# Patient Record
Sex: MALE | Race: WHITE | NOT HISPANIC OR LATINO | Employment: FULL TIME | URBAN - NONMETROPOLITAN AREA
[De-identification: names, ages, dates, MRNs, and addresses within clinical notes are randomized per-mention and may not be internally consistent; named-entity substitution may affect disease eponyms.]

---

## 2020-04-02 ENCOUNTER — HOSPITAL ENCOUNTER (EMERGENCY)
Facility: HOSPITAL | Age: 60
Discharge: HOME | End: 2020-04-02
Attending: EMERGENCY MEDICINE | Admitting: EMERGENCY MEDICINE

## 2020-04-02 VITALS
RESPIRATION RATE: 18 BRPM | WEIGHT: 182 LBS | OXYGEN SATURATION: 97 % | HEART RATE: 97 BPM | SYSTOLIC BLOOD PRESSURE: 173 MMHG | HEIGHT: 72 IN | BODY MASS INDEX: 24.65 KG/M2 | DIASTOLIC BLOOD PRESSURE: 103 MMHG | TEMPERATURE: 98.5 F

## 2020-04-02 DIAGNOSIS — M54.50 ACUTE LEFT-SIDED LOW BACK PAIN WITHOUT SCIATICA: Primary | ICD-10-CM

## 2020-04-02 PROCEDURE — 25010000002 MORPHINE PER 10 MG: Performed by: EMERGENCY MEDICINE

## 2020-04-02 PROCEDURE — 96372 THER/PROPH/DIAG INJ SC/IM: CPT

## 2020-04-02 PROCEDURE — 99283 EMERGENCY DEPT VISIT LOW MDM: CPT

## 2020-04-02 RX ORDER — MORPHINE SULFATE 4 MG/ML
4 INJECTION, SOLUTION INTRAMUSCULAR; INTRAVENOUS ONCE
Status: COMPLETED | OUTPATIENT
Start: 2020-04-02 | End: 2020-04-02

## 2020-04-02 RX ORDER — GABAPENTIN 600 MG/1
600 TABLET ORAL 4 TIMES DAILY
COMMUNITY
End: 2020-07-17 | Stop reason: SDUPTHER

## 2020-04-02 RX ORDER — ALBUTEROL SULFATE 90 UG/1
2 AEROSOL, METERED RESPIRATORY (INHALATION) EVERY 4 HOURS PRN
COMMUNITY
End: 2020-11-17 | Stop reason: SDUPTHER

## 2020-04-02 RX ORDER — ROPINIROLE 0.25 MG/1
0.25 TABLET, FILM COATED ORAL 3 TIMES DAILY
COMMUNITY
End: 2020-09-02 | Stop reason: SDUPTHER

## 2020-04-02 RX ORDER — CARVEDILOL 12.5 MG/1
12.5 TABLET ORAL 2 TIMES DAILY WITH MEALS
COMMUNITY
End: 2022-08-08 | Stop reason: SDUPTHER

## 2020-04-02 RX ORDER — TIZANIDINE 4 MG/1
4 TABLET ORAL 3 TIMES DAILY
COMMUNITY
End: 2020-10-26 | Stop reason: SDUPTHER

## 2020-04-02 RX ORDER — PANTOPRAZOLE SODIUM 40 MG/1
40 TABLET, DELAYED RELEASE ORAL DAILY
COMMUNITY
End: 2022-05-02 | Stop reason: SDUPTHER

## 2020-04-02 RX ORDER — CYCLOBENZAPRINE HCL 10 MG
10 TABLET ORAL 3 TIMES DAILY PRN
COMMUNITY
End: 2020-10-26

## 2020-04-02 RX ORDER — ATORVASTATIN CALCIUM 40 MG/1
40 TABLET, FILM COATED ORAL NIGHTLY
COMMUNITY
End: 2021-05-25 | Stop reason: SDUPTHER

## 2020-04-02 RX ORDER — LIDOCAINE 50 MG/G
1 PATCH TOPICAL EVERY 24 HOURS
Qty: 30 PATCH | Refills: 0 | Status: SHIPPED | OUTPATIENT
Start: 2020-04-02 | End: 2020-12-09

## 2020-04-02 RX ORDER — HYDROCODONE BITARTRATE AND ACETAMINOPHEN 5; 325 MG/1; MG/1
1 TABLET ORAL DAILY PRN
COMMUNITY
End: 2020-07-17 | Stop reason: SDUPTHER

## 2020-04-02 RX ADMIN — MORPHINE SULFATE 4 MG: 4 INJECTION, SOLUTION INTRAMUSCULAR; INTRAVENOUS at 15:39

## 2020-04-02 NOTE — ED PROVIDER NOTES
Subjective   This patient states for 1 week he has had left low back pain.  He states it occurred after he spent a day working on some cars he states he may have injured it getting under and out from under cars throughout the day although he does not recall a specific moment.  No radicular symptoms.  No bowel or bladder incontinence or saddle paresthesias.  No rash.  Pain is worse with range of motion and movement in the left low back.  No midline tenderness.  There is no scar noted over the lumbar spine midline from a past surgery in December last year.  He denies midline vertebral tenderness.  No urinary symptoms.  No flank pain or abdominal pain.  He was seen at an [facility] emergency department on Tuesday and given a shot of Toradol and muscle relaxer and sent home with a Medrol Dosepak and Flexeril.  He has hydrocodone listed on his medication list confirmed by his pharmacy that he takes 1 a day of for chronic back pain.  He comes into the ER today for continued pain and no improvement in symptoms.          Review of Systems   Constitutional: Negative.  Negative for fever.   HENT: Negative.    Eyes: Negative.    Respiratory: Negative.    Cardiovascular: Negative.    Gastrointestinal: Negative.  Negative for abdominal pain.   Genitourinary: Negative.  Negative for difficulty urinating, dysuria, flank pain and hematuria.   Musculoskeletal: Negative.    Skin: Negative.  Negative for rash.   Allergic/Immunologic: Negative.    Neurological: Negative.    Psychiatric/Behavioral: Negative.    All other systems reviewed and are negative.      Past Medical History:   Diagnosis Date   • Arthritis    • COPD (chronic obstructive pulmonary disease) (CMS/[facility])    • Hyperlipidemia    • Hypertension    • WPW (Kalyani-Parkinson-White syndrome)        No Known Allergies    Past Surgical History:   Procedure Laterality Date   • BACK SURGERY     • FACIAL RECONSTRUCTION SURGERY         History reviewed. No pertinent family  history.    Social History     Socioeconomic History   • Marital status:      Spouse name: Not on file   • Number of children: Not on file   • Years of education: Not on file   • Highest education level: Not on file   Tobacco Use   • Smoking status: Current Every Day Smoker     Packs/day: 0.50     Types: Cigarettes   Substance and Sexual Activity   • Alcohol use: Never     Frequency: Never   • Drug use: Never   • Sexual activity: Defer           Objective   Physical Exam   Constitutional: He is oriented to person, place, and time. He appears well-developed and well-nourished.   HENT:   Head: Normocephalic and atraumatic.   Right Ear: External ear normal.   Left Ear: External ear normal.   Neck: Normal range of motion.   Cardiovascular: Normal rate.   Pulmonary/Chest: Effort normal.   Abdominal: Soft. There is no tenderness.   Musculoskeletal: Normal range of motion.        Lumbar back: He exhibits tenderness. He exhibits no bony tenderness, no swelling, no edema, no deformity and no laceration.        Back:    Neurological: He is alert and oriented to person, place, and time. GCS eye subscore is 4. GCS verbal subscore is 5. GCS motor subscore is 6.   2+ patellar reflexes bilaterally.  5-5 strength in dorsi and plantar flexion against resistance of bilateral lower extremities.  Has a straight leg raise at the left and 90 degrees   Skin: Skin is warm and dry.   Psychiatric: He has a normal mood and affect. His behavior is normal. Thought content normal.   Nursing note and vitals reviewed.      Procedures           ED Course                                           MDM  No red flags consistent with cauda equina.  Pain is 100% reproducible with straight leg raise on the left, palpation of left low back and movement.  Suspect musculoskeletal pain.  Will give shot of morphine here and prescribe topical pain patches as the patient already has muscle relaxers, Lortab, steroids at home. Discussed with Dr. Allen, he  agrees with workup and plan. Noted mild hypertension, likely due to pain. No abd pain or TTP.  Final diagnoses:   Acute left-sided low back pain without sciatica            Siva Tabares PA-C  04/02/20 1551

## 2020-05-28 ENCOUNTER — OFFICE VISIT (OUTPATIENT)
Dept: FAMILY MEDICINE CLINIC | Facility: CLINIC | Age: 60
End: 2020-05-28

## 2020-05-28 VITALS
DIASTOLIC BLOOD PRESSURE: 70 MMHG | TEMPERATURE: 98.6 F | HEIGHT: 72 IN | HEART RATE: 75 BPM | SYSTOLIC BLOOD PRESSURE: 120 MMHG | WEIGHT: 180 LBS | BODY MASS INDEX: 24.38 KG/M2 | RESPIRATION RATE: 18 BRPM | OXYGEN SATURATION: 96 %

## 2020-05-28 DIAGNOSIS — Z71.6 ENCOUNTER FOR SMOKING CESSATION COUNSELING: ICD-10-CM

## 2020-05-28 DIAGNOSIS — E78.2 MIXED HYPERLIPIDEMIA: ICD-10-CM

## 2020-05-28 DIAGNOSIS — I10 ESSENTIAL HYPERTENSION: ICD-10-CM

## 2020-05-28 DIAGNOSIS — J44.9 CHRONIC OBSTRUCTIVE PULMONARY DISEASE, UNSPECIFIED COPD TYPE (HCC): Primary | ICD-10-CM

## 2020-05-28 DIAGNOSIS — R22.1 NECK FULLNESS: ICD-10-CM

## 2020-05-28 DIAGNOSIS — E01.0 THYROMEGALY: ICD-10-CM

## 2020-05-28 DIAGNOSIS — K21.9 GASTROESOPHAGEAL REFLUX DISEASE WITHOUT ESOPHAGITIS: ICD-10-CM

## 2020-05-28 DIAGNOSIS — F17.200 TOBACCO USE DISORDER: ICD-10-CM

## 2020-05-28 DIAGNOSIS — I45.6 WOLFF-PARKINSON-WHITE SYNDROME: ICD-10-CM

## 2020-05-28 DIAGNOSIS — Z82.49 FAMILY HISTORY OF PREMATURE CAD: ICD-10-CM

## 2020-05-28 PROCEDURE — 99204 OFFICE O/P NEW MOD 45 MIN: CPT | Performed by: FAMILY MEDICINE

## 2020-05-28 PROCEDURE — 99406 BEHAV CHNG SMOKING 3-10 MIN: CPT | Performed by: FAMILY MEDICINE

## 2020-05-28 RX ORDER — BECLOMETHASONE DIPROPIONATE HFA 40 UG/1
AEROSOL, METERED RESPIRATORY (INHALATION)
COMMUNITY
Start: 2020-05-09 | End: 2020-09-02

## 2020-05-28 RX ORDER — NALOXONE HYDROCHLORIDE 4 MG/.1ML
1 SPRAY NASAL AS NEEDED
COMMUNITY
Start: 2020-05-01

## 2020-05-28 NOTE — PROGRESS NOTES
"Subjective   Gaston Bagley is a 59 y.o. male.     History of Present Illness  Mr. Bagley presents today as a new pt to establish care. He was previously followed by Dr. Whittington at [facility]. Current consulting physicians include [facility] [place] (\"Dr. PIERCE\"), Dr. Wooten at [facility] in [place].    Chronic medical problems include COPD, he believes emphysema. Currently on Qvar and albuterol as needed. Using as rx'd \"most days\". He has chronic cough, wheeze, REYNA. No CP, hemoptysis, weight loss. He is a chronic smoker. He belives he is UTD on pneumococcal vaccination.    Began smoking age 17.  Smoked average of 1-2 ppd, currently down to 1.2 ppd.  Has over 60 pk/yr hx.  Current symptoms include cough, wheeze, REYNA.  Has tried to quit previously via \"going cold turkey\" and was successful for approx 1 month.  Feels smoking helps deal with stress.  Feels smoking cessation would benefit his health, save money.  Barriers to smoking cessation include spouse who smokes.    He is currenlty on PPI for heartburn/reflux. Denies h/o PUD. Heartburn currently well controlled. No dysphagia, abd pain, weight loss, blood in stool.    Followed by cardiology for dx of WPW. Denies h/o CAD, CHF, PAD. Denies palpitations, dizziness, syncope. Has strong family h/o CAD and CVA. On statin for HLP.    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 Followed by Dr. Bernadine Wooten. On chronic opioid analgesics.     He is , employed as automechanic.    Believes he is UTD on tetanus.    The following portions of the patient's history were reviewed and updated as appropriate: allergies, current medications, past family history, past medical history, past social history, past surgical history and problem list.    Review of Systems   Constitutional: Negative for appetite change, fatigue, fever and unexpected weight change.   HENT: Negative for congestion, ear pain, hearing loss, nosebleeds, rhinorrhea, sneezing, sore throat, tinnitus " and trouble swallowing.    Eyes: Negative for pain, discharge, redness and visual disturbance.   Respiratory: Positive for cough, shortness of breath and wheezing. Negative for chest tightness.    Cardiovascular: Negative for chest pain, palpitations and leg swelling.   Gastrointestinal: Negative for abdominal pain, blood in stool, constipation, diarrhea, nausea and vomiting.   Endocrine: Negative for cold intolerance, heat intolerance, polydipsia and polyuria.   Genitourinary: Negative for dysuria, flank pain, frequency, hematuria and urgency.   Musculoskeletal: Positive for arthralgias. Negative for back pain, joint swelling, myalgias and neck pain.   Skin: Negative for rash and wound.   Neurological: Negative for dizziness, tremors, seizures, syncope, speech difficulty, weakness, numbness and headaches.   Hematological: Negative for adenopathy. Does not bruise/bleed easily.   Psychiatric/Behavioral: Negative for confusion, dysphoric mood, sleep disturbance and suicidal ideas. The patient is not nervous/anxious.        Objective    Vitals:    05/28/20 0943   BP: 120/70   Pulse: 75   Resp: 18   Temp: 98.6 °F (37 °C)   SpO2: 96%     Body mass index is 24.41 kg/m².      05/28/20  0943   Weight: 81.6 kg (180 lb)       Physical Exam   Constitutional: He is oriented to person, place, and time. He appears well-developed and well-nourished. He is cooperative. He does not appear ill. No distress.   Appears older than age   HENT:   Head: Normocephalic and atraumatic.   Mouth/Throat: Mucous membranes are not dry. He has dentures. Oral lesions (mild thrush) present. Posterior oropharyngeal erythema present.   Eyes: Conjunctivae and lids are normal. No scleral icterus.   Neck: Neck supple. Normal carotid pulses present. Carotid bruit is not present. Thyromegaly (right side) present.   Hoarseness noted   Cardiovascular: Normal rate, regular rhythm, S1 normal, S2 normal and intact distal pulses. Exam reveals no gallop.   No  "murmur heard.  Pulmonary/Chest: Effort normal. No respiratory distress. He has decreased breath sounds. He has wheezes (scattered coarse). He has rhonchi (scattered).   Abdominal: Soft. Bowel sounds are normal. He exhibits no distension and no mass. There is no hepatosplenomegaly. There is no tenderness.   Musculoskeletal: He exhibits no edema, tenderness or deformity.     Vascular Status -  His right foot exhibits no edema. His left foot exhibits no edema.  Lymphadenopathy:     He has no cervical adenopathy.        Right: No supraclavicular adenopathy present.        Left: No supraclavicular adenopathy present.   Neurological: He is alert and oriented to person, place, and time. He has normal strength. He displays no tremor. No cranial nerve deficit or sensory deficit. Gait normal.   Skin: Skin is warm and dry. No ecchymosis and no rash noted. No cyanosis.   Psychiatric: He has a normal mood and affect. His speech is normal and behavior is normal. Judgment and thought content normal. Cognition and memory are normal.   Good eye contact. Answers questions appropriately. Good personal hygiene and grooming.   Nursing note and vitals reviewed.      Assessment/Plan   Gaston was seen today for establish care.    Diagnoses and all orders for this visit:    Chronic obstructive pulmonary disease, unspecified COPD type (CMS/HCC)    Gastroesophageal reflux disease without esophagitis    Kalyani-Parkinson-White syndrome    Essential hypertension    Family history of premature CAD    Neck fullness  -     US Thyroid; Future    Thyromegaly  -     US Thyroid; Future    Mixed hyperlipidemia      Tobacco cessation advised.  Pt voiced understanding of health risks of cont'd tobacco use. Risks/benefits and potential side effects of various smoking cessation treatment options reviewed with patient.  Smoking cessation support options also reviewed with patient.  Patient voiced understanding and wishes to proceed with continuing \"weaning " "down\". Next goal of being down to 6 cigs by next visit in 3 months.  A total of 5 minutes dedicated to smoking cessation counseling during this visit.    COPD stable. Continue Qvar and albuterol as needed. Thrush prevention methods reviewed.    Continue statin for mgnt of HLP. Continue coreg for HTN. Pt advised to eat a heart healthy diet and get regular aerobic exercise.    F/u with cardology as scheduled for mgnt/monitoring of WPW. Records to be reviewed in order to clarify any comorbid diagnoses.    GERD controlled on PPI. Preventive measures reviewed.     Routine f/u in 3 months, sooner as needed/instructed.  I will contact patient regarding test results and provide instructions regarding any necessary changes in plan of care.  Records requested from previous primary provider as well as any consulting physician, admitting hospitals, etc. Further recommendations pending review.  Patient was encouraged to keep me informed of any acute changes, or any new concerning symptoms.  Pt is aware of reasons to seek emergent care.  Patient voiced understanding of all instructions and denied further questions.                 "

## 2020-05-29 PROBLEM — K21.9 GASTROESOPHAGEAL REFLUX DISEASE WITHOUT ESOPHAGITIS: Status: ACTIVE | Noted: 2020-05-29

## 2020-05-29 PROBLEM — E78.2 MIXED HYPERLIPIDEMIA: Status: ACTIVE | Noted: 2020-05-29

## 2020-05-29 PROBLEM — I45.6 WOLFF-PARKINSON-WHITE SYNDROME: Status: ACTIVE | Noted: 2020-05-29

## 2020-05-29 PROBLEM — Z82.49 FAMILY HISTORY OF PREMATURE CAD: Status: ACTIVE | Noted: 2020-05-29

## 2020-05-29 PROBLEM — J44.9 CHRONIC OBSTRUCTIVE PULMONARY DISEASE (HCC): Status: ACTIVE | Noted: 2020-05-29

## 2020-05-29 PROBLEM — I10 ESSENTIAL HYPERTENSION: Status: ACTIVE | Noted: 2020-05-29

## 2020-06-03 ENCOUNTER — HOSPITAL ENCOUNTER (OUTPATIENT)
Dept: ULTRASOUND IMAGING | Facility: HOSPITAL | Age: 60
Discharge: HOME | End: 2020-06-03
Admitting: FAMILY MEDICINE

## 2020-06-03 DIAGNOSIS — E01.0 THYROMEGALY: ICD-10-CM

## 2020-06-03 DIAGNOSIS — R22.1 NECK FULLNESS: ICD-10-CM

## 2020-06-03 PROCEDURE — 76536 US EXAM OF HEAD AND NECK: CPT

## 2020-06-04 ENCOUNTER — TELEPHONE (OUTPATIENT)
Dept: FAMILY MEDICINE CLINIC | Facility: CLINIC | Age: 60
End: 2020-06-04

## 2020-06-04 PROBLEM — E04.2 MULTIPLE THYROID NODULES: Status: ACTIVE | Noted: 2020-06-04

## 2020-06-04 NOTE — TELEPHONE ENCOUNTER
----- Message from Katalina Elam MD sent at 6/4/2020  3:04 PM EDT -----  Please inform pt his thyroid US confirmed presence of thyroid nodules which are most likely not a problem. He will need a f/u US in 6 months to make sure they are stable.

## 2020-06-04 NOTE — TELEPHONE ENCOUNTER
Attempted to call pt for Thyroid US results.  There was no answer, I left a VM to call back for results.

## 2020-06-04 NOTE — PROGRESS NOTES
Please inform pt his thyroid US confirmed presence of thyroid nodules which are most likely not a problem. He will need a f/u US in 6 months to make sure they are stable.

## 2020-06-04 NOTE — TELEPHONE ENCOUNTER
I spoke advised patient of results. He states verbal understanding and will call back to schedule a 6 month f/u.

## 2020-06-17 PROBLEM — I47.1 PAROXYSMAL SVT (SUPRAVENTRICULAR TACHYCARDIA): Status: ACTIVE | Noted: 2020-06-17

## 2020-06-17 PROBLEM — E53.8 VITAMIN B 12 DEFICIENCY: Status: ACTIVE | Noted: 2020-06-17

## 2020-06-17 PROBLEM — M51.369 DDD (DEGENERATIVE DISC DISEASE), LUMBAR: Status: ACTIVE | Noted: 2020-06-17

## 2020-06-17 PROBLEM — I47.10 PAROXYSMAL SVT (SUPRAVENTRICULAR TACHYCARDIA): Status: ACTIVE | Noted: 2020-06-17

## 2020-06-17 PROBLEM — I07.1 MODERATE TRICUSPID REGURGITATION: Status: ACTIVE | Noted: 2020-06-17

## 2020-06-17 PROBLEM — M51.36 DDD (DEGENERATIVE DISC DISEASE), LUMBAR: Status: ACTIVE | Noted: 2020-06-17

## 2020-06-17 PROBLEM — I27.20 PULMONARY HYPERTENSION: Status: ACTIVE | Noted: 2020-06-17

## 2020-07-15 ENCOUNTER — OFFICE VISIT (OUTPATIENT)
Dept: FAMILY MEDICINE CLINIC | Facility: CLINIC | Age: 60
End: 2020-07-15

## 2020-07-15 VITALS
TEMPERATURE: 98 F | WEIGHT: 176 LBS | HEIGHT: 72 IN | DIASTOLIC BLOOD PRESSURE: 82 MMHG | HEART RATE: 81 BPM | SYSTOLIC BLOOD PRESSURE: 120 MMHG | RESPIRATION RATE: 14 BRPM | BODY MASS INDEX: 23.84 KG/M2 | OXYGEN SATURATION: 95 %

## 2020-07-15 DIAGNOSIS — G89.29 CHRONIC BILATERAL LOW BACK PAIN WITHOUT SCIATICA: Primary | ICD-10-CM

## 2020-07-15 DIAGNOSIS — M54.50 CHRONIC BILATERAL LOW BACK PAIN WITHOUT SCIATICA: Primary | ICD-10-CM

## 2020-07-15 DIAGNOSIS — M51.36 DDD (DEGENERATIVE DISC DISEASE), LUMBAR: ICD-10-CM

## 2020-07-15 PROCEDURE — 99213 OFFICE O/P EST LOW 20 MIN: CPT | Performed by: FAMILY MEDICINE

## 2020-07-15 NOTE — PROGRESS NOTES
"Subjective   Gaston Bagley is a 59 y.o. male.     History of Present Illness   Mr. Bgaley presents today with c/o worsening back pain.    He has known L-DDD/DJD. Most recently seen by [facility] pain nt. Recently discharged as he did not show for pill count (was seen for regular apt but did not bring med bottles, did not return with bottles for pill counting).  Would like referral closer to home. Pain worse approx 2 weeks ago. No known injury no fall. Having to take off work early due to pain. No new focal neuro symptoms. Currently on gabapentin, muscle relaxant and hydrocodone via pain mgnt. No imaging in past year. TENS unit does help some. Worse with frequent bending, lifting, twisting. Had \"shot\" few years ago. Did not feel they helped for very long.    L4-L5 fusion per Dr. STACIA Moore 2018.    Sees Dr. Watkins for injections in thumbs due to OA.    The following portions of the patient's history were reviewed and updated as appropriate: allergies, current medications, past family history, past medical history, past social history, past surgical history and problem list.    Review of Systems   Constitutional: Negative for appetite change, fatigue, fever and unexpected weight change.   HENT: Negative for congestion, ear pain, hearing loss, nosebleeds, rhinorrhea, sneezing, sore throat, tinnitus and trouble swallowing.    Eyes: Negative for pain, discharge, redness and visual disturbance.   Respiratory: Positive for cough, shortness of breath and wheezing. Negative for chest tightness.    Cardiovascular: Negative for chest pain, palpitations and leg swelling.   Gastrointestinal: Negative for abdominal pain, blood in stool, constipation, diarrhea, nausea and vomiting.   Endocrine: Negative for cold intolerance, heat intolerance, polydipsia and polyuria.   Genitourinary: Negative for dysuria, flank pain, frequency, hematuria and urgency.   Musculoskeletal: Positive for arthralgias and back pain. Negative for joint " swelling, myalgias and neck pain.   Skin: Negative for rash and wound.   Neurological: Negative for dizziness, tremors, seizures, syncope, speech difficulty, weakness, numbness and headaches.   Hematological: Negative for adenopathy. Does not bruise/bleed easily.   Psychiatric/Behavioral: Negative for confusion, dysphoric mood, sleep disturbance and suicidal ideas. The patient is not nervous/anxious.    Pt's previous ROS reviewed and updated as indicated.       Objective    Vitals:    07/15/20 1048   BP: 120/82   Pulse: 81   Resp: 14   Temp: 98 °F (36.7 °C)   SpO2: 95%     Body mass index is 23.87 kg/m².      07/15/20  1048   Weight: 79.8 kg (176 lb)       Physical Exam   Constitutional: He is oriented to person, place, and time. He appears well-developed and well-nourished. He is cooperative. He does not appear ill. No distress.   Appears older than age   HENT:   Head: Normocephalic and atraumatic.   Neck:   Hoarseness noted   Cardiovascular: Normal rate, regular rhythm, S1 normal, S2 normal and intact distal pulses. Exam reveals no gallop.   No murmur heard.  Pulmonary/Chest: Effort normal. No respiratory distress. He has decreased breath sounds. He has wheezes (scattered coarse).   Musculoskeletal: He exhibits no edema.        Lumbar back: He exhibits decreased range of motion (decreased flexion due ot pain, pain with rotation to right), tenderness (diffuse soft tissue), bony tenderness (L3-S1), deformity (loss of normal lordosis) and spasm.     Vascular Status -  His right foot exhibits no edema. His left foot exhibits no edema.  Neurological: He is alert and oriented to person, place, and time. He has normal strength and normal reflexes. He displays no tremor. No sensory deficit. Gait (mildly antalgic) abnormal.   Mildly + SLR bilaterally at approx 45 deg   Skin: Skin is warm and dry. No ecchymosis and no rash noted.   Psychiatric: He has a normal mood and affect. His behavior is normal. Cognition and memory  are normal.   Nursing note and vitals reviewed.  Pt's previous physical exam reviewed and updated as indicated.        Assessment/Plan   Gaston was seen today for back pain.    Diagnoses and all orders for this visit:    Chronic bilateral low back pain without sciatica  -     XR Spine Lumbar 2 or 3 View; Future  -     Ambulatory Referral to Pain Management    DDD (degenerative disc disease), lumbar  -     Ambulatory Referral to Pain Management       Continue current tx plan. No refills needed today  Refer to pain mgnt as above.  F/u as scheduled next month, f/u sooner as needed.  Patient was encouraged to keep me informed of any acute changes, lack of improvement, or any new concerning symptoms.  Pt is aware of reasons to seek emergent care.  Patient voiced understanding of all instructions and denied further questions.

## 2020-07-17 DIAGNOSIS — M51.36 DDD (DEGENERATIVE DISC DISEASE), LUMBAR: Primary | ICD-10-CM

## 2020-07-17 NOTE — TELEPHONE ENCOUNTER
Patient's wife Felicity requested a refill of gabapentin (NEURONTIN) 600 MG tablet and HYDROcodone-acetaminophen (NORCO) 5-325 MG per tablet.    Please call and advise. Felicity's call back [phone]    [facility] - [place], [place] - [address] [place] [address], [address] - [phone]  - [phone] FX

## 2020-07-20 RX ORDER — HYDROCODONE BITARTRATE AND ACETAMINOPHEN 5; 325 MG/1; MG/1
1 TABLET ORAL 2 TIMES DAILY PRN
Qty: 60 TABLET | Refills: 0 | Status: SHIPPED | OUTPATIENT
Start: 2020-07-20 | End: 2021-02-25

## 2020-07-20 RX ORDER — GABAPENTIN 600 MG/1
600 TABLET ORAL 3 TIMES DAILY
Qty: 90 TABLET | Refills: 0 | Status: SHIPPED | OUTPATIENT
Start: 2020-07-20 | End: 2021-02-25

## 2020-07-20 NOTE — TELEPHONE ENCOUNTER
He is transitioning to a new pain mgnt clinic. Temporary rx's printed for . He will need UDS and temp med agreement

## 2020-07-21 ENCOUNTER — RESULTS ENCOUNTER (OUTPATIENT)
Dept: FAMILY MEDICINE CLINIC | Facility: CLINIC | Age: 60
End: 2020-07-21

## 2020-07-21 ENCOUNTER — CLINICAL SUPPORT (OUTPATIENT)
Dept: FAMILY MEDICINE CLINIC | Facility: CLINIC | Age: 60
End: 2020-07-21

## 2020-07-21 DIAGNOSIS — Z79.899 MEDICATION MANAGEMENT: ICD-10-CM

## 2020-07-21 DIAGNOSIS — Z79.899 MEDICATION MANAGEMENT: Primary | ICD-10-CM

## 2020-07-23 PROBLEM — E55.9 VITAMIN D DEFICIENCY: Status: ACTIVE | Noted: 2020-07-23

## 2020-07-23 PROBLEM — E29.1 HYPOGONADISM IN MALE: Status: ACTIVE | Noted: 2020-07-23

## 2020-08-12 ENCOUNTER — TELEPHONE (OUTPATIENT)
Dept: FAMILY MEDICINE CLINIC | Facility: CLINIC | Age: 60
End: 2020-08-12

## 2020-08-12 NOTE — TELEPHONE ENCOUNTER
CARLOS, WIFE, CALLING IN TO LET DR YBARRA KNOW THEY WOULD LIKE TO GET THE XRAY DONE IN [place]. SHE TOLD THEM TO LET HER KNOW AND SHE WOULD SEND THE ORDER THERE FOR THEM.    PATIENT IS ON HIS WAY TO [place] NOW. CAN THEY BE SENT NOW?    PLEASE CALL AND ADVISE -[phone]

## 2020-08-18 ENCOUNTER — TELEPHONE (OUTPATIENT)
Dept: FAMILY MEDICINE CLINIC | Facility: CLINIC | Age: 60
End: 2020-08-18

## 2020-08-31 ENCOUNTER — RESULTS ENCOUNTER (OUTPATIENT)
Dept: FAMILY MEDICINE CLINIC | Facility: CLINIC | Age: 60
End: 2020-08-31

## 2020-08-31 ENCOUNTER — OFFICE VISIT (OUTPATIENT)
Dept: FAMILY MEDICINE CLINIC | Facility: CLINIC | Age: 60
End: 2020-08-31

## 2020-08-31 DIAGNOSIS — E55.9 VITAMIN D DEFICIENCY: ICD-10-CM

## 2020-08-31 DIAGNOSIS — R53.82 CHRONIC FATIGUE: ICD-10-CM

## 2020-08-31 DIAGNOSIS — Z87.891 PERSONAL HISTORY OF TOBACCO USE: ICD-10-CM

## 2020-08-31 DIAGNOSIS — E78.2 MIXED HYPERLIPIDEMIA: ICD-10-CM

## 2020-08-31 DIAGNOSIS — E53.8 VITAMIN B 12 DEFICIENCY: ICD-10-CM

## 2020-08-31 DIAGNOSIS — F17.200 ENCOUNTER FOR SCREENING FOR MALIGNANT NEOPLASM OF LUNG IN CURRENT SMOKER WITH 30 PACK YEAR HISTORY OR GREATER: ICD-10-CM

## 2020-08-31 DIAGNOSIS — Z23 NEED FOR 23-POLYVALENT PNEUMOCOCCAL POLYSACCHARIDE VACCINE: ICD-10-CM

## 2020-08-31 DIAGNOSIS — M25.50 POLYARTHRALGIA: ICD-10-CM

## 2020-08-31 DIAGNOSIS — E04.2 MULTIPLE THYROID NODULES: ICD-10-CM

## 2020-08-31 DIAGNOSIS — Z13.1 SCREENING FOR DIABETES MELLITUS: ICD-10-CM

## 2020-08-31 DIAGNOSIS — Z12.11 SCREENING FOR COLON CANCER: ICD-10-CM

## 2020-08-31 DIAGNOSIS — J44.9 CHRONIC OBSTRUCTIVE PULMONARY DISEASE, UNSPECIFIED COPD TYPE (HCC): Primary | ICD-10-CM

## 2020-08-31 DIAGNOSIS — Z12.2 ENCOUNTER FOR SCREENING FOR MALIGNANT NEOPLASM OF LUNG IN CURRENT SMOKER WITH 30 PACK YEAR HISTORY OR GREATER: ICD-10-CM

## 2020-08-31 DIAGNOSIS — I27.20 PULMONARY HYPERTENSION (HCC): ICD-10-CM

## 2020-08-31 DIAGNOSIS — I10 ESSENTIAL HYPERTENSION: ICD-10-CM

## 2020-08-31 PROCEDURE — 99214 OFFICE O/P EST MOD 30 MIN: CPT | Performed by: FAMILY MEDICINE

## 2020-08-31 RX ORDER — DICLOFENAC SODIUM 75 MG/1
TABLET, DELAYED RELEASE ORAL
COMMUNITY
Start: 2020-08-12 | End: 2020-09-02 | Stop reason: SDUPTHER

## 2020-09-01 LAB
25(OH)D3+25(OH)D2 SERPL-MCNC: 26.7 NG/ML (ref 30–100)
ALBUMIN SERPL-MCNC: 4.6 G/DL (ref 3.5–5.2)
ALBUMIN/GLOB SERPL: 2.6 G/DL
ALP SERPL-CCNC: 121 U/L (ref 39–117)
ALT SERPL-CCNC: 12 U/L (ref 1–41)
ANA SER QL: POSITIVE
AST SERPL-CCNC: 14 U/L (ref 1–40)
BASOPHILS # BLD AUTO: 0.05 10*3/MM3 (ref 0–0.2)
BASOPHILS NFR BLD AUTO: 0.8 % (ref 0–1.5)
BILIRUB SERPL-MCNC: 0.7 MG/DL (ref 0–1.2)
BUN SERPL-MCNC: 21 MG/DL (ref 6–20)
BUN/CREAT SERPL: 24.1 (ref 7–25)
CALCIUM SERPL-MCNC: 8.9 MG/DL (ref 8.6–10.5)
CENTROMERE B AB SER-ACNC: <0.2 AI (ref 0–0.9)
CHLORIDE SERPL-SCNC: 102 MMOL/L (ref 98–107)
CHOLEST SERPL-MCNC: 97 MG/DL (ref 0–200)
CHROMATIN AB SERPL-ACNC: <0.2 AI (ref 0–0.9)
CO2 SERPL-SCNC: 28.7 MMOL/L (ref 22–29)
CREAT SERPL-MCNC: 0.87 MG/DL (ref 0.76–1.27)
DSDNA AB SER-ACNC: <1 IU/ML (ref 0–9)
ENA JO1 AB SER-ACNC: <0.2 AI (ref 0–0.9)
ENA RNP AB SER-ACNC: 0.3 AI (ref 0–0.9)
ENA SCL70 AB SER-ACNC: 1 AI (ref 0–0.9)
ENA SM AB SER-ACNC: <0.2 AI (ref 0–0.9)
ENA SS-A AB SER-ACNC: <0.2 AI (ref 0–0.9)
ENA SS-B AB SER-ACNC: <0.2 AI (ref 0–0.9)
EOSINOPHIL # BLD AUTO: 0.28 10*3/MM3 (ref 0–0.4)
EOSINOPHIL NFR BLD AUTO: 4.7 % (ref 0.3–6.2)
ERYTHROCYTE [DISTWIDTH] IN BLOOD BY AUTOMATED COUNT: 12 % (ref 12.3–15.4)
GLOBULIN SER CALC-MCNC: 1.8 GM/DL
GLUCOSE SERPL-MCNC: 108 MG/DL (ref 65–99)
HBA1C MFR BLD: 5.6 % (ref 4.8–5.6)
HCT VFR BLD AUTO: 46.9 % (ref 37.5–51)
HDLC SERPL-MCNC: 38 MG/DL (ref 40–60)
HGB BLD-MCNC: 15.5 G/DL (ref 13–17.7)
IMM GRANULOCYTES # BLD AUTO: 0.02 10*3/MM3 (ref 0–0.05)
IMM GRANULOCYTES NFR BLD AUTO: 0.3 % (ref 0–0.5)
LDLC SERPL CALC-MCNC: 48 MG/DL (ref 0–100)
LYMPHOCYTES # BLD AUTO: 1.75 10*3/MM3 (ref 0.7–3.1)
LYMPHOCYTES NFR BLD AUTO: 29.3 % (ref 19.6–45.3)
Lab: ABNORMAL
MCH RBC QN AUTO: 30.1 PG (ref 26.6–33)
MCHC RBC AUTO-ENTMCNC: 33 G/DL (ref 31.5–35.7)
MCV RBC AUTO: 91.1 FL (ref 79–97)
MONOCYTES # BLD AUTO: 0.41 10*3/MM3 (ref 0.1–0.9)
MONOCYTES NFR BLD AUTO: 6.9 % (ref 5–12)
NEUTROPHILS # BLD AUTO: 3.47 10*3/MM3 (ref 1.7–7)
NEUTROPHILS NFR BLD AUTO: 58 % (ref 42.7–76)
NRBC BLD AUTO-RTO: 0 /100 WBC (ref 0–0.2)
PLATELET # BLD AUTO: 199 10*3/MM3 (ref 140–450)
POTASSIUM SERPL-SCNC: 4.3 MMOL/L (ref 3.5–5.2)
PROT SERPL-MCNC: 6.4 G/DL (ref 6–8.5)
RBC # BLD AUTO: 5.15 10*6/MM3 (ref 4.14–5.8)
SODIUM SERPL-SCNC: 139 MMOL/L (ref 136–145)
TRIGL SERPL-MCNC: 57 MG/DL (ref 0–150)
TSH SERPL DL<=0.005 MIU/L-ACNC: 1.26 UIU/ML (ref 0.27–4.2)
VIT B12 SERPL-MCNC: 317 PG/ML (ref 211–946)
VLDLC SERPL CALC-MCNC: 11.4 MG/DL
WBC # BLD AUTO: 5.98 10*3/MM3 (ref 3.4–10.8)

## 2020-09-02 ENCOUNTER — PRIOR AUTHORIZATION (OUTPATIENT)
Dept: FAMILY MEDICINE CLINIC | Facility: CLINIC | Age: 60
End: 2020-09-02

## 2020-09-02 RX ORDER — DICLOFENAC SODIUM 75 MG/1
75 TABLET, DELAYED RELEASE ORAL 2 TIMES DAILY
Qty: 60 TABLET | Refills: 5 | Status: SHIPPED | OUTPATIENT
Start: 2020-09-02 | End: 2021-02-24

## 2020-09-02 RX ORDER — ROPINIROLE 0.25 MG/1
0.25 TABLET, FILM COATED ORAL 3 TIMES DAILY
Qty: 90 TABLET | Refills: 5 | Status: SHIPPED | OUTPATIENT
Start: 2020-09-02 | End: 2021-02-24 | Stop reason: SDUPTHER

## 2020-09-03 VITALS
HEART RATE: 75 BPM | HEIGHT: 72 IN | BODY MASS INDEX: 23.43 KG/M2 | DIASTOLIC BLOOD PRESSURE: 80 MMHG | SYSTOLIC BLOOD PRESSURE: 130 MMHG | OXYGEN SATURATION: 98 % | WEIGHT: 173 LBS

## 2020-09-03 PROBLEM — G25.81 RLS (RESTLESS LEGS SYNDROME): Status: ACTIVE | Noted: 2020-09-03

## 2020-09-04 NOTE — TELEPHONE ENCOUNTER
PA denied, may cover with qty limits: Combivent Respimat, Incruse Ellipta, Fluticasone Salmeterol, Stiolto Respimat, Wixela Discus.

## 2020-09-07 NOTE — PROGRESS NOTES
Please inform pt his recent labs eval was normal other than:  1) low vitamin D- he should be getting 2000 units of vitamin D over the counter daily  2) + SLY which is a marker of possible autoimmune disease; this test is often falsely positive; I recommend we recheck in 6 months.

## 2020-09-09 NOTE — TELEPHONE ENCOUNTER
Spoke with patient states they did not change up any of his daily inhalers. Also wanted to let you know he has appointment with Cardiology next Wednesday.

## 2020-09-28 ENCOUNTER — TELEPHONE (OUTPATIENT)
Dept: FAMILY MEDICINE CLINIC | Facility: CLINIC | Age: 60
End: 2020-09-28

## 2020-09-28 DIAGNOSIS — R19.5 POSITIVE COLORECTAL CANCER SCREENING USING COLOGUARD TEST: Primary | ICD-10-CM

## 2020-09-28 NOTE — TELEPHONE ENCOUNTER
Please inform pt his recent ColoGuard stool test was positive. Therefore, he will need to undergo colonoscopy. Referral placed.

## 2020-10-06 NOTE — PROGRESS NOTES
Patient: Gaston Bagley    YOB: 1960    Date: 10/08/2020    Primary Care Provider: Katalina Elam MD    Chief Complaint   Patient presents with   • Establish Care   • Black or Bloody Stool     positive cologaurd        SUBJECTIVE:    History of present illness:  I saw the patient in the office today as a consultation for evaluation and treatment of blood in stool. Had a cologuard test and it was positive.     He is never had a previous colonoscopy.    The following portions of the patient's history were reviewed and updated as appropriate: allergies, current medications, past family history, past medical history, past social history, past surgical history and problem list.    Review of Systems   Constitutional: Negative for chills, fever and unexpected weight change.   HENT: Negative for trouble swallowing and voice change.    Eyes: Negative for visual disturbance.   Respiratory: Negative for apnea, cough, chest tightness, shortness of breath and wheezing.    Cardiovascular: Negative for chest pain, palpitations and leg swelling.   Gastrointestinal: Negative for abdominal distention, abdominal pain, anal bleeding, blood in stool, constipation, diarrhea, nausea, rectal pain and vomiting.   Endocrine: Negative for cold intolerance and heat intolerance.   Genitourinary: Negative for difficulty urinating, dysuria, flank pain, scrotal swelling and testicular pain.   Musculoskeletal: Negative for back pain, gait problem and joint swelling.   Skin: Negative for color change, rash and wound.   Neurological: Negative for dizziness, syncope, speech difficulty, weakness, numbness and headaches.   Hematological: Negative for adenopathy. Does not bruise/bleed easily.   Psychiatric/Behavioral: Negative for confusion. The patient is not nervous/anxious.        History:  Past Medical History:   Diagnosis Date   • Arthritis    • COPD (chronic obstructive pulmonary disease) (CMS/[facility])    • Hyperlipidemia    •  Hypertension    • Right cornea abrasion    • WPW (Kalyani-Parkinson-White syndrome)        Past Surgical History:   Procedure Laterality Date   • BACK SURGERY  2018    L4-L5 lumbar fusion Dr. Carlos Enrique Moore   • CARDIAC CATHETERIZATION  01/28/2020   • FACIAL RECONSTRUCTION SURGERY         Family History   Problem Relation Age of Onset   • Heart attack Mother 62   • Stroke Mother 75   • Arthritis Father    • Arthritis Brother    • Heart attack Brother 58   • Hypertension Brother    • Arthritis Brother    • Arthritis Brother    • Hypertension Brother    • Throat cancer Brother    • Arthritis Brother    • Hypertension Brother    • Arthritis Brother    • Stroke Brother 45       Social History     Tobacco Use   • Smoking status: Current Every Day Smoker     Packs/day: 1.00     Years: 43.00     Pack years: 43.00     Types: Cigarettes     Start date: 1977   • Smokeless tobacco: Never Used   • Tobacco comment: cutting back   Substance Use Topics   • Alcohol use: Never     Frequency: Never   • Drug use: Never       Allergies:  No Known Allergies    Medications:    Current Outpatient Medications:   •  albuterol sulfate  (90 Base) MCG/ACT inhaler, Inhale 2 puffs Every 4 (Four) Hours As Needed for Wheezing., Disp: , Rfl:   •  atorvastatin (LIPITOR) 40 MG tablet, Take 40 mg by mouth Every Night., Disp: , Rfl:   •  carvedilol (COREG) 12.5 MG tablet, Take 12.5 mg by mouth 2 (Two) Times a Day With Meals., Disp: , Rfl:   •  cyclobenzaprine (FLEXERIL) 10 MG tablet, Take 10 mg by mouth 3 (Three) Times a Day As Needed for Muscle Spasms., Disp: , Rfl:   •  diclofenac (VOLTAREN) 1 % gel gel, , Disp: , Rfl:   •  diclofenac (VOLTAREN) 75 MG EC tablet, Take 1 tablet by mouth 2 (Two) Times a Day., Disp: 60 tablet, Rfl: 5  •  fluticasone-salmeterol (ADVAIR) 250-50 MCG/DOSE DISKUS, Inhale 1 puff 2 (Two) Times a Day., Disp: 60 each, Rfl: 5  •  gabapentin (NEURONTIN) 600 MG tablet, Take 1 tablet by mouth 3 (Three) Times a Day., Disp: 90 tablet,  "Rfl: 0  •  HYDROcodone-acetaminophen (NORCO) 5-325 MG per tablet, Take 1 tablet by mouth 2 (Two) Times a Day As Needed for Severe Pain ., Disp: 60 tablet, Rfl: 0  •  lidocaine (Lidoderm) 5 %, Place 1 patch on the skin as directed by provider Daily. Remove & Discard patch within 12 hours or as directed by MD, Disp: 30 patch, Rfl: 0  •  NARCAN 4 MG/0.1ML nasal spray, USE 1 SPRAY IN ONE NOSTRIL FOR 1 DOSE AS NEEDED FOR OVERSEDATION, Disp: , Rfl:   •  pantoprazole (PROTONIX) 40 MG EC tablet, Take 40 mg by mouth Daily., Disp: , Rfl:   •  rOPINIRole (REQUIP) 0.25 MG tablet, Take 1 tablet by mouth 3 (Three) Times a Day. Take 1 hour before bedtime., Disp: 90 tablet, Rfl: 5  •  tiZANidine (ZANAFLEX) 4 MG tablet, Take 4 mg by mouth 3 (Three) Times a Day., Disp: , Rfl:   •  Umeclidinium Bromide (Incruse Ellipta) 62.5 MCG/INH aerosol powder , Inhale 1 puff Daily., Disp: 30 each, Rfl: 5    OBJECTIVE:    Vital Signs:   Vitals:    10/08/20 1420   BP: 130/80   Pulse: 94   SpO2: 95%   Weight: 86.2 kg (190 lb)   Height: 182.9 cm (72\")       Physical Exam:   General Appearance:    Alert, cooperative, in no acute distress   Head:    Normocephalic, without obvious abnormality, atraumatic   Eyes:            Lids and lashes normal, conjunctivae and sclerae normal, no   icterus, no pallor, corneas clear, PERRL   Ears:    Ears appear intact with no abnormalities noted   Throat:   No oral lesions, no thrush, oral mucosa moist   Neck:   No adenopathy, supple, trachea midline, no thyromegaly,  no JVD   Lungs:     Clear to auscultation,respirations regular, even and                  unlabored    Heart:    Regular rhythm and normal rate, normal S1 and S2, no            murmur   Abdomen:     no masses, no organomegaly, soft non-tender, non-distended, no guarding, there is no evidence of tenderness, no peritoneal signs   Extremities:   Moves all extremities well, no edema, no cyanosis, no             redness   Pulses:   Pulses palpable and equal " bilaterally   Skin:   No bleeding, bruising or rash   Lymph nodes:   No palpable adenopathy   Neurologic:   Cranial nerves 2 - 12 grossly intact, sensation intact      Results Review:   I reviewed the patient's new clinical results.  I reviewed the patient's new imaging results and agree with the interpretation.  I reviewed the patient's other test results and agree with the interpretation    Review of Systems was reviewed and confirmed as accurate as documented by the MA.    ASSESSMENT/PLAN:    1. Screening for colon cancer    2. Heme positive stool        I recommend a colonoscopy for further evaluation. The procedure was explained as well as the risks which include but are not limited to bleeding, infection, perforation, abdominal pain etc. The patient understands these risks and the procedure and wishes to proceed.     Electronically signed by Palomo Concepcion MD  10/08/20 18:12 EDT

## 2020-10-08 ENCOUNTER — OFFICE VISIT (OUTPATIENT)
Dept: SURGERY | Facility: CLINIC | Age: 60
End: 2020-10-08

## 2020-10-08 VITALS
DIASTOLIC BLOOD PRESSURE: 80 MMHG | HEART RATE: 94 BPM | HEIGHT: 72 IN | BODY MASS INDEX: 25.73 KG/M2 | OXYGEN SATURATION: 95 % | SYSTOLIC BLOOD PRESSURE: 130 MMHG | WEIGHT: 190 LBS

## 2020-10-08 DIAGNOSIS — R19.5 HEME POSITIVE STOOL: ICD-10-CM

## 2020-10-08 DIAGNOSIS — Z12.11 SCREENING FOR COLON CANCER: Primary | ICD-10-CM

## 2020-10-08 DIAGNOSIS — Z01.818 PRE-OP TESTING: Primary | ICD-10-CM

## 2020-10-08 PROCEDURE — 99244 OFF/OP CNSLTJ NEW/EST MOD 40: CPT | Performed by: SURGERY

## 2020-10-09 ENCOUNTER — TELEPHONE (OUTPATIENT)
Dept: SURGERY | Facility: CLINIC | Age: 60
End: 2020-10-09

## 2020-10-09 NOTE — TELEPHONE ENCOUNTER
Called patient home wife answered stated patient is at work gave  her time and date of Covid-19 10/20/20 @ 2:30 pm [facility] drive thru.

## 2020-10-19 ENCOUNTER — TELEPHONE (OUTPATIENT)
Dept: FAMILY MEDICINE CLINIC | Facility: CLINIC | Age: 60
End: 2020-10-19

## 2020-10-19 NOTE — TELEPHONE ENCOUNTER
Patients wife, Felicity, called and stated that the patient was referred to see Dr. James for pulmonology. She states that she had called to confirm an appointment time but was advised the patient had already missed his appointment. Felicity states that she received a letter stating that his appointment was coming at the end of the month and advised Dr. James office of this and was told to contact his primary care provider to see what the issue was. Felicity states that if the letter is correct and the appointment time is at the end of the month she would like to know the date and time. She states that she would like to hear back from a clinical staff member due to the pulmonology office not having record of the patient even though she was advised an appointment was already made for him. Please advise.     Felicity call back [phone]

## 2020-10-21 ENCOUNTER — LAB (OUTPATIENT)
Dept: LAB | Facility: HOSPITAL | Age: 60
End: 2020-10-21

## 2020-10-21 DIAGNOSIS — Z01.818 PRE-OP TESTING: ICD-10-CM

## 2020-10-21 PROCEDURE — U0004 COV-19 TEST NON-CDC HGH THRU: HCPCS | Performed by: SURGERY

## 2020-10-21 PROCEDURE — C9803 HOPD COVID-19 SPEC COLLECT: HCPCS

## 2020-10-22 ENCOUNTER — TELEPHONE (OUTPATIENT)
Dept: SURGERY | Facility: CLINIC | Age: 60
End: 2020-10-22

## 2020-10-22 LAB — SARS-COV-2 RNA RESP QL NAA+PROBE: DETECTED

## 2020-10-22 RX ORDER — BISACODYL 5 MG
TABLET, DELAYED RELEASE (ENTERIC COATED) ORAL
Qty: 4 TABLET | Refills: 0 | Status: SHIPPED | OUTPATIENT
Start: 2020-10-22 | End: 2021-01-11

## 2020-10-22 RX ORDER — POLYETHYLENE GLYCOL 3350 17 G/17G
POWDER, FOR SOLUTION ORAL
Qty: 238 G | Refills: 0 | Status: SHIPPED | OUTPATIENT
Start: 2020-10-22 | End: 2020-12-09

## 2020-10-22 NOTE — TELEPHONE ENCOUNTER
Covid 19 detected in pre-op test.  I called pt left message on voice mail asking him to return my call.  I cancelled colonoscopy @ [facility] for 10/23/2020.

## 2020-10-22 NOTE — TELEPHONE ENCOUNTER
"I called pt again, I talked with his wife, she stated that she will tell him and she and her  will quarantine for the recommended 14 days and follow-up with Dr. Elam as needed.  Pt added that both her and her  have had some \"upper respiratory symptoms with cough\" but \"not too bad.\"  I also informed Mrs. Bagley that Mr. Bagley will need 2 negative (non-detected) Covid 19 test results before rescheduling colonoscopy.   "

## 2020-10-23 ENCOUNTER — TELEPHONE (OUTPATIENT)
Dept: FAMILY MEDICINE CLINIC | Facility: CLINIC | Age: 60
End: 2020-10-23

## 2020-10-23 NOTE — TELEPHONE ENCOUNTER
Caller: Gaston Bagley    Relationship to patient: Self    Best call back number: [phone]     Concerns or Questions if Applicable: WIFE CALLED AND NOTIFIED OFFICE THAT  HAD A POSITIVE COVID TESTING ON Wednesday 10/21/20.    PATIENT WAS NOT ABLE TO KEEP HIS COLONOSCOPY  APPOINTMENT DUE TO POSITIVE RESULT    Travel screen questions: PATIENT IS NOT SHOWING ANY COVID SYMPTOMS    PLEASE ADVISE

## 2020-10-26 RX ORDER — TIZANIDINE 4 MG/1
4 TABLET ORAL EVERY 8 HOURS PRN
Qty: 90 TABLET | Refills: 2 | Status: SHIPPED | OUTPATIENT
Start: 2020-10-26 | End: 2021-02-19

## 2020-10-26 NOTE — TELEPHONE ENCOUNTER
Caller: CARLOS POLK    Relationship: Emergency Contact    Best call back number: [phone]     Medication needed:   Requested Prescriptions     Pending Prescriptions Disp Refills   • tiZANidine (ZANAFLEX) 4 MG tablet        Sig: Take 1 tablet by mouth 3 (Three) Times a Day.       When do you need the refill by: ASAP    What details did the patient provide when requesting the medication: OUT OF MEDICATION  Does the patient have less than a 3 day supply:  [x] Yes  [] No    What is the patient's preferred pharmacy: [facility] ([place]) - [address], [address] - [phone]  - [phone] FX       PLEASE CALL WHEN SENT TO PHARMACY -[phone]

## 2020-10-26 NOTE — TELEPHONE ENCOUNTER
I called and spoke with the patients wife, she states that he is only taking the tizanidine.    She states that he did have an old script of flexeril that he has taken in the past due to having such a hard time getting his tizanidine refilled.

## 2020-10-29 ENCOUNTER — TELEPHONE (OUTPATIENT)
Dept: FAMILY MEDICINE CLINIC | Facility: CLINIC | Age: 60
End: 2020-10-29

## 2020-10-29 DIAGNOSIS — Z87.891 PERSONAL HISTORY OF SMOKING: ICD-10-CM

## 2020-10-29 DIAGNOSIS — Z12.2 ENCOUNTER FOR SCREENING FOR MALIGNANT NEOPLASM OF LUNG IN CURRENT SMOKER WITH 30 PACK YEAR HISTORY OR GREATER: Primary | ICD-10-CM

## 2020-10-29 DIAGNOSIS — F17.200 ENCOUNTER FOR SCREENING FOR MALIGNANT NEOPLASM OF LUNG IN CURRENT SMOKER WITH 30 PACK YEAR HISTORY OR GREATER: Primary | ICD-10-CM

## 2020-10-29 NOTE — TELEPHONE ENCOUNTER
Pt will need a new order for LDCT. Previous order auto-closed when his precert .  I will not be able to submit this until I return to work on . There is a form that must be completed and sent to [facility] for scheduling at [facility]. If you will enter the new order, I will get it ready for sending when I RTW in person.

## 2020-10-29 NOTE — TELEPHONE ENCOUNTER
Patient's wife, Felicity, states that he needs a new order for a Lung Scan because he couldn't make the appointment that was scheduled.  She can be reached at [phone]

## 2020-11-02 ENCOUNTER — TELEPHONE (OUTPATIENT)
Dept: FAMILY MEDICINE CLINIC | Facility: CLINIC | Age: 60
End: 2020-11-02

## 2020-11-02 NOTE — TELEPHONE ENCOUNTER
pts wife called and stated that they had to cancel the low dose chest CT because he got Covid 19. He would like to r/s this.

## 2020-11-06 ENCOUNTER — OFFICE VISIT (OUTPATIENT)
Dept: FAMILY MEDICINE CLINIC | Facility: CLINIC | Age: 60
End: 2020-11-06

## 2020-11-06 DIAGNOSIS — U07.1 ACUTE BRONCHITIS DUE TO COVID-19 VIRUS: ICD-10-CM

## 2020-11-06 DIAGNOSIS — J20.8 ACUTE BRONCHITIS DUE TO COVID-19 VIRUS: ICD-10-CM

## 2020-11-06 DIAGNOSIS — J44.1 COPD WITH EXACERBATION (HCC): Primary | ICD-10-CM

## 2020-11-06 PROCEDURE — 99442 PR PHYS/QHP TELEPHONE EVALUATION 11-20 MIN: CPT | Performed by: NURSE PRACTITIONER

## 2020-11-06 RX ORDER — DOXYCYCLINE HYCLATE 100 MG/1
100 CAPSULE ORAL 2 TIMES DAILY
Qty: 14 CAPSULE | Refills: 0 | Status: SHIPPED | OUTPATIENT
Start: 2020-11-06 | End: 2020-11-13

## 2020-11-06 RX ORDER — PREDNISONE 20 MG/1
TABLET ORAL
Qty: 18 TABLET | Refills: 0 | Status: SHIPPED | OUTPATIENT
Start: 2020-11-06 | End: 2020-12-09

## 2020-11-06 NOTE — PROGRESS NOTES
Subjective     Chief Complaint:    Chief Complaint   Patient presents with   • Shortness of Breath     loss of taste, dx with covid     You have chosen to receive care through a telephone visit. Do you consent to use a telephone visit for your medical care today? Yes    History of Present Illness:   He was diagnosed with covid on 10/21 incidental on pre op testing.   He notes a few days later he was started having worsening soa. He was seen in the ED at [facility].  Diagnosed with bronchitis.  Given steroid injection.  He does have COPD and uses Advair and Incruse.  Has been using albuterol as needed.  Notes ED doctor told him not to use neb machine in his house as it can spread the virus.  For the past few days he has had return and worsening shortness of breath.  He notes cough with sputum production.  Sputum taste like infection.  Last used albuterol MDI 15 minutes ago.  Does have shortness of breath.  No fever. + Loss of taste.      ROS  Gen- No fevers, chills  CV- No chest pain, palpitations  Resp- No cough, dyspnea  GI- No N/V/D, abd pain  Neuro-No dizziness, headaches      I have reviewed and/or updated the patient's past medical, surgical, family, social history and problem list as appropriate.     Medications:    Current Outpatient Medications:   •  albuterol sulfate  (90 Base) MCG/ACT inhaler, Inhale 2 puffs Every 4 (Four) Hours As Needed for Wheezing., Disp: , Rfl:   •  atorvastatin (LIPITOR) 40 MG tablet, Take 40 mg by mouth Every Night., Disp: , Rfl:   •  bisacodyl (bisacodyl) 5 MG EC tablet, Take 2 at 3pm and 2 at 7pm the day prior to colonoscopy., Disp: 4 tablet, Rfl: 0  •  carvedilol (COREG) 12.5 MG tablet, Take 12.5 mg by mouth 2 (Two) Times a Day With Meals., Disp: , Rfl:   •  diclofenac (VOLTAREN) 1 % gel gel, , Disp: , Rfl:   •  diclofenac (VOLTAREN) 75 MG EC tablet, Take 1 tablet by mouth 2 (Two) Times a Day., Disp: 60 tablet, Rfl: 5  •  fluticasone-salmeterol (ADVAIR) 250-50  MCG/DOSE DISKUS, Inhale 1 puff 2 (Two) Times a Day., Disp: 60 each, Rfl: 5  •  gabapentin (NEURONTIN) 600 MG tablet, Take 1 tablet by mouth 3 (Three) Times a Day., Disp: 90 tablet, Rfl: 0  •  HYDROcodone-acetaminophen (NORCO) 5-325 MG per tablet, Take 1 tablet by mouth 2 (Two) Times a Day As Needed for Severe Pain ., Disp: 60 tablet, Rfl: 0  •  lidocaine (Lidoderm) 5 %, Place 1 patch on the skin as directed by provider Daily. Remove & Discard patch within 12 hours or as directed by MD, Disp: 30 patch, Rfl: 0  •  NARCAN 4 MG/0.1ML nasal spray, USE 1 SPRAY IN ONE NOSTRIL FOR 1 DOSE AS NEEDED FOR OVERSEDATION, Disp: , Rfl:   •  pantoprazole (PROTONIX) 40 MG EC tablet, Take 40 mg by mouth Daily., Disp: , Rfl:   •  polyethylene glycol (MIRALAX) 17 GM/SCOOP powder, Mix 238g powder with 64 oz of clear liquid. Starting at 5pm drink 80z every 10-15 minutes until consumed., Disp: 238 g, Rfl: 0  •  rOPINIRole (REQUIP) 0.25 MG tablet, Take 1 tablet by mouth 3 (Three) Times a Day. Take 1 hour before bedtime., Disp: 90 tablet, Rfl: 5  •  tiZANidine (ZANAFLEX) 4 MG tablet, Take 1 tablet by mouth Every 8 (Eight) Hours As Needed for Muscle Spasms., Disp: 90 tablet, Rfl: 2  •  Umeclidinium Bromide (Incruse Ellipta) 62.5 MCG/INH aerosol powder , Inhale 1 puff Daily., Disp: 30 each, Rfl: 5  •  doxycycline (VIBRAMYCIN) 100 MG capsule, Take 1 capsule by mouth 2 (Two) Times a Day for 7 days., Disp: 14 capsule, Rfl: 0  •  predniSONE (DELTASONE) 20 MG tablet, Take 60mg x 3 days, 40mg x 3 days, 20mg x 3 days, 10mg x 4 days, Disp: 18 tablet, Rfl: 0    Allergies:  No Known Allergies    Objective     Vital Signs: There were no vitals filed for this visit.    Physical Exam:  Gen-no acute distress, telephone exam  Resp- normal WOB, he is able to speak in full sentences but he is audibly wheezing, does sound soa  Neuro-A&Ox3, speech clear  Psych-appropriate mood, cooperative         Assessment / Plan     Assessment/Plan:   Problem List Items  Addressed This Visit     None      Visit Diagnoses     COPD with exacerbation (CMS/[facility])    -  Primary    Relevant Medications    doxycycline (VIBRAMYCIN) 100 MG capsule    predniSONE (DELTASONE) 20 MG tablet    Acute bronchitis due to COVID-19 virus            -- doxy and prednisone for COPD exacerbation  -- I instructed him to use a neb now  -- discussed if his soa worsens he needs to go straight to the ED    Follow up:  As needed    Total Time of Encounter 15 minutes    Electronically signed by NA Yang   11/06/2020 08:29 EST      Please note that portions of this note may have been completed with a voice recognition program. Efforts were made to edit the dictations, but occasionally words are mistranscribed.

## 2020-11-09 ENCOUNTER — TELEPHONE (OUTPATIENT)
Dept: SURGERY | Facility: CLINIC | Age: 60
End: 2020-11-09

## 2020-11-09 ENCOUNTER — TELEPHONE (OUTPATIENT)
Dept: FAMILY MEDICINE CLINIC | Facility: CLINIC | Age: 60
End: 2020-11-09

## 2020-11-09 NOTE — TELEPHONE ENCOUNTER
PARKER FROM [facility] CALLED AND STATED PATIENT RESCHEDULED HIS APPOINTMENT FOR HIS LUNG CANCER SCREEN.    SHE NEEDS US TO SEND HER THE DX CODE AND THE AUTHORIZATION FOR THE PROCEDURE.    PLEASE CONTACT PARKER TO ADVISE.    CALLBACK:  [phone]

## 2020-11-09 NOTE — TELEPHONE ENCOUNTER
PT EC CALLED STATED THAT PT HAS A UPSET STOMACHE AND REQUEST RX ZOFRAN T BE CALLED INTO PHARMACY    PLEASE ADVISE.  CALL BACK:[phone]      "[employer] ([place]) - [place], [place] - [address], [address]

## 2020-11-09 NOTE — TELEPHONE ENCOUNTER
Patient was scheduled for procedure, but patient was diagnosed with covid. Patient would like to be scheduled again.

## 2020-11-10 ENCOUNTER — APPOINTMENT (OUTPATIENT)
Dept: CT IMAGING | Facility: HOSPITAL | Age: 60
End: 2020-11-10

## 2020-11-10 NOTE — TELEPHONE ENCOUNTER
RE: OSORIO - BESSY De Anda @ [facility] sched. Advised that due to working from home, I have some difficulty processing authorizations. I will work on this when I return to work tomorrow and will call them with the requested information. Pt is not scheduled for the LDCT until 11/25

## 2020-11-11 DIAGNOSIS — Z01.818 PRE-OP TESTING: Primary | ICD-10-CM

## 2020-11-11 NOTE — TELEPHONE ENCOUNTER
BESSY De Anda @  Sched. Provided dx codes and new auth number for LDCT. Also faxed updated order and auth information to [facility] sched at [phone]

## 2020-11-17 ENCOUNTER — OFFICE VISIT (OUTPATIENT)
Dept: PULMONOLOGY | Facility: CLINIC | Age: 60
End: 2020-11-17

## 2020-11-17 VITALS
OXYGEN SATURATION: 98 % | BODY MASS INDEX: 24.11 KG/M2 | SYSTOLIC BLOOD PRESSURE: 118 MMHG | DIASTOLIC BLOOD PRESSURE: 64 MMHG | RESPIRATION RATE: 16 BRPM | TEMPERATURE: 97.1 F | HEIGHT: 72 IN | HEART RATE: 80 BPM | WEIGHT: 178 LBS

## 2020-11-17 DIAGNOSIS — J43.2 CENTRILOBULAR EMPHYSEMA (HCC): ICD-10-CM

## 2020-11-17 DIAGNOSIS — R05.9 COUGH: ICD-10-CM

## 2020-11-17 DIAGNOSIS — U07.1 COVID-19 VIRUS DETECTED: ICD-10-CM

## 2020-11-17 DIAGNOSIS — J41.8 MIXED SIMPLE AND MUCOPURULENT CHRONIC BRONCHITIS (HCC): Primary | ICD-10-CM

## 2020-11-17 DIAGNOSIS — R06.09 DYSPNEA ON EXERTION: ICD-10-CM

## 2020-11-17 PROCEDURE — 94618 PULMONARY STRESS TESTING: CPT | Performed by: INTERNAL MEDICINE

## 2020-11-17 PROCEDURE — 99204 OFFICE O/P NEW MOD 45 MIN: CPT | Performed by: INTERNAL MEDICINE

## 2020-11-17 RX ORDER — ALBUTEROL SULFATE 90 UG/1
2 AEROSOL, METERED RESPIRATORY (INHALATION) EVERY 4 HOURS PRN
Qty: 25.5 G | Refills: 3 | Status: SHIPPED | OUTPATIENT
Start: 2020-11-17 | End: 2021-01-11

## 2020-11-18 ENCOUNTER — LAB (OUTPATIENT)
Dept: LAB | Facility: HOSPITAL | Age: 60
End: 2020-11-18

## 2020-11-18 DIAGNOSIS — Z01.818 PRE-OP TESTING: ICD-10-CM

## 2020-11-18 LAB — SARS-COV-2 RNA RESP QL NAA+PROBE: NOT DETECTED

## 2020-11-18 PROCEDURE — C9803 HOPD COVID-19 SPEC COLLECT: HCPCS

## 2020-11-18 PROCEDURE — U0004 COV-19 TEST NON-CDC HGH THRU: HCPCS

## 2020-11-20 ENCOUNTER — OUTSIDE FACILITY SERVICE (OUTPATIENT)
Dept: SURGERY | Facility: CLINIC | Age: 60
End: 2020-11-20

## 2020-11-20 PROCEDURE — 45378 DIAGNOSTIC COLONOSCOPY: CPT | Performed by: SURGERY

## 2020-11-24 ENCOUNTER — TELEPHONE (OUTPATIENT)
Dept: SURGERY | Facility: CLINIC | Age: 60
End: 2020-11-24

## 2020-12-09 ENCOUNTER — OFFICE VISIT (OUTPATIENT)
Dept: FAMILY MEDICINE CLINIC | Facility: CLINIC | Age: 60
End: 2020-12-09

## 2020-12-09 VITALS
HEART RATE: 64 BPM | SYSTOLIC BLOOD PRESSURE: 160 MMHG | BODY MASS INDEX: 23.7 KG/M2 | OXYGEN SATURATION: 98 % | DIASTOLIC BLOOD PRESSURE: 90 MMHG | RESPIRATION RATE: 18 BRPM | WEIGHT: 175 LBS | TEMPERATURE: 97.5 F | HEIGHT: 72 IN

## 2020-12-09 DIAGNOSIS — F17.200 ENCOUNTER FOR SCREENING FOR MALIGNANT NEOPLASM OF LUNG IN CURRENT SMOKER WITH 30 PACK YEAR HISTORY OR GREATER: ICD-10-CM

## 2020-12-09 DIAGNOSIS — Z87.891 PERSONAL HISTORY OF TOBACCO USE: ICD-10-CM

## 2020-12-09 DIAGNOSIS — I10 ESSENTIAL HYPERTENSION: ICD-10-CM

## 2020-12-09 DIAGNOSIS — E55.9 VITAMIN D DEFICIENCY: ICD-10-CM

## 2020-12-09 DIAGNOSIS — Z12.2 ENCOUNTER FOR SCREENING FOR MALIGNANT NEOPLASM OF LUNG IN CURRENT SMOKER WITH 30 PACK YEAR HISTORY OR GREATER: ICD-10-CM

## 2020-12-09 DIAGNOSIS — E78.2 MIXED HYPERLIPIDEMIA: ICD-10-CM

## 2020-12-09 DIAGNOSIS — J44.9 CHRONIC OBSTRUCTIVE PULMONARY DISEASE, UNSPECIFIED COPD TYPE (HCC): Primary | ICD-10-CM

## 2020-12-09 PROBLEM — Z86.16 HISTORY OF 2019 NOVEL CORONAVIRUS DISEASE (COVID-19): Status: ACTIVE | Noted: 2020-12-09

## 2020-12-09 PROCEDURE — 99214 OFFICE O/P EST MOD 30 MIN: CPT | Performed by: FAMILY MEDICINE

## 2020-12-09 RX ORDER — BECLOMETHASONE DIPROPIONATE HFA 40 UG/1
AEROSOL, METERED RESPIRATORY (INHALATION)
COMMUNITY
Start: 2020-10-17 | End: 2021-01-11

## 2020-12-09 NOTE — PROGRESS NOTES
Subjective   Gaston Bagley is a 60 y.o. male.     History of Present Illness   Mr. Bagley presents today for routine f/u on several chronic med problems.    Last note reviiwed.    Since his last visit he has had consultation with pulmonology for COPD. Meds adjusted. He is down to 3-4 cigs per day.    He has seen urology, cannot recall name of physician. No new concerns.    Since last visit with me has had been dx'd, tx'd for COVID. Did not require hospitalization.    He was found to have low vit D and B12. Has started oral B12, not currently on vit D.    He has also had + cologuard and underwent colonoscopy 11/2021 which was normal, f/u 10 years.    Went to ER in [facility] for right shoulder, given shots and is getting better    Has regular f/u with cardiology for WPW. Denies new symptoms.    Taking coreg for HTN, taking statin for HLP. BP noted to be up today. Has not had meds this morning    The following portions of the patient's history were reviewed and updated as appropriate: allergies, current medications, past family history, past medical history, past social history, past surgical history and problem list.    Review of Systems   Constitutional: Positive for appetite change and fatigue. Negative for fever.   HENT: Negative for congestion, ear pain, hearing loss, nosebleeds, rhinorrhea, sneezing, sore throat, tinnitus and trouble swallowing.    Eyes: Negative for pain, discharge, redness and visual disturbance.   Respiratory: Positive for cough, shortness of breath and wheezing. Negative for chest tightness.    Cardiovascular: Negative for chest pain, palpitations and leg swelling.   Gastrointestinal: Negative for abdominal pain, blood in stool, constipation, diarrhea, nausea and vomiting.   Endocrine: Negative for cold intolerance, heat intolerance, polydipsia and polyuria.   Genitourinary: Negative for dysuria, flank pain, frequency, hematuria and urgency.   Musculoskeletal: Positive for arthralgias, back  pain and myalgias. Negative for joint swelling and neck pain.   Skin: Negative for rash and wound.   Neurological: Negative for dizziness, tremors, seizures, syncope, speech difficulty, weakness, numbness and headaches.   Hematological: Negative for adenopathy. Does not bruise/bleed easily.   Psychiatric/Behavioral: Negative for confusion, dysphoric mood, sleep disturbance and suicidal ideas. The patient is not nervous/anxious.    Pt's previous ROS reviewed and updated as indicated.       Objective    Vitals:    12/09/20 0807   BP: 160/90   Pulse: 64   Resp: 18   Temp: 97.5 °F (36.4 °C)   SpO2: 98%     Body mass index is 23.73 kg/m².      12/09/20 0807   Weight: 79.4 kg (175 lb)       Physical Exam  Vitals signs and nursing note reviewed.   Constitutional:       General: He is not in acute distress.     Appearance: He is well-developed, well-groomed and normal weight. He is not ill-appearing.      Comments: Appears older than stated age   HENT:      Head: Normocephalic and atraumatic.   Eyes:      General: No scleral icterus.     Extraocular Movements: Extraocular movements intact.      Conjunctiva/sclera: Conjunctivae normal.   Neck:      Vascular: Normal carotid pulses. No carotid bruit.   Cardiovascular:      Rate and Rhythm: Normal rate and regular rhythm.      Pulses: Decreased pulses (pedal).      Heart sounds: Heart sounds are distant.   Pulmonary:      Effort: Pulmonary effort is normal.      Breath sounds: Decreased breath sounds present. No wheezing, rhonchi or rales.   Musculoskeletal:      Right lower leg: No edema.      Left lower leg: No edema.   Skin:     General: Skin is warm and dry.      Coloration: Skin is not jaundiced or pale.   Neurological:      Mental Status: He is alert and oriented to person, place, and time.      Gait: Gait is intact.   Psychiatric:         Mood and Affect: Mood and affect normal.         Behavior: Behavior normal. Behavior is cooperative.       Colonoscopy report  reviewed and on chart.    Lab Results   Component Value Date    WBC 8.88 12/09/2020    HGB 14.9 12/09/2020    HCT 45.8 12/09/2020    MCV 87.7 12/09/2020     12/09/2020       Lab Results   Component Value Date    BUN 21 (H) 08/31/2020    CREATININE 0.87 08/31/2020    EGFRIFNONA 90 08/31/2020    EGFRIFAFRI 110 08/31/2020    BCR 24.1 08/31/2020    K 4.3 08/31/2020    CO2 28.7 08/31/2020    CALCIUM 8.9 08/31/2020    PROTENTOTREF 6.4 08/31/2020    ALBUMIN 4.60 08/31/2020    LABIL2 2.6 08/31/2020    AST 14 08/31/2020    ALT 12 08/31/2020       Lab Results   Component Value Date    CHLPL 97 08/31/2020    TRIG 57 08/31/2020    HDL 38 (L) 08/31/2020    LDL 48 08/31/2020       Lab Results   Component Value Date    HGBA1C 5.60 08/31/2020       Lab Results   Component Value Date    TSH 1.260 08/31/2020       Lab Results   Component Value Date    QYYMXIHN50 317 08/31/2020         Assessment/Plan   Diagnoses and all orders for this visit:    1. Chronic obstructive pulmonary disease, unspecified COPD type (CMS/HCC) (Primary)  -     CBC & Differential  -     Alpha - 1 - Antitrypsin    2. Essential hypertension    3. Mixed hyperlipidemia    4. Encounter for screening for malignant neoplasm of lung in current smoker with 30 pack year history or greater  -     CT Chest Low Dose Wo; Future    5. Personal history of tobacco use  -     CT Chest Low Dose Wo; Future    6. Vitamin D deficiency  -     Cholecalciferol (vitamin D3) 125 MCG (5000 UT) capsule capsule; Take 1 capsule by mouth Daily.  Dispense: 30 capsule; Refill: 5       COPD stable. F/u with pulmonology as scheduled. Continue smoking cessation efforts. Tobacco cessation advised.  Pt voiced understanding of health risks of cont'd tobacco use.  HTN not at goal. Continue current meds. Check BP at home daily and report persistent elevations.  Continue statin for HLP.  F/u with cardiology for mgnt of WPW.  Pt advised to eat a heart healthy diet and get regular aerobic  exercise.  Routine f/u in 1 months for BP recheck, f/u sooner as needed.  I will contact patient regarding test results and provide instructions regarding any necessary changes in plan of care.  Patient was encouraged to keep me informed of any acute changes, or any new concerning symptoms.  Pt is aware of reasons to seek emergent care.  Patient voiced understanding of all instructions and denied further questions.

## 2020-12-10 LAB
A1AT SERPL-MCNC: 169 MG/DL (ref 101–187)
BASOPHILS # BLD AUTO: 0.05 10*3/MM3 (ref 0–0.2)
BASOPHILS NFR BLD AUTO: 0.6 % (ref 0–1.5)
EOSINOPHIL # BLD AUTO: 0.16 10*3/MM3 (ref 0–0.4)
EOSINOPHIL NFR BLD AUTO: 1.8 % (ref 0.3–6.2)
ERYTHROCYTE [DISTWIDTH] IN BLOOD BY AUTOMATED COUNT: 12.8 % (ref 12.3–15.4)
HCT VFR BLD AUTO: 45.8 % (ref 37.5–51)
HGB BLD-MCNC: 14.9 G/DL (ref 13–17.7)
IMM GRANULOCYTES # BLD AUTO: 0.03 10*3/MM3 (ref 0–0.05)
IMM GRANULOCYTES NFR BLD AUTO: 0.3 % (ref 0–0.5)
LYMPHOCYTES # BLD AUTO: 1.88 10*3/MM3 (ref 0.7–3.1)
LYMPHOCYTES NFR BLD AUTO: 21.2 % (ref 19.6–45.3)
MCH RBC QN AUTO: 28.5 PG (ref 26.6–33)
MCHC RBC AUTO-ENTMCNC: 32.5 G/DL (ref 31.5–35.7)
MCV RBC AUTO: 87.7 FL (ref 79–97)
MONOCYTES # BLD AUTO: 0.59 10*3/MM3 (ref 0.1–0.9)
MONOCYTES NFR BLD AUTO: 6.6 % (ref 5–12)
NEUTROPHILS # BLD AUTO: 6.17 10*3/MM3 (ref 1.7–7)
NEUTROPHILS NFR BLD AUTO: 69.5 % (ref 42.7–76)
NRBC BLD AUTO-RTO: 0 /100 WBC (ref 0–0.2)
PLATELET # BLD AUTO: 288 10*3/MM3 (ref 140–450)
RBC # BLD AUTO: 5.22 10*6/MM3 (ref 4.14–5.8)
WBC # BLD AUTO: 8.88 10*3/MM3 (ref 3.4–10.8)

## 2021-01-11 ENCOUNTER — OFFICE VISIT (OUTPATIENT)
Dept: FAMILY MEDICINE CLINIC | Facility: CLINIC | Age: 61
End: 2021-01-11

## 2021-01-11 VITALS
RESPIRATION RATE: 14 BRPM | OXYGEN SATURATION: 96 % | TEMPERATURE: 97.3 F | HEIGHT: 72 IN | WEIGHT: 181 LBS | HEART RATE: 71 BPM | BODY MASS INDEX: 24.52 KG/M2 | SYSTOLIC BLOOD PRESSURE: 118 MMHG | DIASTOLIC BLOOD PRESSURE: 78 MMHG

## 2021-01-11 DIAGNOSIS — R61 NIGHT SWEATS: ICD-10-CM

## 2021-01-11 DIAGNOSIS — G89.29 CHRONIC RIGHT SHOULDER PAIN: Primary | ICD-10-CM

## 2021-01-11 DIAGNOSIS — I10 ESSENTIAL HYPERTENSION: ICD-10-CM

## 2021-01-11 DIAGNOSIS — J44.9 CHRONIC OBSTRUCTIVE PULMONARY DISEASE, UNSPECIFIED COPD TYPE (HCC): ICD-10-CM

## 2021-01-11 DIAGNOSIS — M25.511 CHRONIC RIGHT SHOULDER PAIN: Primary | ICD-10-CM

## 2021-01-11 PROCEDURE — 99214 OFFICE O/P EST MOD 30 MIN: CPT | Performed by: FAMILY MEDICINE

## 2021-01-11 NOTE — PROGRESS NOTES
"Subjective   Gaston Bagley is a 60 y.o. male.     History of Present Illness   Mr. Bagley presents today for 1 month BP recheck. Home numbers still too high. Seems higher at night. Taking meds as rx'd. Does have some \"sweating for no reason\" at night. Had sleep study 2-3 years agp  Was reportedly normal, had at home and then subsequently at sleep lab. No reported oxygen issues at that time.    He c/o aching/burning Right shoulder pain x 2 months. Intermittent before but constant and worsening x 2 weeks. 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 Tx: heat which helps some. Was seen at [facility] 1 month ago, dx'd with bursitis. Given IM steroid which helped temporarily.    He has COPD, no recent increase in cough/wheeze. Using inhaler as rx'd.    The following portions of the patient's history were reviewed and updated as appropriate: allergies, current medications, past family history, past medical history, past social history, past surgical history and problem list.    Review of Systems   Constitutional: Positive for fatigue. Negative for fever.   HENT: Negative for congestion, ear pain, hearing loss, nosebleeds, rhinorrhea, sneezing, sore throat, tinnitus and trouble swallowing.    Eyes: Negative for pain, discharge, redness and visual disturbance.   Respiratory: Positive for cough, shortness of breath and wheezing. Negative for chest tightness.    Cardiovascular: Negative for chest pain, palpitations and leg swelling.   Gastrointestinal: Negative for abdominal pain, blood in stool, constipation, diarrhea, nausea and vomiting.   Endocrine: Negative for cold intolerance, heat intolerance, polydipsia and polyuria.   Genitourinary: Negative for dysuria, flank pain, frequency, hematuria and urgency.   Musculoskeletal: Positive for arthralgias, back pain and myalgias. Negative for joint swelling and neck pain.   Skin: Negative for rash and wound.   Neurological: Negative for " dizziness, tremors, seizures, syncope, speech difficulty, weakness, numbness and headaches.   Hematological: Negative for adenopathy. Does not bruise/bleed easily.   Psychiatric/Behavioral: Negative for confusion, dysphoric mood and sleep disturbance. The patient is not nervous/anxious.    Pt's previous ROS reviewed and updated as indicated.       Objective    Vitals:    01/11/21 0849   BP: 118/78   Pulse: 71   Resp: 14   Temp: 97.3 °F (36.3 °C)   SpO2: 96%     Body mass index is 24.55 kg/m².      01/11/21  0849   Weight: 82.1 kg (181 lb)       Physical Exam  Vitals signs and nursing note reviewed.   Constitutional:       General: He is not in acute distress.     Appearance: He is well-developed, well-groomed and normal weight. He is not ill-appearing.      Comments: Appears older than stated age   HENT:      Head: Normocephalic and atraumatic.   Eyes:      General: No scleral icterus.     Extraocular Movements: Extraocular movements intact.      Conjunctiva/sclera: Conjunctivae normal.   Cardiovascular:      Rate and Rhythm: Normal rate and regular rhythm.      Pulses: Decreased pulses (pedal).      Heart sounds: Heart sounds are distant.   Pulmonary:      Effort: Pulmonary effort is normal.      Breath sounds: Decreased breath sounds present. No wheezing, rhonchi or rales.   Musculoskeletal:      Right shoulder: He exhibits decreased range of motion (limited flexion to approx 90 deg, abduction to approx 120 deg, limited internal rotation with + impingement signs), bony tenderness (anterior GH joint), abnormal pulse and decreased strength.      Right lower leg: No edema.      Left lower leg: No edema.   Skin:     General: Skin is warm and dry.      Coloration: Skin is not jaundiced or pale.      Findings: No rash.   Neurological:      Mental Status: He is alert and oriented to person, place, and time.      Sensory: Sensation is intact.      Motor: Motor function is intact.      Gait: Gait is intact.   Psychiatric:          Mood and Affect: Mood and affect normal.         Behavior: Behavior normal. Behavior is cooperative.         Cognition and Memory: Cognition normal.     Pt's previous physical exam reviewed and updated as indicated.    Lab Results   Component Value Date    WBC 8.88 12/09/2020    HGB 14.9 12/09/2020    HCT 45.8 12/09/2020    MCV 87.7 12/09/2020     12/09/2020       Lab Results   Component Value Date    BUN 21 (H) 08/31/2020    CREATININE 0.87 08/31/2020    EGFRIFNONA 90 08/31/2020    EGFRIFAFRI 110 08/31/2020    BCR 24.1 08/31/2020    K 4.3 08/31/2020    CO2 28.7 08/31/2020    CALCIUM 8.9 08/31/2020    PROTENTOTREF 6.4 08/31/2020    ALBUMIN 4.60 08/31/2020    LABIL2 2.6 08/31/2020    AST 14 08/31/2020    ALT 12 08/31/2020       Lab Results   Component Value Date    CHLPL 97 08/31/2020    TRIG 57 08/31/2020    HDL 38 (L) 08/31/2020    LDL 48 08/31/2020       Lab Results   Component Value Date    HGBA1C 5.60 08/31/2020       Lab Results   Component Value Date    TSH 1.260 08/31/2020     LDCT for lung CA screening 11/25/2020- neg/stable    Assessment/Plan   Diagnoses and all orders for this visit:    1. Chronic right shoulder pain (Primary)    2. Essential hypertension    3. Night sweats  -     Overnight Sleep Oximetry Study; Future    4. Chronic obstructive pulmonary disease, unspecified COPD type (CMS/HCC)  -     Overnight Sleep Oximetry Study; Future       Acute on chronic right shoulder pain due to bursitis/tendonitis with suspected underlying OA. I have reviewed risks/benefits and potential side effects of various treatment options. Pt voices understanding and wishes to proceed with being scheduled for shoulder injection. Continue conservative treatmetn measures as well.     HTN uncontrolled at home. Bring in meter for recheck with our cuff. Continue coreg. Adjust tx as indicated.    Concern for possible nocturnal hypoxia related to COPD and night sweats, etc. Overnight pulse ox as above.    F/u for  shoulder injection at earliest convenience., f/u sooner as needed/instructed.  I will contact patient regarding test results and provide instructions regarding any necessary changes in plan of care.  Patient was encouraged to keep me informed of any acute changes, lack of improvement, or any new concerning symptoms.  Pt is aware of reasons to seek emergent care.  Patient voiced understanding of all instructions and denied further questions.

## 2021-01-19 ENCOUNTER — PROCEDURE VISIT (OUTPATIENT)
Dept: FAMILY MEDICINE CLINIC | Facility: CLINIC | Age: 61
End: 2021-01-19

## 2021-01-19 VITALS
TEMPERATURE: 97.3 F | RESPIRATION RATE: 14 BRPM | WEIGHT: 181 LBS | HEIGHT: 72 IN | OXYGEN SATURATION: 95 % | BODY MASS INDEX: 24.52 KG/M2 | HEART RATE: 82 BPM | SYSTOLIC BLOOD PRESSURE: 122 MMHG | DIASTOLIC BLOOD PRESSURE: 80 MMHG

## 2021-01-19 DIAGNOSIS — G47.34 NOCTURNAL HYPOXIA: ICD-10-CM

## 2021-01-19 DIAGNOSIS — G89.29 CHRONIC RIGHT SHOULDER PAIN: Primary | ICD-10-CM

## 2021-01-19 DIAGNOSIS — M25.511 CHRONIC RIGHT SHOULDER PAIN: Primary | ICD-10-CM

## 2021-01-19 DIAGNOSIS — J44.9 CHRONIC OBSTRUCTIVE PULMONARY DISEASE, UNSPECIFIED COPD TYPE (HCC): ICD-10-CM

## 2021-01-19 DIAGNOSIS — Z99.81 DEPENDENCE ON NOCTURNAL OXYGEN THERAPY: ICD-10-CM

## 2021-01-19 DIAGNOSIS — I10 ESSENTIAL HYPERTENSION: ICD-10-CM

## 2021-01-19 PROCEDURE — 20610 DRAIN/INJ JOINT/BURSA W/O US: CPT | Performed by: FAMILY MEDICINE

## 2021-01-19 PROCEDURE — 99213 OFFICE O/P EST LOW 20 MIN: CPT | Performed by: FAMILY MEDICINE

## 2021-01-19 RX ORDER — LIDOCAINE HYDROCHLORIDE 10 MG/ML
8 INJECTION, SOLUTION INFILTRATION; PERINEURAL ONCE
Status: COMPLETED | OUTPATIENT
Start: 2021-01-19 | End: 2021-01-19

## 2021-01-19 RX ORDER — TRIAMCINOLONE ACETONIDE 40 MG/ML
40 INJECTION, SUSPENSION INTRA-ARTICULAR; INTRAMUSCULAR ONCE
Status: COMPLETED | OUTPATIENT
Start: 2021-01-19 | End: 2021-01-19

## 2021-01-19 RX ADMIN — TRIAMCINOLONE ACETONIDE 40 MG: 40 INJECTION, SUSPENSION INTRA-ARTICULAR; INTRAMUSCULAR at 09:22

## 2021-01-19 RX ADMIN — LIDOCAINE HYDROCHLORIDE 8 ML: 10 INJECTION, SOLUTION INFILTRATION; PERINEURAL at 09:21

## 2021-01-19 NOTE — PROGRESS NOTES
Subjective   Gaston Bagley is a 60 y.o. male.     History of Present Illness  Mr. Bagley presents today for injection of right shoulder due to persistent pain and loss of ROM.    At his last visit earlier this month, he c/o aching/burning Right shoulder pain x 2 months. Intermittent before but constant and worsening x 2 weeks. 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 Tx: heat which helps some. Was seen at [facility] 1 month ago, dx'd with bursitis. Given IM steroid which helped temporarily. We discussed tx/diagnostic options and he wishes to proceed with injection first.    No acute complaints otherwise today.    In addition he had overnight pulse oximetry. Has COPD, was c/o fatigue, night sweats. Overnight pulse ox reviewed with pt today. Had >200 mins with O2 sat less than 88%. Is amenable to oxygen supplementation qhs. No recent increase cough, SOA, wheeze. No CP, hemoptysis, no fever. Currently on trelegy, incruse .    Has HTN. BP looking good in office but he reports hypertensive readings at home. Forgot to bring his home BP monitor for check in office today. Taking coreg as directed.    The following portions of the patient's history were reviewed and updated as appropriate: allergies, current medications, past family history, past medical history, past social history, past surgical history and problem list.    Review of Systems   Constitutional: Positive for fatigue. Negative for fever.   HENT: Negative for congestion, ear pain, hearing loss, nosebleeds, rhinorrhea, sneezing, sore throat, tinnitus and trouble swallowing.    Eyes: Negative for pain, discharge, redness and visual disturbance.   Respiratory: Positive for cough, shortness of breath and wheezing. Negative for chest tightness.    Cardiovascular: Negative for chest pain, palpitations and leg swelling.   Gastrointestinal: Negative for abdominal pain, blood in stool, constipation, diarrhea, nausea and  vomiting.   Endocrine: Negative for cold intolerance, heat intolerance, polydipsia and polyuria.        Night sweats   Genitourinary: Negative for dysuria, flank pain, frequency, hematuria and urgency.   Musculoskeletal: Positive for arthralgias, back pain and myalgias. Negative for joint swelling and neck pain.   Skin: Negative for rash and wound.   Neurological: Negative for dizziness, tremors, seizures, syncope, speech difficulty, weakness, numbness and headaches.   Hematological: Negative for adenopathy. Does not bruise/bleed easily.   Psychiatric/Behavioral: Negative for confusion, dysphoric mood and sleep disturbance. The patient is not nervous/anxious.    Pt's previous ROS reviewed and updated as indicated.       Objective    Vitals:    01/19/21 0844   BP: 122/80   Pulse: 82   Resp: 14   Temp: 97.3 °F (36.3 °C)   SpO2: 95%     Body mass index is 24.55 kg/m².      01/19/21  0844   Weight: 82.1 kg (181 lb)       Physical Exam  Vitals signs and nursing note reviewed.   Constitutional:       General: He is not in acute distress.     Appearance: He is well-developed, well-groomed and normal weight. He is not ill-appearing.      Comments: Appears older than stated age   HENT:      Head: Normocephalic and atraumatic.   Cardiovascular:      Pulses:           Radial pulses are 2+ on the right side.   Pulmonary:      Effort: Pulmonary effort is normal.   Musculoskeletal:      Right shoulder: He exhibits decreased range of motion (limited flexion to approx 90 deg, abduction to approx 120 deg, limited internal rotation with + impingement signs), bony tenderness (anterior GH joint), abnormal pulse and decreased strength.   Skin:     General: Skin is warm and dry.      Findings: No bruising or rash.   Neurological:      Mental Status: He is alert and oriented to person, place, and time.      Sensory: Sensation is intact.      Motor: Motor function is intact.   Psychiatric:         Mood and Affect: Mood and affect normal.          Behavior: Behavior normal. Behavior is cooperative.         Cognition and Memory: Cognition normal.       Pt's previous physical exam reviewed and updated as indicated.    PULSE OXIMETRY REPORT REVIEWED WITH PATIENT.    Assessment/Plan   Diagnoses and all orders for this visit:    1. Chronic right shoulder pain (Primary)  -     Joint Aspiration/Injection  -     triamcinolone acetonide (KENALOG-40) injection 40 mg  -     lidocaine (XYLOCAINE) 1 % injection 8 mL  -     Arthrocentesis    2. Chronic obstructive pulmonary disease, unspecified COPD type (CMS/HCC)  -     Oxygen Therapy    3. Nocturnal hypoxia  -     Oxygen Therapy    4. Dependence on nocturnal oxygen therapy  -     Oxygen Therapy    5. Essential hypertension      I suspect he has shoulder pain due to bursitis/tendonitis and underlying OA. I have reviewed risks/benefits and potential side effects of various treatment options for chronic right shoulder pain with loss of ROM. Pt voices understanding and wishes to proceed with therapeutic injection. See procedure note below. If minimal improvement proceed with imaging and referral as indicated.    Copd with nocturnal hypoxia. Oxygen order as above. Continue COPD meds.    HTN controlled in office today. Encouraged to bring in home monitor to next apt.    F/u in 1 month, sooner as needed.  Patient was encouraged to keep me informed of any acute changes, lack of improvement, or any new concerning symptoms.  Pt is aware of reasons to seek emergent care.  Patient voiced understanding of all instructions and denied further questions.          PROCEDURE NOTE  Procedure: Arthrocentesis right shoulder  Informed consent obtainedM  Arthrocentesis    Date/Time: 1/19/2021 10:19 AM  Performed by: Katalina Elam MD  Authorized by: Katalina Elam MD   Indications: pain and diagnostic evaluation   Body area: shoulder  Joint: right shoulder  Local anesthesia used: no    Anesthesia:  Local anesthesia used:  no    Sedation:  Patient sedated: no    Preparation: Patient was prepped and draped in the usual sterile fashion.  Needle gauge: 25 G 1 and 1/2 inch.  Ultrasound guidance: no  Approach: posterior  Aspirate amount: 0 mL  Patient tolerance: patient tolerated the procedure well with no immediate complications  Comments: Medications included: 40 mg Kenalog, 8 ml 1% Lidocaine without Epi  1/2 total volume injection to Subacromial space, 1/2 to GH joint using one skin entry point.  Post procedure care reviewed. Pt voiced understanding and denied further questions.

## 2021-02-19 RX ORDER — TIZANIDINE 4 MG/1
4 TABLET ORAL EVERY 8 HOURS PRN
Qty: 90 TABLET | Refills: 0 | Status: SHIPPED | OUTPATIENT
Start: 2021-02-19 | End: 2021-06-11 | Stop reason: SDUPTHER

## 2021-02-24 ENCOUNTER — OFFICE VISIT (OUTPATIENT)
Dept: FAMILY MEDICINE CLINIC | Facility: CLINIC | Age: 61
End: 2021-02-24

## 2021-02-24 VITALS
HEART RATE: 84 BPM | BODY MASS INDEX: 23.98 KG/M2 | HEIGHT: 72 IN | DIASTOLIC BLOOD PRESSURE: 75 MMHG | TEMPERATURE: 97.4 F | SYSTOLIC BLOOD PRESSURE: 120 MMHG | WEIGHT: 177 LBS | OXYGEN SATURATION: 98 %

## 2021-02-24 DIAGNOSIS — I45.6 WOLFF-PARKINSON-WHITE SYNDROME: ICD-10-CM

## 2021-02-24 DIAGNOSIS — E78.2 MIXED HYPERLIPIDEMIA: ICD-10-CM

## 2021-02-24 DIAGNOSIS — Z99.81 DEPENDENCE ON NOCTURNAL OXYGEN THERAPY: ICD-10-CM

## 2021-02-24 DIAGNOSIS — G47.34 NOCTURNAL HYPOXIA: ICD-10-CM

## 2021-02-24 DIAGNOSIS — J44.9 CHRONIC OBSTRUCTIVE PULMONARY DISEASE, UNSPECIFIED COPD TYPE (HCC): ICD-10-CM

## 2021-02-24 DIAGNOSIS — R61 NIGHT SWEATS: ICD-10-CM

## 2021-02-24 DIAGNOSIS — I47.1 PAROXYSMAL SVT (SUPRAVENTRICULAR TACHYCARDIA) (HCC): ICD-10-CM

## 2021-02-24 DIAGNOSIS — I10 ESSENTIAL HYPERTENSION: Primary | ICD-10-CM

## 2021-02-24 DIAGNOSIS — G25.81 RLS (RESTLESS LEGS SYNDROME): ICD-10-CM

## 2021-02-24 DIAGNOSIS — Z12.5 SCREENING FOR PROSTATE CANCER: ICD-10-CM

## 2021-02-24 PROCEDURE — 99214 OFFICE O/P EST MOD 30 MIN: CPT | Performed by: FAMILY MEDICINE

## 2021-02-24 RX ORDER — ALBUTEROL SULFATE 90 UG/1
AEROSOL, METERED RESPIRATORY (INHALATION) EVERY 6 HOURS PRN
COMMUNITY
Start: 2021-01-21 | End: 2021-02-26 | Stop reason: SDUPTHER

## 2021-02-24 RX ORDER — ROPINIROLE 0.25 MG/1
0.25 TABLET, FILM COATED ORAL 3 TIMES DAILY
Qty: 90 TABLET | Refills: 5 | Status: SHIPPED | OUTPATIENT
Start: 2021-02-24 | End: 2021-09-22

## 2021-02-24 NOTE — PROGRESS NOTES
"Subjective   Gaston Bagley is a 60 y.o. male.     History of Present Illness   Mr. Bagley presents today for f/u on several problems.    Had shoulder injection last visit, helped significantly. Much improved ROM.    Has HTN, was getting higher readings at home compared to clinic. Has brought in BP cuff today, and readings are similar. BP well controlled today. Has not checked as much as homer.    He c/o bilateral heel pain, worst after prolonged standing/at end of day. Tried insoles to no effect.    Has COPD Since last visit he has started night-time oxygen. Wife feels it has helped him significantly. He is \"not snoring as much\".    He is still having \"bad night sweats\". Having occ plapitations, fast heart rate. Has previous dx of SVT and WPW. Currently on coreg from cardiology. Reports being told he \"didn't have to come back unless he had a problem\".    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    Taking his statin as rx'd for HLP. Denies worsening myalgias.    The following portions of the patient's history were reviewed and updated as appropriate: allergies, current medications, past family history, past medical history, past social history, past surgical history and problem list.    Review of Systems   Constitutional: Positive for fatigue. Negative for fever.   HENT: Negative for congestion, ear pain, hearing loss, nosebleeds, rhinorrhea, sneezing, sore throat, tinnitus and trouble swallowing.    Eyes: Negative for pain, discharge, redness and visual disturbance.   Respiratory: Positive for cough, shortness of breath and wheezing. Negative for chest tightness.    Cardiovascular: Positive for palpitations. Negative for chest pain and leg swelling.   Gastrointestinal: Negative for abdominal pain, blood in stool, constipation, diarrhea, nausea and vomiting.   Endocrine: Negative for cold intolerance, heat intolerance, polydipsia and polyuria.        Night sweats   Genitourinary: Negative for dysuria, flank pain, frequency, " hematuria and urgency.   Musculoskeletal: Positive for arthralgias, back pain and myalgias. Negative for joint swelling and neck pain.   Skin: Negative for rash and wound.   Neurological: Negative for dizziness, tremors, seizures, syncope, speech difficulty, weakness, numbness and headaches.   Hematological: Negative for adenopathy. Does not bruise/bleed easily.   Psychiatric/Behavioral: Negative for confusion, dysphoric mood and sleep disturbance. The patient is not nervous/anxious.    Pt's previous ROS reviewed and updated as indicated.       Objective    Vitals:    02/24/21 0854   BP: 120/75   Pulse: 84   Temp: 97.4 °F (36.3 °C)   SpO2: 98%     Body mass index is 24 kg/m².      02/24/21  0854   Weight: 80.3 kg (177 lb)       Physical Exam  Vitals signs and nursing note reviewed.   Constitutional:       General: He is not in acute distress.     Appearance: He is well-developed, well-groomed and normal weight. He is not ill-appearing.      Comments: Appears older than stated age   HENT:      Head: Normocephalic and atraumatic.   Eyes:      General: No scleral icterus.     Extraocular Movements: Extraocular movements intact.      Conjunctiva/sclera: Conjunctivae normal.   Neck:      Thyroid: No thyroid mass.      Vascular: Normal carotid pulses. No carotid bruit.   Cardiovascular:      Rate and Rhythm: Normal rate and regular rhythm.      Pulses: Decreased pulses (pedal).      Heart sounds: Heart sounds are distant.   Pulmonary:      Effort: Pulmonary effort is normal.      Breath sounds: Decreased breath sounds present. No wheezing, rhonchi or rales.   Musculoskeletal:      Right lower leg: No edema.      Left lower leg: No edema.   Lymphadenopathy:      Cervical: No cervical adenopathy.   Skin:     General: Skin is warm and dry.      Coloration: Skin is not jaundiced or pale.      Findings: No rash.   Neurological:      Mental Status: He is alert and oriented to person, place, and time.      Motor: No tremor.       Gait: Gait is intact.   Psychiatric:         Mood and Affect: Mood and affect normal.         Behavior: Behavior normal. Behavior is cooperative.         Cognition and Memory: Cognition normal.     Pt's previous physical exam reviewed and updated as indicated.    Lab Results   Component Value Date    WBC 8.88 12/09/2020    HGB 14.9 12/09/2020    HCT 45.8 12/09/2020    MCV 87.7 12/09/2020     12/09/2020       Lab Results   Component Value Date    BUN 21 (H) 08/31/2020    CREATININE 0.87 08/31/2020    EGFRIFNONA 90 08/31/2020    EGFRIFAFRI 110 08/31/2020    BCR 24.1 08/31/2020    K 4.3 08/31/2020    CO2 28.7 08/31/2020    CALCIUM 8.9 08/31/2020    PROTENTOTREF 6.4 08/31/2020    ALBUMIN 4.60 08/31/2020    LABIL2 2.6 08/31/2020    AST 14 08/31/2020    ALT 12 08/31/2020       Lab Results   Component Value Date    CHLPL 97 08/31/2020    TRIG 57 08/31/2020    HDL 38 (L) 08/31/2020    LDL 48 08/31/2020       Lab Results   Component Value Date    HGBA1C 5.60 08/31/2020       Lab Results   Component Value Date    TSH 1.260 08/31/2020       Assessment/Plan   Diagnoses and all orders for this visit:    1. Essential hypertension (Primary)    2. RLS (restless legs syndrome)    3. Night sweats  -     Holter Monitor - 48 Hour; Future    4. Screening for prostate cancer  -     PSA Screen    5. Nocturnal hypoxia    6. Dependence on nocturnal oxygen therapy    7. Chronic obstructive pulmonary disease, unspecified COPD type (CMS/[facility])    8. Kalyani-Parkinson-White syndrome  -     Holter Monitor - 48 Hour; Future    9. Paroxysmal SVT (supraventricular tachycardia) (CMS/[facility])  -     Holter Monitor - 48 Hour; Future    10. Mixed hyperlipidemia    Other orders  -     rOPINIRole (REQUIP) 0.25 MG tablet; Take 1 tablet by mouth 3 (Three) Times a Day. Take 1 hour before bedtime.  Dispense: 90 tablet; Refill: 5       HTN controlled. Continue coreg.    HLP with good tolerance of statin. LDL at goal. Pt advised to eat a heart healthy diet and  get regular aerobic exercise.    COPD with nocturnal oxygen dependency. Feeling improved with oxygen, less fatigued. Sleep quality improved. Continue incruse ellipta and trelegy.    Night sweats of unclear etiology. Some concern as he c/o recurrence of palpitations. No presyncope/syncope. Holter as above.    Routine f/u in 3 months, sooner as needed/instructed.  I will contact patient regarding test results and provide instructions regarding any necessary changes in plan of care.  Patient was encouraged to keep me informed of any acute changes, lack of improvement, or any new concerning symptoms.  Pt is aware of reasons to seek emergent care.  Patient voiced understanding of all instructions and denied further questions.

## 2021-02-25 LAB — PSA SERPL-MCNC: 1.01 NG/ML (ref 0–4)

## 2021-02-25 RX ORDER — HYDROCODONE BITARTRATE AND ACETAMINOPHEN 5; 325 MG/1; MG/1
1 TABLET ORAL 2 TIMES DAILY PRN
COMMUNITY

## 2021-02-25 RX ORDER — GABAPENTIN 600 MG/1
600 TABLET ORAL 2 TIMES DAILY
COMMUNITY

## 2021-02-26 ENCOUNTER — OFFICE VISIT (OUTPATIENT)
Dept: PULMONOLOGY | Facility: CLINIC | Age: 61
End: 2021-02-26

## 2021-02-26 ENCOUNTER — LAB (OUTPATIENT)
Dept: LAB | Facility: HOSPITAL | Age: 61
End: 2021-02-26

## 2021-02-26 VITALS
RESPIRATION RATE: 20 BRPM | HEART RATE: 68 BPM | HEIGHT: 72 IN | BODY MASS INDEX: 24.11 KG/M2 | WEIGHT: 178 LBS | TEMPERATURE: 98 F | OXYGEN SATURATION: 98 % | SYSTOLIC BLOOD PRESSURE: 118 MMHG | DIASTOLIC BLOOD PRESSURE: 80 MMHG

## 2021-02-26 DIAGNOSIS — R06.09 DYSPNEA ON EXERTION: ICD-10-CM

## 2021-02-26 DIAGNOSIS — J43.2 CENTRILOBULAR EMPHYSEMA (HCC): ICD-10-CM

## 2021-02-26 DIAGNOSIS — R05.9 COUGH: ICD-10-CM

## 2021-02-26 DIAGNOSIS — R61 NIGHT SWEAT: ICD-10-CM

## 2021-02-26 DIAGNOSIS — J41.8 MIXED SIMPLE AND MUCOPURULENT CHRONIC BRONCHITIS (HCC): ICD-10-CM

## 2021-02-26 DIAGNOSIS — Z72.0 TOBACCO ABUSE: ICD-10-CM

## 2021-02-26 DIAGNOSIS — J44.9 MODERATE COPD (CHRONIC OBSTRUCTIVE PULMONARY DISEASE) (HCC): Primary | ICD-10-CM

## 2021-02-26 LAB
ALPHA1 GLOB MFR UR ELPH: 173 MG/DL (ref 90–200)
BASOPHILS # BLD AUTO: 0.04 10*3/MM3 (ref 0–0.2)
BASOPHILS NFR BLD AUTO: 0.5 % (ref 0–1.5)
DEPRECATED RDW RBC AUTO: 42.2 FL (ref 37–54)
EOSINOPHIL # BLD AUTO: 0.09 10*3/MM3 (ref 0–0.4)
EOSINOPHIL NFR BLD AUTO: 1.2 % (ref 0.3–6.2)
ERYTHROCYTE [DISTWIDTH] IN BLOOD BY AUTOMATED COUNT: 13.1 % (ref 12.3–15.4)
HCT VFR BLD AUTO: 44.7 % (ref 37.5–51)
HGB BLD-MCNC: 15.1 G/DL (ref 13–17.7)
IMM GRANULOCYTES # BLD AUTO: 0.03 10*3/MM3 (ref 0–0.05)
IMM GRANULOCYTES NFR BLD AUTO: 0.4 % (ref 0–0.5)
LYMPHOCYTES # BLD AUTO: 1.78 10*3/MM3 (ref 0.7–3.1)
LYMPHOCYTES NFR BLD AUTO: 23.4 % (ref 19.6–45.3)
MCH RBC QN AUTO: 29.5 PG (ref 26.6–33)
MCHC RBC AUTO-ENTMCNC: 33.8 G/DL (ref 31.5–35.7)
MCV RBC AUTO: 87.5 FL (ref 79–97)
MONOCYTES # BLD AUTO: 0.38 10*3/MM3 (ref 0.1–0.9)
MONOCYTES NFR BLD AUTO: 5 % (ref 5–12)
NEUTROPHILS NFR BLD AUTO: 5.29 10*3/MM3 (ref 1.7–7)
NEUTROPHILS NFR BLD AUTO: 69.5 % (ref 42.7–76)
NRBC BLD AUTO-RTO: 0 /100 WBC (ref 0–0.2)
PLATELET # BLD AUTO: 223 10*3/MM3 (ref 140–450)
PMV BLD AUTO: 10.6 FL (ref 6–12)
RBC # BLD AUTO: 5.11 10*6/MM3 (ref 4.14–5.8)
WBC # BLD AUTO: 7.61 10*3/MM3 (ref 3.4–10.8)

## 2021-02-26 PROCEDURE — 87385 HISTOPLASMA CAPSUL AG IA: CPT

## 2021-02-26 PROCEDURE — 85025 COMPLETE CBC W/AUTO DIFF WBC: CPT

## 2021-02-26 PROCEDURE — 94060 EVALUATION OF WHEEZING: CPT | Performed by: INTERNAL MEDICINE

## 2021-02-26 PROCEDURE — 82103 ALPHA-1-ANTITRYPSIN TOTAL: CPT

## 2021-02-26 PROCEDURE — 86003 ALLG SPEC IGE CRUDE XTRC EA: CPT

## 2021-02-26 PROCEDURE — 87449 NOS EACH ORGANISM AG IA: CPT

## 2021-02-26 PROCEDURE — 86635 COCCIDIOIDES ANTIBODY: CPT

## 2021-02-26 PROCEDURE — 94729 DIFFUSING CAPACITY: CPT | Performed by: INTERNAL MEDICINE

## 2021-02-26 PROCEDURE — 94726 PLETHYSMOGRAPHY LUNG VOLUMES: CPT | Performed by: INTERNAL MEDICINE

## 2021-02-26 PROCEDURE — 86480 TB TEST CELL IMMUN MEASURE: CPT

## 2021-02-26 PROCEDURE — 36415 COLL VENOUS BLD VENIPUNCTURE: CPT

## 2021-02-26 PROCEDURE — 99214 OFFICE O/P EST MOD 30 MIN: CPT | Performed by: INTERNAL MEDICINE

## 2021-02-26 RX ORDER — TIOTROPIUM BROMIDE INHALATION SPRAY 3.12 UG/1
2 SPRAY, METERED RESPIRATORY (INHALATION)
Qty: 90 EACH | Refills: 3 | Status: SHIPPED | OUTPATIENT
Start: 2021-02-26 | End: 2021-08-26

## 2021-02-26 RX ORDER — ALBUTEROL SULFATE 90 UG/1
2 AEROSOL, METERED RESPIRATORY (INHALATION) EVERY 6 HOURS PRN
Qty: 17 G | Refills: 5 | Status: SHIPPED | OUTPATIENT
Start: 2021-02-26 | End: 2021-08-27 | Stop reason: SDUPTHER

## 2021-02-26 RX ORDER — BUDESONIDE AND FORMOTEROL FUMARATE DIHYDRATE 160; 4.5 UG/1; UG/1
2 AEROSOL RESPIRATORY (INHALATION)
Qty: 3 EACH | Refills: 3 | Status: SHIPPED | OUTPATIENT
Start: 2021-02-26 | End: 2021-05-25

## 2021-02-26 RX ORDER — VARENICLINE TARTRATE 1 MG/1
1 TABLET, FILM COATED ORAL 2 TIMES DAILY
Qty: 60 TABLET | Refills: 1 | Status: SHIPPED | OUTPATIENT
Start: 2021-02-26 | End: 2021-05-07

## 2021-02-26 NOTE — PROGRESS NOTES
Pulmonary follow-up office Visit      Chief Complaint:    Chief Complaint   Patient presents with   • Follow-up   • lung problem       History of Present Illness: Gaston Bagley is a 60 y.o. male who is here today to follow up for moderate COPD.    Since last visit, started on overnight oxygen after overnight pulse ox +. Negative sleep study a few years ago.  Using trelegy with good results, but not covered by insurance    Duration: COPD diagnosed in about 2018  Severity: moderate with fev1 68%  Associated Symptoms: chronic cough with white mucus, + intermittent wheezing, no hemoptysis, but has noticed night sweats for years  Exercise Tolerance: able to walk on flat ground for 5-10 minutes  Timing: daily  Exacerbating Factors: moderate exertion, recent cold weather changes  Relieving Factors: rest and inhalers  Current Regimen: Trelegy and prn albuterol 1-2x per day    Restarted smoking  No known TB exposure  Works as     Brother has asthma, but he was never diagnosed as a child.       Subjective      Review of Systems:   Review of Systems   Constitutional: Negative for chills, fatigue, fever, unexpected weight gain and unexpected weight loss.   HENT: Negative for postnasal drip, rhinorrhea, sinus pressure, trouble swallowing and voice change.    Eyes: Negative for discharge and itching.   Respiratory: Positive for cough and shortness of breath. Negative for wheezing.    Cardiovascular: Negative for chest pain, palpitations and leg swelling.   Gastrointestinal: Negative for abdominal distention, constipation, diarrhea and GERD.   Endocrine: Negative for cold intolerance and heat intolerance.   Genitourinary: Negative for dysuria and hematuria.   Musculoskeletal: Negative for arthralgias and myalgias.   Skin: Negative for color change and rash.   Allergic/Immunologic: Negative for environmental allergies and food allergies.   Neurological: Negative for dizziness, speech difficulty, light-headedness and  memory problem.   Hematological: Negative for adenopathy. Does not bruise/bleed easily.   Psychiatric/Behavioral: Negative for sleep disturbance and depressed mood. The patient is nervous/anxious.        Medications:     Current Outpatient Medications:   •  albuterol sulfate  (90 Base) MCG/ACT inhaler, Inhale 2 puffs Every 6 (Six) Hours As Needed for Wheezing or Shortness of Air., Disp: 17 g, Rfl: 5  •  atorvastatin (LIPITOR) 40 MG tablet, Take 40 mg by mouth Every Night., Disp: , Rfl:   •  carvedilol (COREG) 12.5 MG tablet, Take 12.5 mg by mouth 2 (Two) Times a Day With Meals., Disp: , Rfl:   •  Cholecalciferol (vitamin D3) 125 MCG (5000 UT) capsule capsule, Take 1 capsule by mouth Daily., Disp: 30 capsule, Rfl: 5  •  diclofenac (VOLTAREN) 1 % gel gel, , Disp: , Rfl:   •  gabapentin (NEURONTIN) 600 MG tablet, Take 600 mg by mouth 3 (Three) Times a Day., Disp: , Rfl:   •  HYDROcodone-acetaminophen (NORCO) 5-325 MG per tablet, Take 1 tablet by mouth 2 (Two) Times a Day As Needed for Severe Pain ., Disp: , Rfl:   •  NARCAN 4 MG/0.1ML nasal spray, USE 1 SPRAY IN ONE NOSTRIL FOR 1 DOSE AS NEEDED FOR OVERSEDATION, Disp: , Rfl:   •  O2 (OXYGEN), Inhale 2 L/min 1 (One) Time. At night, Disp: , Rfl:   •  pantoprazole (PROTONIX) 40 MG EC tablet, Take 40 mg by mouth Daily., Disp: , Rfl:   •  rOPINIRole (REQUIP) 0.25 MG tablet, Take 1 tablet by mouth 3 (Three) Times a Day. Take 1 hour before bedtime., Disp: 90 tablet, Rfl: 5  •  tiZANidine (ZANAFLEX) 4 MG tablet, TAKE 1 TABLET BY MOUTH EVERY 8 (EIGHT) HOURS AS NEEDED FOR MUSCLE SPASMS., Disp: 90 tablet, Rfl: 0  •  budesonide-formoterol (Symbicort) 160-4.5 MCG/ACT inhaler, Inhale 2 puffs 2 (Two) Times a Day for 90 days., Disp: 3 each, Rfl: 3  •  tiotropium bromide monohydrate (Spiriva Respimat) 2.5 MCG/ACT aerosol solution inhaler, Inhale 2 puffs Daily., Disp: 90 each, Rfl: 3  •  varenicline (CHANTIX ISABELLA) 0.5 MG X 11 & 1 MG X 42 tablet, Use as directed on package  "instructions, try to quit smoking after 1 week, Disp: 53 tablet, Rfl: 0  •  varenicline (CHANTIX) 1 MG tablet, Take 1 tablet by mouth 2 (Two) Times a Day., Disp: 60 tablet, Rfl: 1    Allergies:   No Known Allergies    Objective     Physical Exam:  Vital Signs:   Vitals:    02/26/21 1014   BP: 118/80   Pulse: 68   Resp: 20   Temp: 98 °F (36.7 °C)   SpO2: 98%   Weight: 80.7 kg (178 lb)   Height: 182.9 cm (72\")       Physical Exam  Vitals signs and nursing note reviewed.   Constitutional:       General: He is not in acute distress.     Appearance: He is well-developed.      Comments: Appears older than stated age   HENT:      Head: Normocephalic and atraumatic.      Right Ear: External ear normal.      Left Ear: External ear normal.      Nose: No congestion or rhinorrhea.      Mouth/Throat:      Mouth: Mucous membranes are moist.      Pharynx: Oropharynx is clear. No oropharyngeal exudate.   Eyes:      General: No scleral icterus.        Right eye: No discharge.         Left eye: No discharge.      Extraocular Movements: Extraocular movements intact.   Neck:      Musculoskeletal: Normal range of motion and neck supple.      Trachea: No tracheal deviation.   Cardiovascular:      Rate and Rhythm: Normal rate and regular rhythm.   Pulmonary:      Effort: No accessory muscle usage or respiratory distress.      Breath sounds: Wheezing present.      Comments: Decreased breath sounds throughout with expiratory wheeze  Abdominal:      General: There is no distension.      Palpations: Abdomen is soft.   Musculoskeletal: Normal range of motion.      Right lower leg: No edema.      Left lower leg: No edema.   Skin:     General: Skin is warm and dry.      Coloration: Skin is not jaundiced.      Findings: No rash.   Neurological:      General: No focal deficit present.      Mental Status: He is alert and oriented to person, place, and time.      Motor: No weakness.      Gait: Gait normal.   Psychiatric:         Mood and Affect: " Mood normal.         Behavior: Behavior normal.         Thought Content: Thought content normal.         Judgment: Judgment normal.         Results Review:   Labs: Reviewed.  Lab Results   Component Value Date    WBC 8.88 12/09/2020    HGB 14.9 12/09/2020    HCT 45.8 12/09/2020    MCV 87.7 12/09/2020     12/09/2020     Lab Results   Component Value Date    BUN 21 (H) 08/31/2020    CREATININE 0.87 08/31/2020    EGFRIFNONA 90 08/31/2020    EGFRIFAFRI 110 08/31/2020    BCR 24.1 08/31/2020    K 4.3 08/31/2020    CO2 28.7 08/31/2020    CALCIUM 8.9 08/31/2020    PROTENTOTREF 6.4 08/31/2020    ALBUMIN 4.60 08/31/2020    LABIL2 2.6 08/31/2020    AST 14 08/31/2020    ALT 12 08/31/2020 2021 alpha-1 antitrypsin level 169    Micro: As of February 26, 2021   No results found for: RESPCX  No results found for: BLOODCX  No results found for: URINECX  No results found for: MRSACX  No results found for: MRSAPCR  No results found for: URCX  No components found for: LOWRESPCF  No results found for: THROATCX  No results found for: CULTURES  No components found for: STREPBCX  No results found for: STREPPNEUAG  No results found for: LEGIONELLA  No results found for: MYCOPLASCX  No results found for: GCCX  No results found for: WOUNDCX  No results found for: BODYFLDCX    ABG: No results found for: PHART, WNQ4MBD, PO2ART, HGBBG, E6FBFYER, CFIO2, FCOHB, CARBOXYHGB, FMETHB    Radiology Scans:   Nov 2020 Ct chest with stable 5mm nodules, no lymphadenopathy      PFT IMPRESSION:    February 2021 PFT showed FEV1 68%, FEV1/FVC ratio 56.  Significant bronchodilator responsiveness.  Air-trapping noted.  No hyperinflation.  Decreased diffusing capacity.    Assessment / Plan      Assessment/Plan:    1. Centrilobular emphysema (CMS/HCC)  Patient with moderate COPD and centrilobular emphysema and insurance was not covering Trelegy.  Samples given in clinic of Trelegy as well as patient assistance paperwork.  We will try switching to  Symbicort and Spiriva to see if this is better reimbursed by his insurance.  Discussed risk, benefits and possible side effects of medication.  Instructed to use on a consistent basis as prescribed.  Advised to rinse his mouth out after each use.    2. Moderate COPD (chronic obstructive pulmonary disease) (CMS/[facility])  Reviewed PFTs with patient.  Management as detailed above  - budesonide-formoterol (Symbicort) 160-4.5 MCG/ACT inhaler; Inhale 2 puffs 2 (Two) Times a Day for 90 days.  Dispense: 3 each; Refill: 3  - tiotropium bromide monohydrate (Spiriva Respimat) 2.5 MCG/ACT aerosol solution inhaler; Inhale 2 puffs Daily.  Dispense: 90 each; Refill: 3    3. Tobacco abuse  Ongoing tobacco abuse.  Tobacco cessation counseling for greater than 5 minutes.  Patient is willing to try medications to try to help him quit.  Admits that cravings are the biggest issue.  Is willing to try Chantix.  Discussed risk, benefits and possible side effects of medication including worsening depression, suicide and/or vivid dreams.  Encouraged him to not take second dose close to bedtime to minimize this risk.  Advised to stop medication if the symptoms occur.  Prescription sent for starting pack and continuing pack for 2 months.  4. Dyspnea on exertion  Most likely multifactorial given his moderate COPD and continued smoking.  Management as above    5. Cough  Most likely multifactorial given his COPD and tobacco abuse.  Management as above    6. Night sweat  Night sweats for years and unclear etiology.  We will go ahead and check fungal serologies as well as QuantiFERON to rule out fungal disease and/or tuberculosis.  - Aspergillus Fumigatus IgE; Future  - Coccidioides Ag Quant EIA - , Arm, Left; Future  - QuantiFERON TB Plus Client Incubated; Future  - Histoplasma Ag Ur - Urine, Clean Catch; Future  - Blastomyces Antigen - Blood, Arm, Left; Future           Follow Up:   Return in about 6 months (around 8/26/2021).    Gaye James,  MD  Pulmonary/Critical Care Physician   [place]      Please note that portions of this note may have been completed with a voice recognition program. Efforts were made to edit the dictations, but occasionally words are mistranscribed.

## 2021-03-01 LAB
A FUMIGATUS IGE QN: <0.1 KU/L
H CAPSUL AG UR QL IA: <0.5
Lab: NORMAL

## 2021-03-02 ENCOUNTER — TELEPHONE (OUTPATIENT)
Dept: FAMILY MEDICINE CLINIC | Facility: CLINIC | Age: 61
End: 2021-03-02

## 2021-03-02 NOTE — TELEPHONE ENCOUNTER
PT SPOUSE CALLED STATED THAT SHE WAS TOLD THAT THE PT HAD TO GET HIS MONITOR DONE HERE.    PLEASE ADVISE.  CALL BACK:[phone]

## 2021-03-03 LAB
MVISTA(R) BLASTOMYCES AG: NORMAL NG/ML
SPECIMEN SOURCE: NORMAL

## 2021-03-04 LAB
C IMMITIS IGM SER-ACNC: 0 IV
COCCIDIOIDES IGG SER-ACNC: 0.3 IV
REF LAB TEST METHOD: NORMAL
REF LAB TEST METHOD: NORMAL

## 2021-03-05 LAB
GAMMA INTERFERON BACKGROUND BLD IA-ACNC: 0.05 IU/ML
M TB IFN-G BLD-IMP: NEGATIVE
M TB IFN-G CD4+ BCKGRND COR BLD-ACNC: 0.05 IU/ML
M TB IFN-G CD4+CD8+ BCKGRND COR BLD-ACNC: 0.06 IU/ML
MITOGEN IGNF BLD-ACNC: >10 IU/ML
SERVICE CMNT-IMP: NORMAL

## 2021-04-15 ENCOUNTER — PREP FOR SURGERY (OUTPATIENT)
Dept: OTHER | Facility: HOSPITAL | Age: 61
End: 2021-04-15

## 2021-04-15 DIAGNOSIS — M54.16 LUMBAR RADICULOPATHY: ICD-10-CM

## 2021-04-15 DIAGNOSIS — G89.4 CHRONIC PAIN SYNDROME: Primary | ICD-10-CM

## 2021-04-15 NOTE — H&P
[facility]         History and Physical    MRN: [id number]  Patient Name: Gaston Bagley  : 1960  Gender: male   Physician: Edson Figueroa DO  Date of Service: 4/15/2021    HPI: BACK PAIN    History:    Past Medical History:   Diagnosis Date   • Arthritis    • COPD (chronic obstructive pulmonary disease) (CMS/HCC)    • Hyperlipidemia    • Hypertension    • Right cornea abrasion    • WPW (Kalyani-Parkinson-White syndrome)        Past Surgical History:   Procedure Laterality Date   • BACK SURGERY  2018    L4-L5 lumbar fusion Dr. Carlos Enrique Moore   • CARDIAC CATHETERIZATION  2020   • FACIAL RECONSTRUCTION SURGERY         Social History     Socioeconomic History   • Marital status:      Spouse name: Not on file   • Number of children: Not on file   • Years of education: Not on file   • Highest education level: Not on file   Tobacco Use   • Smoking status: Current Every Day Smoker     Packs/day: 0.50     Years: 43.00     Pack years: 21.50     Types: Cigarettes     Start date:      Last attempt to quit: 10/28/2020     Years since quittin.4   • Smokeless tobacco: Never Used   • Tobacco comment: cutting back   Substance and Sexual Activity   • Alcohol use: Never   • Drug use: Never   • Sexual activity: Yes     Partners: Female       Family History   Problem Relation Age of Onset   • Heart attack Mother 62   • Stroke Mother 75   • Arthritis Father    • Arthritis Brother    • Heart attack Brother 58   • Hypertension Brother    • Arthritis Brother    • Arthritis Brother    • Hypertension Brother    • Throat cancer Brother    • Arthritis Brother    • Hypertension Brother    • Arthritis Brother    • Stroke Brother 45       Prior to Admission Medications:  (Not in a hospital admission)      Allergies:  No Known Allergies     Vitals: BP: ()/()   Arterial Line BP: ()/()      Review of Systems:   Within Normal Limits Abnormal   HEENT [x]    []     Cardiovascular [x]   []     Gastrointestinal [x]    []     Genitourinary [x]   []     Neurologic [x]   []     Pulmonary [x]   []       Physical Exam:   Within Normal Limits Abnormal   Head, Neck, and Throat [x]    []     Heart [x]   []     Lungs [x]   []     Abdomen [x]   []     Extremities [x]   []     Mental Status [x]    []        Diagnosis: G89.4, M54.16    Plan: SPINAL CORD STIMULATOR INSERTION PHASE 2 (N/A)     [x]   H&P revealed no contraindication to planned surgery.     [x]   Labs (if ordered) have been reviewed and revealed no contraindications to planned surgery.    RISKS, BENEFITS, and ALTERNATIVES discussed with patient who would like to proceed.     Edson Figueroa,   4/15/2021

## 2021-04-22 RX ORDER — DICLOFENAC SODIUM 75 MG/1
TABLET, DELAYED RELEASE ORAL
Qty: 60 TABLET | Refills: 4 | Status: SHIPPED | OUTPATIENT
Start: 2021-04-22 | End: 2021-09-21

## 2021-05-03 PROBLEM — M54.16 LUMBAR RADICULOPATHY: Status: ACTIVE | Noted: 2021-05-03

## 2021-05-03 PROBLEM — G89.4 CHRONIC PAIN SYNDROME: Status: ACTIVE | Noted: 2021-05-03

## 2021-05-07 ENCOUNTER — OFFICE VISIT (OUTPATIENT)
Dept: FAMILY MEDICINE CLINIC | Facility: CLINIC | Age: 61
End: 2021-05-07

## 2021-05-07 VITALS
SYSTOLIC BLOOD PRESSURE: 110 MMHG | HEIGHT: 72 IN | WEIGHT: 179 LBS | DIASTOLIC BLOOD PRESSURE: 70 MMHG | TEMPERATURE: 97.1 F | BODY MASS INDEX: 24.24 KG/M2 | OXYGEN SATURATION: 97 % | HEART RATE: 77 BPM

## 2021-05-07 DIAGNOSIS — R42 LIGHTHEADED: Primary | ICD-10-CM

## 2021-05-07 DIAGNOSIS — I45.6 WOLFF-PARKINSON-WHITE SYNDROME: ICD-10-CM

## 2021-05-07 DIAGNOSIS — I10 ESSENTIAL HYPERTENSION: ICD-10-CM

## 2021-05-07 DIAGNOSIS — I47.1 PAROXYSMAL SVT (SUPRAVENTRICULAR TACHYCARDIA) (HCC): ICD-10-CM

## 2021-05-07 LAB — GLUCOSE BLDC GLUCOMTR-MCNC: 120 MG/DL (ref 70–130)

## 2021-05-07 PROCEDURE — 82962 GLUCOSE BLOOD TEST: CPT | Performed by: NURSE PRACTITIONER

## 2021-05-07 PROCEDURE — 99214 OFFICE O/P EST MOD 30 MIN: CPT | Performed by: NURSE PRACTITIONER

## 2021-05-11 ENCOUNTER — TRANSCRIBE ORDERS (OUTPATIENT)
Dept: LAB | Facility: HOSPITAL | Age: 61
End: 2021-05-11

## 2021-05-11 DIAGNOSIS — Z20.822 COVID-19 VIRUS NOT DETECTED: ICD-10-CM

## 2021-05-11 DIAGNOSIS — Z20.828 EXPOSURE TO SARS-ASSOCIATED CORONAVIRUS: Primary | ICD-10-CM

## 2021-05-18 ENCOUNTER — LAB (OUTPATIENT)
Dept: LAB | Facility: HOSPITAL | Age: 61
End: 2021-05-18

## 2021-05-18 DIAGNOSIS — Z20.822 COVID-19 VIRUS NOT DETECTED: ICD-10-CM

## 2021-05-18 DIAGNOSIS — Z20.828 EXPOSURE TO SARS-ASSOCIATED CORONAVIRUS: ICD-10-CM

## 2021-05-18 PROCEDURE — U0004 COV-19 TEST NON-CDC HGH THRU: HCPCS

## 2021-05-18 PROCEDURE — C9803 HOPD COVID-19 SPEC COLLECT: HCPCS

## 2021-05-18 PROCEDURE — 93000 ELECTROCARDIOGRAM COMPLETE: CPT | Performed by: NURSE PRACTITIONER

## 2021-05-19 LAB — SARS-COV-2 RNA NOSE QL NAA+PROBE: NOT DETECTED

## 2021-05-19 RX ORDER — ACETAMINOPHEN 500 MG
500 TABLET ORAL EVERY 4 HOURS PRN
COMMUNITY

## 2021-05-20 ENCOUNTER — ANESTHESIA (OUTPATIENT)
Dept: PERIOP | Facility: HOSPITAL | Age: 61
End: 2021-05-20

## 2021-05-20 ENCOUNTER — APPOINTMENT (OUTPATIENT)
Dept: GENERAL RADIOLOGY | Facility: HOSPITAL | Age: 61
End: 2021-05-20

## 2021-05-20 ENCOUNTER — HOSPITAL ENCOUNTER (OUTPATIENT)
Facility: HOSPITAL | Age: 61
Setting detail: HOSPITAL OUTPATIENT SURGERY
Discharge: HOME | End: 2021-05-20
Attending: ANESTHESIOLOGY | Admitting: ANESTHESIOLOGY

## 2021-05-20 ENCOUNTER — ANESTHESIA EVENT (OUTPATIENT)
Dept: PERIOP | Facility: HOSPITAL | Age: 61
End: 2021-05-20

## 2021-05-20 VITALS
HEIGHT: 69 IN | TEMPERATURE: 97.7 F | SYSTOLIC BLOOD PRESSURE: 112 MMHG | RESPIRATION RATE: 24 BRPM | WEIGHT: 174 LBS | HEART RATE: 95 BPM | DIASTOLIC BLOOD PRESSURE: 81 MMHG | OXYGEN SATURATION: 94 % | BODY MASS INDEX: 25.77 KG/M2

## 2021-05-20 PROCEDURE — 25010000002 PROPOFOL 10 MG/ML EMULSION: Performed by: NURSE ANESTHETIST, CERTIFIED REGISTERED

## 2021-05-20 PROCEDURE — C1897 LEAD, NEUROSTIM TEST KIT: HCPCS | Performed by: ANESTHESIOLOGY

## 2021-05-20 PROCEDURE — C1822 GEN, NEURO, HF, RECHG BAT: HCPCS | Performed by: ANESTHESIOLOGY

## 2021-05-20 PROCEDURE — C1889 IMPLANT/INSERT DEVICE, NOC: HCPCS | Performed by: ANESTHESIOLOGY

## 2021-05-20 PROCEDURE — L8689 EXTERNAL RECHARG SYS INTERN: HCPCS | Performed by: ANESTHESIOLOGY

## 2021-05-20 PROCEDURE — 25010000003 CEFAZOLIN SODIUM-DEXTROSE 2-3 GM-%(50ML) RECONSTITUTED SOLUTION: Performed by: ANESTHESIOLOGY

## 2021-05-20 PROCEDURE — 76000 FLUOROSCOPY <1 HR PHYS/QHP: CPT

## 2021-05-20 PROCEDURE — 25010000002 ONDANSETRON PER 1 MG: Performed by: NURSE ANESTHETIST, CERTIFIED REGISTERED

## 2021-05-20 PROCEDURE — 25010000002 MIDAZOLAM PER 1MG: Performed by: NURSE ANESTHETIST, CERTIFIED REGISTERED

## 2021-05-20 PROCEDURE — 25010000002 VANCOMYCIN 1 G RECONSTITUTED SOLUTION 1 EACH VIAL: Performed by: ANESTHESIOLOGY

## 2021-05-20 PROCEDURE — 25010000002 VANCOMYCIN PER 500 MG: Performed by: ANESTHESIOLOGY

## 2021-05-20 PROCEDURE — 25010000002 FENTANYL CITRATE (PF) 50 MCG/ML SOLUTION: Performed by: NURSE ANESTHETIST, CERTIFIED REGISTERED

## 2021-05-20 DEVICE — BLUE LEAD KIT, 50CM WITH 5MM SPACING
Type: IMPLANTABLE DEVICE | Site: BACK | Status: FUNCTIONAL
Brand: NEVRO®

## 2021-05-20 DEVICE — N300 LEAD ANCHOR KIT
Type: IMPLANTABLE DEVICE | Site: BACK | Status: FUNCTIONAL
Brand: NEVRO®

## 2021-05-20 DEVICE — KT IPG NEUROSTIM SENZA/OMNIA: Type: IMPLANTABLE DEVICE | Site: BACK | Status: FUNCTIONAL

## 2021-05-20 RX ORDER — VANCOMYCIN HYDROCHLORIDE 500 MG/10ML
INJECTION, POWDER, LYOPHILIZED, FOR SOLUTION INTRAVENOUS AS NEEDED
Status: DISCONTINUED | OUTPATIENT
Start: 2021-05-20 | End: 2021-05-20 | Stop reason: HOSPADM

## 2021-05-20 RX ORDER — MIDAZOLAM HYDROCHLORIDE 2 MG/2ML
INJECTION, SOLUTION INTRAMUSCULAR; INTRAVENOUS AS NEEDED
Status: DISCONTINUED | OUTPATIENT
Start: 2021-05-20 | End: 2021-05-20 | Stop reason: SURG

## 2021-05-20 RX ORDER — PROPOFOL 10 MG/ML
VIAL (ML) INTRAVENOUS AS NEEDED
Status: DISCONTINUED | OUTPATIENT
Start: 2021-05-20 | End: 2021-05-20 | Stop reason: SURG

## 2021-05-20 RX ORDER — ONDANSETRON 2 MG/ML
INJECTION INTRAMUSCULAR; INTRAVENOUS AS NEEDED
Status: DISCONTINUED | OUTPATIENT
Start: 2021-05-20 | End: 2021-05-20 | Stop reason: SURG

## 2021-05-20 RX ORDER — FENTANYL CITRATE 50 UG/ML
INJECTION, SOLUTION INTRAMUSCULAR; INTRAVENOUS AS NEEDED
Status: DISCONTINUED | OUTPATIENT
Start: 2021-05-20 | End: 2021-05-20 | Stop reason: SURG

## 2021-05-20 RX ORDER — BUPIVACAINE HYDROCHLORIDE 5 MG/ML
INJECTION, SOLUTION EPIDURAL; INTRACAUDAL AS NEEDED
Status: DISCONTINUED | OUTPATIENT
Start: 2021-05-20 | End: 2021-05-20 | Stop reason: HOSPADM

## 2021-05-20 RX ORDER — MAGNESIUM HYDROXIDE 1200 MG/15ML
LIQUID ORAL AS NEEDED
Status: DISCONTINUED | OUTPATIENT
Start: 2021-05-20 | End: 2021-05-20 | Stop reason: HOSPADM

## 2021-05-20 RX ORDER — LIDOCAINE HYDROCHLORIDE 20 MG/ML
INJECTION, SOLUTION INTRAVENOUS AS NEEDED
Status: DISCONTINUED | OUTPATIENT
Start: 2021-05-20 | End: 2021-05-20 | Stop reason: SURG

## 2021-05-20 RX ORDER — CEFAZOLIN SODIUM 2 G/50ML
2 SOLUTION INTRAVENOUS ONCE
Status: COMPLETED | OUTPATIENT
Start: 2021-05-20 | End: 2021-05-20

## 2021-05-20 RX ORDER — SODIUM CHLORIDE 0.9 % (FLUSH) 0.9 %
10 SYRINGE (ML) INJECTION AS NEEDED
Status: DISCONTINUED | OUTPATIENT
Start: 2021-05-20 | End: 2021-05-20 | Stop reason: HOSPADM

## 2021-05-20 RX ORDER — SODIUM CHLORIDE, SODIUM LACTATE, POTASSIUM CHLORIDE, CALCIUM CHLORIDE 600; 310; 30; 20 MG/100ML; MG/100ML; MG/100ML; MG/100ML
1000 INJECTION, SOLUTION INTRAVENOUS CONTINUOUS
Status: DISCONTINUED | OUTPATIENT
Start: 2021-05-20 | End: 2021-05-20 | Stop reason: HOSPADM

## 2021-05-20 RX ORDER — LIDOCAINE HYDROCHLORIDE 20 MG/ML
INJECTION, SOLUTION INFILTRATION; PERINEURAL AS NEEDED
Status: DISCONTINUED | OUTPATIENT
Start: 2021-05-20 | End: 2021-05-20 | Stop reason: HOSPADM

## 2021-05-20 RX ORDER — KETAMINE HYDROCHLORIDE 50 MG/ML
INJECTION, SOLUTION, CONCENTRATE INTRAMUSCULAR; INTRAVENOUS AS NEEDED
Status: DISCONTINUED | OUTPATIENT
Start: 2021-05-20 | End: 2021-05-20 | Stop reason: SURG

## 2021-05-20 RX ADMIN — CEFAZOLIN SODIUM 2 G: 2 SOLUTION INTRAVENOUS at 07:49

## 2021-05-20 RX ADMIN — KETAMINE HYDROCHLORIDE 10 MG: 50 INJECTION, SOLUTION INTRAMUSCULAR; INTRAVENOUS at 08:36

## 2021-05-20 RX ADMIN — ONDANSETRON 4 MG: 2 INJECTION INTRAMUSCULAR; INTRAVENOUS at 07:46

## 2021-05-20 RX ADMIN — LIDOCAINE HYDROCHLORIDE 60 MG: 20 INJECTION, SOLUTION INTRAVENOUS at 07:46

## 2021-05-20 RX ADMIN — PROPOFOL 50 MCG/KG/MIN: 10 INJECTION, EMULSION INTRAVENOUS at 07:46

## 2021-05-20 RX ADMIN — FENTANYL CITRATE 50 MCG: 50 INJECTION INTRAMUSCULAR; INTRAVENOUS at 07:46

## 2021-05-20 RX ADMIN — SODIUM CHLORIDE, POTASSIUM CHLORIDE, SODIUM LACTATE AND CALCIUM CHLORIDE: 600; 310; 30; 20 INJECTION, SOLUTION INTRAVENOUS at 07:46

## 2021-05-20 RX ADMIN — KETAMINE HYDROCHLORIDE 10 MG: 50 INJECTION, SOLUTION INTRAMUSCULAR; INTRAVENOUS at 07:46

## 2021-05-20 RX ADMIN — PROPOFOL 35 MG: 10 INJECTION, EMULSION INTRAVENOUS at 07:46

## 2021-05-20 RX ADMIN — MIDAZOLAM HYDROCHLORIDE 2 MG: 1 INJECTION, SOLUTION INTRAMUSCULAR; INTRAVENOUS at 07:46

## 2021-05-20 NOTE — ANESTHESIA PREPROCEDURE EVALUATION
Anesthesia Evaluation     Patient summary reviewed and Nursing notes reviewed   no history of anesthetic complications:  NPO Solid Status: > 8 hours  NPO Liquid Status: > 8 hours           Airway   Mallampati: II  TM distance: >3 FB  Neck ROM: full  No difficulty expected  Dental - normal exam     Pulmonary - normal exam   (+) a smoker Current, COPD,     ROS comment: Smoker 21 pack years  Cardiovascular - normal exam  Exercise tolerance: unable to assess    ECG reviewed  Patient on routine beta blocker and Beta blocker given within 24 hours of surgery    (+) hypertension less than 2 medications, dysrhythmias, hyperlipidemia,     ROS comment: ECG 12 Lead     Date/Time: 5/18/2021 10:26 AM   Performed by: Karuna Olson APRN   Authorized by: Karuna Olson APRN   Comparison: compared with previous ECG   Rhythm: sinus rhythm   Q waves: V1 and V2     ST Segments: ST segments normal     Clinical impression: abnormal EKG   Comments: EKG with very poor tracing.  Compared to previous.  Discussed   with PCP.  He does appear to go from wide QRS to normal QRS rhythm    Neuro/Psych- negative ROS  GI/Hepatic/Renal/Endo    (+)  GERD,      Musculoskeletal     (+) chronic pain,   Abdominal  - normal exam   Substance History - negative use  (-) alcohol use, drug use     OB/GYN negative ob/gyn ROS         Other   arthritis,      ROS/Med Hx Other: Family history of premature CAD  Essential hypertension  Kalyani-Parkinson-White syndrome  Gastroesophageal reflux disease without esophagitis  Chronic obstructive pulmonary disease (CMS/HCC)  Mixed hyperlipidemia  Multiple thyroid nodules  Paroxysmal SVT (supraventricular tachycardia) (CMS/HCC)  Pulmonary hypertension (CMS/HCC)  Moderate tricuspid regurgitation  Vitamin B 12 deficiency  DDD (degenerative disc disease), lumbar  Hypogonadism in male  Vitamin D deficiency  RLS (restless legs syndrome)  History of 2019 novel coronavirus disease (COVID-19)  Nocturnal hypoxia  Dependence on  nocturnal oxygen therapy  Chronic pain syndrome  Lumbar radiculopathy    Hx of facial reconstruction sx                  Anesthesia Plan    ASA 3     MAC   (Patient advised that intravenous sedation would be used as the primary anesthetic, along with local anesthesia if necessary. Every effort will be made to make sure the patient is comfortable.     The patient was told that they may experience recall of the procedure. The patient was further advised that if the MAC anesthetic was deemed ineffective that general anesthesia administered via LMA or ETT may be required.    Patient verbalized understanding and agreed to plan of care. )  intravenous induction     Anesthetic plan, all risks, benefits, and alternatives have been provided, discussed and informed consent has been obtained with: patient.    Plan discussed with CRNA.

## 2021-05-20 NOTE — ANESTHESIA POSTPROCEDURE EVALUATION
Patient: Gaston Bagley    Procedure Summary     Date: 05/20/21 Room / Location: [facility] OR 3 /  [facility]    Anesthesia Start: 0742 Anesthesia Stop: 0912    Procedure: SPINAL CORD STIMULATOR INSERTION PHASE 2 (N/A Back) Diagnosis:       Chronic pain syndrome      Lumbar radiculopathy      (Chronic pain syndrome [G89.4])      (Lumbar radiculopathy [M54.16])    Surgeons: Edson Figueroa DO Provider: Tulio Elliott CRNA    Anesthesia Type: MAC ASA Status: 3          Anesthesia Type: MAC    Vitals  Vitals Value Taken Time   /81 05/20/21 1002   Temp 97.7 °F (36.5 °C) 05/20/21 0917   Pulse 95 05/20/21 1002   Resp 24 05/20/21 1002   SpO2 94 % 05/20/21 1002           Post Anesthesia Care and Evaluation    Patient location during evaluation: PHASE II  Patient participation: complete - patient participated  Level of consciousness: awake and sleepy but conscious  Pain management: adequate  Airway patency: patent  Anesthetic complications: No anesthetic complications  PONV Status: none  Cardiovascular status: acceptable and hemodynamically stable  Respiratory status: acceptable, room air, nonlabored ventilation and spontaneous ventilation  Hydration status: acceptable

## 2021-05-25 ENCOUNTER — OFFICE VISIT (OUTPATIENT)
Dept: FAMILY MEDICINE CLINIC | Facility: CLINIC | Age: 61
End: 2021-05-25

## 2021-05-25 VITALS
WEIGHT: 175 LBS | SYSTOLIC BLOOD PRESSURE: 140 MMHG | HEART RATE: 72 BPM | BODY MASS INDEX: 25.84 KG/M2 | RESPIRATION RATE: 18 BRPM | OXYGEN SATURATION: 97 % | DIASTOLIC BLOOD PRESSURE: 82 MMHG | TEMPERATURE: 97.3 F

## 2021-05-25 DIAGNOSIS — E78.2 MIXED HYPERLIPIDEMIA: ICD-10-CM

## 2021-05-25 DIAGNOSIS — I10 ESSENTIAL HYPERTENSION: Primary | ICD-10-CM

## 2021-05-25 DIAGNOSIS — J44.9 CHRONIC OBSTRUCTIVE PULMONARY DISEASE, UNSPECIFIED COPD TYPE (HCC): ICD-10-CM

## 2021-05-25 DIAGNOSIS — F17.200 TOBACCO USE DISORDER: ICD-10-CM

## 2021-05-25 PROCEDURE — 99214 OFFICE O/P EST MOD 30 MIN: CPT | Performed by: FAMILY MEDICINE

## 2021-05-25 RX ORDER — VALSARTAN 40 MG/1
40 TABLET ORAL DAILY
Qty: 30 TABLET | Refills: 5 | Status: SHIPPED | OUTPATIENT
Start: 2021-05-25 | End: 2022-03-28 | Stop reason: SDUPTHER

## 2021-05-25 RX ORDER — ATORVASTATIN CALCIUM 40 MG/1
40 TABLET, FILM COATED ORAL NIGHTLY
Qty: 30 TABLET | Refills: 5 | Status: SHIPPED | OUTPATIENT
Start: 2021-05-25 | End: 2021-11-23

## 2021-05-25 RX ORDER — ATORVASTATIN CALCIUM 40 MG/1
TABLET, FILM COATED ORAL
Qty: 30 TABLET | Refills: 4 | OUTPATIENT
Start: 2021-05-25

## 2021-05-25 RX ORDER — OXYCODONE HYDROCHLORIDE AND ACETAMINOPHEN 5; 325 MG/1; MG/1
TABLET ORAL EVERY 12 HOURS SCHEDULED
COMMUNITY
End: 2021-08-26

## 2021-05-25 NOTE — PROGRESS NOTES
Subjective   Gaston Bagley is a 60 y.o. male.     History of Present Illness   Mr. Bagley presents today for routine f/u on several chronic med problems.     Since last visit has had spinal cord stimulator placed per Dr. Figueroa. No new focal neuro symptoms.    BP was running high 3 weeks ago, when standing getting dizzy, occ when lying in bed and turning head. Had eval here at clinic. BP still running higher, first thing this morning 154/96, pulse 76. Currently on coreg only. No CP.    Saw cardiology  1 year ago. Will be seeing Dr. Upton, apt 6/10/2021    Dizzy spells some better    Due for labs; needs fasting labs    Saw pulm in 2/2021 adjusted inhalers, breathing improved.    Otherwise no new complaints today.    The following portions of the patient's history were reviewed and updated as appropriate: allergies, current medications, past family history, past medical history, past social history, past surgical history and problem list.    Review of Systems   Constitutional: Positive for fatigue. Negative for fever.   HENT: Negative for congestion, ear pain, hearing loss, nosebleeds, rhinorrhea, sneezing, sore throat, tinnitus and trouble swallowing.    Eyes: Negative for pain, discharge, redness and visual disturbance.   Respiratory: Positive for cough, shortness of breath and wheezing. Negative for chest tightness.    Cardiovascular: Positive for palpitations. Negative for chest pain and leg swelling.   Gastrointestinal: Negative for abdominal pain, blood in stool, constipation, diarrhea, nausea and vomiting.   Endocrine: Negative for cold intolerance, heat intolerance, polydipsia and polyuria.        Night sweats   Genitourinary: Negative for dysuria, flank pain, frequency, hematuria and urgency.   Musculoskeletal: Positive for arthralgias, back pain and myalgias. Negative for joint swelling and neck pain.   Skin: Negative for rash and wound.   Neurological: Negative for dizziness, tremors, seizures, syncope,  speech difficulty, weakness, numbness and headaches.   Hematological: Negative for adenopathy. Does not bruise/bleed easily.   Psychiatric/Behavioral: Negative for confusion, dysphoric mood and sleep disturbance. The patient is not nervous/anxious.    Pt's previous ROS reviewed and updated as indicated.       Objective    Vitals:    05/25/21 0856   BP: 140/82   Pulse: 72   Resp: 18   Temp: 97.3 °F (36.3 °C)   SpO2: 97%     Body mass index is 25.84 kg/m².      05/25/21  0856   Weight: 79.4 kg (175 lb)         Physical Exam  Vitals and nursing note reviewed.   Constitutional:       General: He is not in acute distress.     Appearance: He is well-developed, well-groomed and normal weight. He is not ill-appearing.      Comments: Appears older than stated age   HENT:      Head: Normocephalic and atraumatic.   Eyes:      General: No scleral icterus.     Extraocular Movements: Extraocular movements intact.      Conjunctiva/sclera: Conjunctivae normal.   Neck:      Thyroid: No thyroid mass.      Vascular: Normal carotid pulses. No carotid bruit.   Cardiovascular:      Rate and Rhythm: Normal rate and regular rhythm.      Pulses: Decreased pulses (pedal).      Heart sounds: Heart sounds are distant.   Pulmonary:      Effort: Pulmonary effort is normal.      Breath sounds: Decreased breath sounds present. No wheezing, rhonchi or rales.   Musculoskeletal:      Right lower leg: No edema.      Left lower leg: No edema.   Lymphadenopathy:      Cervical: No cervical adenopathy.   Skin:     General: Skin is warm and dry.      Coloration: Skin is not jaundiced or pale.      Findings: No rash.   Neurological:      Mental Status: He is alert and oriented to person, place, and time.      Motor: No tremor.      Gait: Gait is intact.   Psychiatric:         Mood and Affect: Mood and affect normal.         Behavior: Behavior normal. Behavior is cooperative.         Cognition and Memory: Cognition normal.     Pt's previous physical exam  reviewed and updated as indicated.    Recent EKG reviewed.    Immunization History   Administered Date(s) Administered   • Flu Vaccine Quad PF >36MO 10/10/2018, 11/06/2019   • Influenza, Unspecified 10/07/2020   • Pneumococcal Conjugate 13-Valent (PCV13) 08/15/2016   • Pneumococcal Polysaccharide (PPSV23) 08/31/2020     Assessment/Plan   Diagnoses and all orders for this visit:    1. Essential hypertension (Primary)  -     valsartan (Diovan) 40 MG tablet; Take 1 tablet by mouth Daily.  Dispense: 30 tablet; Refill: 5  -     Comprehensive Metabolic Panel    2. Mixed hyperlipidemia  -     Comprehensive Metabolic Panel  -     Cholesterol, Total    3. Chronic obstructive pulmonary disease, unspecified COPD type (CMS/HCC)    4. Tobacco use disorder    Other orders  -     atorvastatin (LIPITOR) 40 MG tablet; Take 1 tablet by mouth Every Night.  Dispense: 30 tablet; Refill: 5       HTN not at goal. Continue BB but add valsartan as above. He will be seeing Dr. Upton early next month.  HLP with good tolerance of statin.  COPD appears stable. F/u with pulmonology as scheduled.  Tobacco cessation advised.  Pt voiced understanding of health risks of cont'd tobacco use.    Routine f/u in 3 months, sooner as needed/instructed.  I will contact patient regarding test results and provide instructions regarding any necessary changes in plan of care.  Patient was encouraged to keep me informed of any acute changes, lack of improvement, or any new concerning symptoms.  Pt is aware of reasons to seek emergent care.  Patient voiced understanding of all instructions and denied further questions.

## 2021-05-26 PROBLEM — F17.200 TOBACCO USE DISORDER: Status: ACTIVE | Noted: 2021-05-26

## 2021-05-26 LAB
ALBUMIN SERPL-MCNC: 4.6 G/DL (ref 3.5–5.2)
ALBUMIN/GLOB SERPL: 2.4 G/DL
ALP SERPL-CCNC: 124 U/L (ref 39–117)
ALT SERPL-CCNC: 12 U/L (ref 1–41)
AST SERPL-CCNC: 12 U/L (ref 1–40)
BILIRUB SERPL-MCNC: 0.4 MG/DL (ref 0–1.2)
BUN SERPL-MCNC: 18 MG/DL (ref 6–20)
BUN/CREAT SERPL: 24.3 (ref 7–25)
CALCIUM SERPL-MCNC: 9.5 MG/DL (ref 8.6–10.5)
CHLORIDE SERPL-SCNC: 102 MMOL/L (ref 98–107)
CHOLEST SERPL-MCNC: 101 MG/DL (ref 0–200)
CO2 SERPL-SCNC: 26.2 MMOL/L (ref 22–29)
CREAT SERPL-MCNC: 0.74 MG/DL (ref 0.76–1.27)
GLOBULIN SER CALC-MCNC: 1.9 GM/DL
GLUCOSE SERPL-MCNC: 123 MG/DL (ref 65–99)
POTASSIUM SERPL-SCNC: 4 MMOL/L (ref 3.5–5.2)
PROT SERPL-MCNC: 6.5 G/DL (ref 6–8.5)
SODIUM SERPL-SCNC: 138 MMOL/L (ref 136–145)

## 2021-06-11 NOTE — TELEPHONE ENCOUNTER
Caller: CARLOS POLK    Relationship: Emergency Contact    Best call back number: [phone]    Medication needed:   Requested Prescriptions     Pending Prescriptions Disp Refills   • tiZANidine (ZANAFLEX) 4 MG tablet 90 tablet 0     Sig: Take 1 tablet by mouth Every 8 (Eight) Hours As Needed for Muscle Spasms.       When do you need the refill by: ASAP    What additional details did the patient provide when requesting the medication: WILL RUN OUT OVER THE WEEKEND    Does the patient have less than a 3 day supply:  [x] Yes  [] No    What is the patient's preferred pharmacy: [facility] ([place]) - [address], [address] - [phone]  - [phone] FX

## 2021-06-14 RX ORDER — TIZANIDINE 4 MG/1
4 TABLET ORAL EVERY 8 HOURS PRN
Qty: 90 TABLET | Refills: 0 | Status: SHIPPED | OUTPATIENT
Start: 2021-06-14 | End: 2021-08-26 | Stop reason: SDUPTHER

## 2021-07-14 ENCOUNTER — TELEPHONE (OUTPATIENT)
Dept: FAMILY MEDICINE CLINIC | Facility: CLINIC | Age: 61
End: 2021-07-14

## 2021-07-14 NOTE — TELEPHONE ENCOUNTER
PATIENT HAS A RASH ON HIS CHEST AND LEFT SIDE AND HE WOULD LIKE A CALL BACK TO TALK WITH HIS PCP ABOUT THIS.    CONTACT: [phone]

## 2021-07-14 NOTE — TELEPHONE ENCOUNTER
Called patient, reaction to heart monitor stickers, said that he couldn't come in until Friday, scheduled appt.

## 2021-07-16 ENCOUNTER — OFFICE VISIT (OUTPATIENT)
Dept: FAMILY MEDICINE CLINIC | Facility: CLINIC | Age: 61
End: 2021-07-16

## 2021-07-16 VITALS
HEIGHT: 69 IN | BODY MASS INDEX: 26.36 KG/M2 | DIASTOLIC BLOOD PRESSURE: 70 MMHG | OXYGEN SATURATION: 99 % | WEIGHT: 178 LBS | SYSTOLIC BLOOD PRESSURE: 122 MMHG | HEART RATE: 69 BPM

## 2021-07-16 DIAGNOSIS — L23.1 ALLERGIC CONTACT DERMATITIS DUE TO ADHESIVES: Primary | ICD-10-CM

## 2021-07-16 PROCEDURE — 96372 THER/PROPH/DIAG INJ SC/IM: CPT | Performed by: NURSE PRACTITIONER

## 2021-07-16 PROCEDURE — 99213 OFFICE O/P EST LOW 20 MIN: CPT | Performed by: NURSE PRACTITIONER

## 2021-07-16 RX ORDER — METHYLPREDNISOLONE ACETATE 80 MG/ML
80 INJECTION, SUSPENSION INTRA-ARTICULAR; INTRALESIONAL; INTRAMUSCULAR; SOFT TISSUE ONCE
Status: COMPLETED | OUTPATIENT
Start: 2021-07-16 | End: 2021-07-16

## 2021-07-16 RX ADMIN — METHYLPREDNISOLONE ACETATE 80 MG: 80 INJECTION, SUSPENSION INTRA-ARTICULAR; INTRALESIONAL; INTRAMUSCULAR; SOFT TISSUE at 10:56

## 2021-07-16 NOTE — PROGRESS NOTES
Subjective     Chief Complaint:    Chief Complaint   Patient presents with   • Rash     on his chest       History of Present Illness:   Notes rash for about 3 weeks. Started after he wore a heart monitor for about a month. He notes it is starting to improve some. His wife has been putting triple anbx ointment and putting alcohol wipes. Notes 2 sites have almost healed up but one remains red and itching. Was draining some clear fluid but not any longer.   No change in detergents, lotions, or soaps.     He notes that he has had neuro stimulator placed. He is still having a lot pain. He tried to go back to work but found it very difficult due to his pain and chronic conditions. He feels he is unable to work    Review of Systems  Gen- No fevers, chills  CV- No chest pain, palpitations  Resp- No cough, dyspnea  GI- No N/V/D, abd pain  Neuro-No dizziness, headaches      I have reviewed and/or updated the patient's past medical, surgical, family, social history and problem list as appropriate.     Medications:    Current Outpatient Medications:   •  acetaminophen (TYLENOL) 500 MG tablet, Take 500 mg by mouth Daily., Disp: , Rfl:   •  albuterol sulfate  (90 Base) MCG/ACT inhaler, Inhale 2 puffs Every 6 (Six) Hours As Needed for Wheezing or Shortness of Air., Disp: 17 g, Rfl: 5  •  atorvastatin (LIPITOR) 40 MG tablet, Take 1 tablet by mouth Every Night., Disp: 30 tablet, Rfl: 5  •  carvedilol (COREG) 12.5 MG tablet, Take 12.5 mg by mouth 2 (Two) Times a Day With Meals., Disp: , Rfl:   •  Cholecalciferol (vitamin D3) 125 MCG (5000 UT) capsule capsule, Take 1 capsule by mouth Daily., Disp: 30 capsule, Rfl: 5  •  diclofenac (VOLTAREN) 75 MG EC tablet, TAKE 1 TABLET BY MOUTH TWICE DAILY (Patient taking differently: Take 75 mg by mouth 2 (Two) Times a Day.), Disp: 60 tablet, Rfl: 4  •  gabapentin (NEURONTIN) 600 MG tablet, Take 600 mg by mouth 3 (Three) Times a Day., Disp: , Rfl:   •  HYDROcodone-acetaminophen  "(NORCO) 5-325 MG per tablet, Take 1 tablet by mouth 2 (Two) Times a Day As Needed for Severe Pain ., Disp: , Rfl:   •  NARCAN 4 MG/0.1ML nasal spray, 1 spray into the nostril(s) as directed by provider As Needed., Disp: , Rfl:   •  O2 (OXYGEN), Inhale 2 L/min 1 (One) Time. At night, Disp: , Rfl:   •  oxyCODONE-acetaminophen (Percocet) 5-325 MG per tablet, Every 12 (Twelve) Hours., Disp: , Rfl:   •  pantoprazole (PROTONIX) 40 MG EC tablet, Take 40 mg by mouth Daily., Disp: , Rfl:   •  rOPINIRole (REQUIP) 0.25 MG tablet, Take 1 tablet by mouth 3 (Three) Times a Day. Take 1 hour before bedtime., Disp: 90 tablet, Rfl: 5  •  tiotropium bromide monohydrate (Spiriva Respimat) 2.5 MCG/ACT aerosol solution inhaler, Inhale 2 puffs Daily., Disp: 90 each, Rfl: 3  •  tiZANidine (ZANAFLEX) 4 MG tablet, Take 1 tablet by mouth Every 8 (Eight) Hours As Needed for Muscle Spasms., Disp: 90 tablet, Rfl: 0  •  valsartan (Diovan) 40 MG tablet, Take 1 tablet by mouth Daily., Disp: 30 tablet, Rfl: 5  •  triamcinolone (KENALOG) 0.1 % ointment, Apply  topically to the appropriate area as directed 2 (Two) Times a Day., Disp: 30 g, Rfl: 0    Current Facility-Administered Medications:   •  methylPREDNISolone acetate (DEPO-medrol) injection 80 mg, 80 mg, Intramuscular, Once, Karuna Olson APRN    Allergies:  No Known Allergies    Objective     Vital Signs:   Vitals:    07/16/21 1038   BP: 122/70   Pulse: 69   SpO2: 99%   Weight: 80.7 kg (178 lb)   Height: 175.3 cm (69\")   PainSc: 0-No pain       Physical Exam:    Physical Exam  Vitals and nursing note reviewed.   Constitutional:       Appearance: Normal appearance. He is well-developed.   HENT:      Head: Normocephalic and atraumatic.   Eyes:      Pupils: Pupils are equal, round, and reactive to light.   Cardiovascular:      Rate and Rhythm: Normal rate and regular rhythm.      Heart sounds: Normal heart sounds.   Pulmonary:      Effort: Pulmonary effort is normal.      Breath sounds: " Normal breath sounds.   Abdominal:      General: Bowel sounds are normal. There is no distension.      Palpations: Abdomen is soft.      Tenderness: There is no abdominal tenderness.   Musculoskeletal:      Cervical back: Neck supple.   Skin:     General: Skin is warm and dry.      Capillary Refill: Capillary refill takes less than 2 seconds.             Comments: Several scattered areas of raised red rash noted on sites above, no drainage   Neurological:      General: No focal deficit present.      Mental Status: He is alert and oriented to person, place, and time.   Psychiatric:         Mood and Affect: Mood normal.         Behavior: Behavior normal.         Body mass index is 26.29 kg/m².    Assessment / Plan     Assessment/Plan:   Problem List Items Addressed This Visit     None      Visit Diagnoses     Allergic contact dermatitis due to adhesives    -  Primary    Relevant Medications    methylPREDNISolone acetate (DEPO-medrol) injection 80 mg    triamcinolone (KENALOG) 0.1 % ointment        -- stop neosporin and alcohol wipes. Looks like allergic reaction from adhesive. Depo medrol given in office. Triamcinolone topically. IF no improvement into next week can call back and will send anbx  -- discussed I do not do state disability paperwork. Encouraged him to file through social security office    Follow up:  As needed    Electronically signed by AN Yang   07/16/2021 10:45 EDT      Please note that portions of this note may have been completed with a voice recognition program. Efforts were made to edit the dictations, but occasionally words are mistranscribed.

## 2021-08-26 ENCOUNTER — OFFICE VISIT (OUTPATIENT)
Dept: FAMILY MEDICINE CLINIC | Facility: CLINIC | Age: 61
End: 2021-08-26

## 2021-08-26 VITALS
BODY MASS INDEX: 25.36 KG/M2 | HEART RATE: 76 BPM | HEIGHT: 69 IN | OXYGEN SATURATION: 95 % | WEIGHT: 171.25 LBS | SYSTOLIC BLOOD PRESSURE: 130 MMHG | DIASTOLIC BLOOD PRESSURE: 76 MMHG

## 2021-08-26 DIAGNOSIS — J44.9 CHRONIC OBSTRUCTIVE PULMONARY DISEASE, UNSPECIFIED COPD TYPE (HCC): ICD-10-CM

## 2021-08-26 DIAGNOSIS — I10 ESSENTIAL HYPERTENSION: Primary | ICD-10-CM

## 2021-08-26 DIAGNOSIS — E78.2 MIXED HYPERLIPIDEMIA: ICD-10-CM

## 2021-08-26 DIAGNOSIS — Z99.81 DEPENDENCE ON NOCTURNAL OXYGEN THERAPY: ICD-10-CM

## 2021-08-26 PROCEDURE — 99214 OFFICE O/P EST MOD 30 MIN: CPT | Performed by: FAMILY MEDICINE

## 2021-08-26 RX ORDER — TIZANIDINE 4 MG/1
4 TABLET ORAL EVERY 8 HOURS PRN
Qty: 90 TABLET | Refills: 3 | Status: SHIPPED | OUTPATIENT
Start: 2021-08-26

## 2021-08-26 NOTE — PROGRESS NOTES
Subjective   Gaston Bagley is a 60 y.o. male.     History of Present Illness   Mr. Bagley presents today for routine f/u on several chronic med problems.     Hypertension currently treated with carvedilol, valsartan. On atorvastatin for dyslipidemia. Seen by Dr. Upton in June.     BP was running high 3 weeks ago, when standing getting dizzy, occ when lying in bed and turning head. No CP.     Followed by pulmonology for COPD with nocturnal supplemental oxygen dependency. Continues to smoke. Using his Incruse Ellipta, Symbicort and albuterol as needed. No recent increase in cough, wheeze.     Recently seen at [facility] ER for laceration to his thumb. Has had stitches subsequently removed with good healing. Tetanus updated at that time.    Otherwise he denies new concerns.    Pt's previous HPI reviewed and updated as indicated.     The following portions of the patient's history were reviewed and updated as appropriate: allergies, current medications, past family history, past medical history, past social history, past surgical history and problem list.    Review of Systems   Constitutional: Positive for fatigue. Negative for fever.   HENT: Negative for congestion, ear pain, hearing loss, nosebleeds, rhinorrhea, sneezing, sore throat, tinnitus and trouble swallowing.    Eyes: Negative for pain, discharge, redness and visual disturbance.   Respiratory: Positive for cough, shortness of breath and wheezing. Negative for chest tightness.    Cardiovascular: Positive for palpitations. Negative for chest pain and leg swelling.   Gastrointestinal: Negative for abdominal pain, blood in stool, constipation, diarrhea, nausea and vomiting.   Endocrine: Negative for cold intolerance, heat intolerance, polydipsia and polyuria.        Night sweats   Genitourinary: Negative for dysuria, flank pain, frequency, hematuria and urgency.   Musculoskeletal: Positive for arthralgias, back pain and myalgias. Negative for joint swelling  and neck pain.   Skin: Negative for rash and wound.   Neurological: Negative for dizziness, tremors, seizures, syncope, speech difficulty, weakness, numbness and headaches.   Hematological: Negative for adenopathy. Does not bruise/bleed easily.   Psychiatric/Behavioral: Negative for confusion, dysphoric mood and sleep disturbance. The patient is not nervous/anxious.    Pt's previous ROS reviewed and updated as indicated.       Objective    Vitals:    08/26/21 0912   BP: 130/76   Pulse: 76   SpO2: 95%     Body mass index is 25.28 kg/m².      08/26/21  0912   Weight: 77.7 kg (171 lb 4 oz)       Physical Exam  Vitals and nursing note reviewed.   Constitutional:       General: He is not in acute distress.     Appearance: He is well-developed, well-groomed and normal weight. He is not ill-appearing.      Comments: Appears older than stated age   HENT:      Head: Normocephalic and atraumatic.   Eyes:      General: No scleral icterus.     Extraocular Movements: Extraocular movements intact.      Conjunctiva/sclera: Conjunctivae normal.   Neck:      Thyroid: No thyroid mass.      Vascular: Normal carotid pulses. No carotid bruit.   Cardiovascular:      Rate and Rhythm: Normal rate and regular rhythm.      Pulses: Decreased pulses (pedal).      Heart sounds: Heart sounds are distant.   Pulmonary:      Effort: Pulmonary effort is normal.      Breath sounds: Decreased breath sounds present. No wheezing, rhonchi or rales.   Musculoskeletal:      Right lower leg: No edema.      Left lower leg: No edema.   Lymphadenopathy:      Cervical: No cervical adenopathy.   Skin:     General: Skin is warm and dry.      Coloration: Skin is not jaundiced or pale.      Findings: No rash.   Neurological:      Mental Status: He is alert and oriented to person, place, and time.      Motor: No tremor.      Gait: Gait is intact.   Psychiatric:         Mood and Affect: Mood and affect normal.         Behavior: Behavior normal. Behavior is  cooperative.         Cognition and Memory: Cognition normal.     Pt's previous physical exam reviewed and updated as indicated.    Lab Results   Component Value Date    WBC 7.61 02/26/2021    HGB 15.1 02/26/2021    HCT 44.7 02/26/2021    MCV 87.5 02/26/2021     02/26/2021       Lab Results   Component Value Date    BUN 18 05/25/2021    CREATININE 0.74 (L) 05/25/2021    EGFRIFNONA 109 05/25/2021    EGFRIFAFRI 132 05/25/2021    BCR 24.3 05/25/2021    K 4.0 05/25/2021    CO2 26.2 05/25/2021    CALCIUM 9.5 05/25/2021    PROTENTOTREF 6.5 05/25/2021    ALBUMIN 4.60 05/25/2021    LABIL2 2.4 05/25/2021    AST 12 05/25/2021    ALT 12 05/25/2021       Lab Results   Component Value Date    CHLPL 101 05/25/2021    TRIG 57 08/31/2020    HDL 38 (L) 08/31/2020    LDL 48 08/31/2020       Lab Results   Component Value Date    HGBA1C 5.60 08/31/2020       Lab Results   Component Value Date    TSH 1.260 08/31/2020           Assessment/Plan   Diagnoses and all orders for this visit:    1. Essential hypertension (Primary)    2. Mixed hyperlipidemia    3. Chronic obstructive pulmonary disease, unspecified COPD type (CMS/[facility])    4. Dependence on nocturnal oxygen therapy    Other orders  -     tiZANidine (ZANAFLEX) 4 MG tablet; Take 1 tablet by mouth Every 8 (Eight) Hours As Needed for Muscle Spasms.  Dispense: 90 tablet; Refill: 3       Hypertension controlled on Coreg, losartan. Continue current treatment.  Hyperlipidemia with good tolerance of atorvastatin. Continue current treatment.  COPD stable, continue nocturnal oxygen. Continue Incruse Ellipta, Symbicort, albuterol as needed. Tobacco cessation advised.  Pt voiced understanding of health risks of cont'd tobacco use.  Routine follow-up in 3 months, sooner as needed.  Patient was encouraged to keep me informed of any acute changes or any new concerning symptoms.  Pt is aware of reasons to seek emergent care.  Patient voiced understanding of all instructions and denied further  questions.    Please note that portions of this note may have been completed with a voice recognition program. Efforts were made to edit the dictations, but occasionally words are mistranscribed.

## 2021-08-27 ENCOUNTER — OFFICE VISIT (OUTPATIENT)
Dept: PULMONOLOGY | Facility: CLINIC | Age: 61
End: 2021-08-27

## 2021-08-27 VITALS
WEIGHT: 171 LBS | HEIGHT: 69 IN | RESPIRATION RATE: 18 BRPM | DIASTOLIC BLOOD PRESSURE: 70 MMHG | SYSTOLIC BLOOD PRESSURE: 136 MMHG | OXYGEN SATURATION: 96 % | BODY MASS INDEX: 25.33 KG/M2 | HEART RATE: 78 BPM

## 2021-08-27 DIAGNOSIS — R61 NIGHT SWEAT: ICD-10-CM

## 2021-08-27 DIAGNOSIS — R06.09 DYSPNEA ON EXERTION: ICD-10-CM

## 2021-08-27 DIAGNOSIS — J43.2 CENTRILOBULAR EMPHYSEMA (HCC): Primary | ICD-10-CM

## 2021-08-27 DIAGNOSIS — J44.9 MODERATE COPD (CHRONIC OBSTRUCTIVE PULMONARY DISEASE) (HCC): ICD-10-CM

## 2021-08-27 DIAGNOSIS — Z87.891 PERSONAL HISTORY OF NICOTINE DEPENDENCE: ICD-10-CM

## 2021-08-27 PROCEDURE — 99214 OFFICE O/P EST MOD 30 MIN: CPT | Performed by: INTERNAL MEDICINE

## 2021-08-27 RX ORDER — BUDESONIDE AND FORMOTEROL FUMARATE DIHYDRATE 160; 4.5 UG/1; UG/1
2 AEROSOL RESPIRATORY (INHALATION) 2 TIMES DAILY
Qty: 1 EACH | Refills: 5 | Status: SHIPPED | OUTPATIENT
Start: 2021-08-27 | End: 2022-02-25 | Stop reason: SDUPTHER

## 2021-08-27 RX ORDER — ALBUTEROL SULFATE 90 UG/1
2 INHALANT RESPIRATORY (INHALATION) EVERY 4 HOURS PRN
Qty: 1 EACH | Refills: 5 | Status: SHIPPED | OUTPATIENT
Start: 2021-08-27 | End: 2022-02-25 | Stop reason: SDUPTHER

## 2021-08-27 NOTE — PROGRESS NOTES
Pulmonary follow-up office Visit      Chief Complaint:    Chief Complaint   Patient presents with   • Follow-up   • Breathing Problem       History of Present Illness: Gaston Bagley is a 60 y.o. male who is here today to follow up for moderate COPD.    Duration: COPD diagnosed in about 2018  Severity: moderate dyspnea  Associated Symptoms: chronic cough with white mucus that is at baseline, no current wheezing, no hemoptysis  Exercise Tolerance: able to work as  for 30-60 min at a time  Timing: daily  Exacerbating Factors: moderate exertion, weather changes  Relieving Factors: rest and inhalers    Current Regimen: Symbicort 160 bid and prn albuterol      Subjective      Review of Systems:   Review of Systems   Constitutional: Negative for chills, fatigue, fever, unexpected weight gain and unexpected weight loss.   HENT: Negative for postnasal drip, rhinorrhea, sinus pressure, trouble swallowing and voice change.    Eyes: Negative for discharge and itching.   Respiratory: Positive for cough and shortness of breath. Negative for wheezing.    Cardiovascular: Negative for chest pain, palpitations and leg swelling.   Gastrointestinal: Negative for abdominal distention, constipation, diarrhea and GERD.   Endocrine: Negative for cold intolerance and heat intolerance.   Genitourinary: Negative for dysuria and hematuria.   Musculoskeletal: Negative for arthralgias and myalgias.   Skin: Negative for color change and rash.   Allergic/Immunologic: Negative for environmental allergies and food allergies.   Neurological: Negative for dizziness, speech difficulty, light-headedness and memory problem.   Hematological: Negative for adenopathy. Does not bruise/bleed easily.   Psychiatric/Behavioral: Negative for sleep disturbance and depressed mood. The patient is not nervous/anxious.        Medications:     Current Outpatient Medications:   •  acetaminophen (TYLENOL) 500 MG tablet, Take 500 mg by mouth Daily., Disp: ,  Rfl:   •  atorvastatin (LIPITOR) 40 MG tablet, Take 1 tablet by mouth Every Night., Disp: 30 tablet, Rfl: 5  •  carvedilol (COREG) 12.5 MG tablet, Take 12.5 mg by mouth 2 (Two) Times a Day With Meals., Disp: , Rfl:   •  Cholecalciferol (vitamin D3) 125 MCG (5000 UT) capsule capsule, Take 1 capsule by mouth Daily., Disp: 30 capsule, Rfl: 5  •  diclofenac (VOLTAREN) 75 MG EC tablet, TAKE 1 TABLET BY MOUTH TWICE DAILY (Patient taking differently: Take 75 mg by mouth 2 (Two) Times a Day.), Disp: 60 tablet, Rfl: 4  •  gabapentin (NEURONTIN) 600 MG tablet, Take 600 mg by mouth 3 (Three) Times a Day., Disp: , Rfl:   •  HYDROcodone-acetaminophen (NORCO) 5-325 MG per tablet, Take 1 tablet by mouth 2 (Two) Times a Day As Needed for Severe Pain ., Disp: , Rfl:   •  NARCAN 4 MG/0.1ML nasal spray, 1 spray into the nostril(s) as directed by provider As Needed., Disp: , Rfl:   •  O2 (OXYGEN), Inhale 2 L/min 1 (One) Time. At night, Disp: , Rfl:   •  pantoprazole (PROTONIX) 40 MG EC tablet, Take 40 mg by mouth Daily., Disp: , Rfl:   •  rOPINIRole (REQUIP) 0.25 MG tablet, Take 1 tablet by mouth 3 (Three) Times a Day. Take 1 hour before bedtime., Disp: 90 tablet, Rfl: 5  •  tiZANidine (ZANAFLEX) 4 MG tablet, Take 1 tablet by mouth Every 8 (Eight) Hours As Needed for Muscle Spasms., Disp: 90 tablet, Rfl: 3  •  triamcinolone (KENALOG) 0.1 % ointment, Apply  topically to the appropriate area as directed 2 (Two) Times a Day., Disp: 30 g, Rfl: 0  •  valsartan (Diovan) 40 MG tablet, Take 1 tablet by mouth Daily., Disp: 30 tablet, Rfl: 5  •  Albuterol Sulfate, sensor, (ProAir Digihaler) 108 (90 Base) MCG/ACT aerosol powder , Inhale 2 puffs Every 4 (Four) Hours As Needed (shortness of breath)., Disp: 1 each, Rfl: 5  •  budesonide-formoterol (SYMBICORT) 160-4.5 MCG/ACT inhaler, Inhale 2 puffs 2 (Two) Times a Day., Disp: 1 each, Rfl: 5  •  Umeclidinium Bromide (Incruse Ellipta) 62.5 MCG/INH aerosol powder , Inhale 1 puff Daily for 30 days.,  "Disp: 1 each, Rfl: 5    Allergies:   No Known Allergies    Objective     Physical Exam:  Vital Signs:   Vitals:    08/27/21 0954   BP: 136/70   Pulse: 78   Resp: 18   SpO2: 96%   Weight: 77.6 kg (171 lb)   Height: 175.3 cm (69.02\")       Physical Exam  Vitals and nursing note reviewed.   Constitutional:       General: He is not in acute distress.     Appearance: He is well-developed. He is not ill-appearing.      Comments: Appears older than stated age   HENT:      Head: Normocephalic and atraumatic.      Right Ear: External ear normal.      Left Ear: External ear normal.      Nose: No congestion or rhinorrhea.      Mouth/Throat:      Mouth: Mucous membranes are moist.      Pharynx: Oropharynx is clear. No oropharyngeal exudate.   Eyes:      General: No scleral icterus.        Right eye: No discharge.         Left eye: No discharge.      Extraocular Movements: Extraocular movements intact.   Neck:      Trachea: No tracheal deviation.   Cardiovascular:      Rate and Rhythm: Normal rate and regular rhythm.   Pulmonary:      Effort: No accessory muscle usage or respiratory distress.      Breath sounds: Wheezing present.      Comments: Decreased breath sounds throughout with expiratory wheeze  Abdominal:      General: There is no distension.      Palpations: Abdomen is soft.   Musculoskeletal:         General: Normal range of motion.      Cervical back: Normal range of motion and neck supple.      Right lower leg: No edema.      Left lower leg: No edema.   Skin:     General: Skin is warm and dry.      Coloration: Skin is not jaundiced.      Findings: No rash.   Neurological:      General: No focal deficit present.      Mental Status: He is alert and oriented to person, place, and time.      Motor: No weakness.      Gait: Gait normal.   Psychiatric:         Mood and Affect: Mood normal.         Behavior: Behavior normal.         Thought Content: Thought content normal.         Judgment: Judgment normal.         Results " Review:   Labs: Reviewed.  Feb 2021  Fungal serologies negative  A1AT 173    Lab Results   Component Value Date    WBC 7.61 02/26/2021    HGB 15.1 02/26/2021    HCT 44.7 02/26/2021    MCV 87.5 02/26/2021     02/26/2021     Lab Results   Component Value Date    BUN 18 05/25/2021    CREATININE 0.74 (L) 05/25/2021    EGFRIFNONA 109 05/25/2021    EGFRIFAFRI 132 05/25/2021    BCR 24.3 05/25/2021    K 4.0 05/25/2021    CO2 26.2 05/25/2021    CALCIUM 9.5 05/25/2021    PROTENTOTREF 6.5 05/25/2021    ALBUMIN 4.60 05/25/2021    LABIL2 2.4 05/25/2021    AST 12 05/25/2021    ALT 12 05/25/2021 2021 alpha-1 antitrypsin level 169    Micro: As of August 27, 2021   No results found for: RESPCX  No results found for: BLOODCX  No results found for: URINECX  No results found for: MRSACX  No results found for: MRSAPCR  No results found for: URCX  No components found for: LOWRESPCF  No results found for: THROATCX  No results found for: CULTURES  No components found for: STREPBCX  No results found for: STREPPNEUAG  No results found for: LEGIONELLA  No results found for: MYCOPLASCX  No results found for: GCCX  No results found for: WOUNDCX  No results found for: BODYFLDCX    ABG: No results found for: PHART, DKD9AUH, PO2ART, HGBBG, G8ZFCBDX, CFIO2, FCOHB, CARBOXYHGB, FMETHB    Radiology Scans:   Nov 2020 Ct chest with stable 5mm nodules, no lymphadenopathy      PFT IMPRESSION:    February 2021 PFT showed FEV1 68%, FEV1/FVC ratio 56.  Significant bronchodilator responsiveness.  Air-trapping noted.  No hyperinflation.  Decreased diffusing capacity.    Assessment / Plan      Assessment/Plan:    1. Centrilobular emphysema (CMS/HCC)  Trelegy not covered.  Add INcruse to Symbicort for triple therapy.  Discussed risk, benefits and possible side effects.     - Umeclidinium Bromide (Incruse Ellipta) 62.5 MCG/INH aerosol powder ; Inhale 1 puff Daily for 30 days.  Dispense: 1 each; Refill: 5  - Albuterol Sulfate, sensor, (ProAir  Digihaler) 108 (90 Base) MCG/ACT aerosol powder ; Inhale 2 puffs Every 4 (Four) Hours As Needed (shortness of breath).  Dispense: 1 each; Refill: 5  - budesonide-formoterol (SYMBICORT) 160-4.5 MCG/ACT inhaler; Inhale 2 puffs 2 (Two) Times a Day.  Dispense: 1 each; Refill: 5    2. Moderate COPD (chronic obstructive pulmonary disease) (CMS/HCC)  Add Incruse    3. Dyspnea on exertion  Related to COPD and emphysema. Adjust inhalers    4. Night sweat  Negative fungal serologies and negative quantiferon    5. Personal history of nicotine dependence  Due for lung cancer screening  -  CT Chest Low Dose Cancer Screening WO; Future      Follow Up:   Return in about 6 months (around 2/27/2022).    Gaye James MD  Pulmonary/Critical Care Physician   [place]      Please note that portions of this note may have been completed with a voice recognition program. Efforts were made to edit the dictations, but occasionally words are mistranscribed.

## 2021-09-08 ENCOUNTER — TELEPHONE (OUTPATIENT)
Dept: FAMILY MEDICINE CLINIC | Facility: CLINIC | Age: 61
End: 2021-09-08

## 2021-09-08 NOTE — TELEPHONE ENCOUNTER
Caller: CARLOS POLK    Relationship to patient: Emergency Contact    Best call back number: [phone]    Chief complaint: SHORTNESS OF BREATH, TIGHTNESS IN CHEST, CHILLS, VOMITING GREEN MUCOUS    Patient directed to call 911 or go to their nearest emergency room.     Patient verbalized understanding: [x] Yes  [] No  If no, why?    Additional notes:WIFE CARLOS CALLED STATING PATIENT WANTED AN APPOINTMENT HOWEVER PATIENT IS STILL AT WORK. WIFE TRIED TO GET PATIENT TO GO TO ED LAST NIGHT AND PATIENT REFUSED. I ADVISED WIFE THAT HE NEEDS TO GO TO ED TO GET A HIGHER LEVEL OF CARE AND WIFE AGREED.

## 2021-09-21 RX ORDER — DICLOFENAC SODIUM 75 MG/1
TABLET, DELAYED RELEASE ORAL
Qty: 60 TABLET | Refills: 3 | Status: SHIPPED | OUTPATIENT
Start: 2021-09-21 | End: 2022-01-31

## 2021-09-22 RX ORDER — ROPINIROLE 0.25 MG/1
TABLET, FILM COATED ORAL
Qty: 90 TABLET | Refills: 4 | Status: SHIPPED | OUTPATIENT
Start: 2021-09-22 | End: 2022-03-07

## 2021-09-29 ENCOUNTER — HOSPITAL ENCOUNTER (OUTPATIENT)
Dept: CT IMAGING | Facility: HOSPITAL | Age: 61
Discharge: HOME | End: 2021-09-29
Admitting: INTERNAL MEDICINE

## 2021-09-29 DIAGNOSIS — Z87.891 PERSONAL HISTORY OF NICOTINE DEPENDENCE: ICD-10-CM

## 2021-09-29 PROCEDURE — 71271 CT THORAX LUNG CANCER SCR C-: CPT

## 2021-11-23 RX ORDER — ATORVASTATIN CALCIUM 40 MG/1
TABLET, FILM COATED ORAL
Qty: 30 TABLET | Refills: 4 | Status: SHIPPED | OUTPATIENT
Start: 2021-11-23 | End: 2022-08-08 | Stop reason: SDUPTHER

## 2021-11-29 ENCOUNTER — OFFICE VISIT (OUTPATIENT)
Dept: FAMILY MEDICINE CLINIC | Facility: CLINIC | Age: 61
End: 2021-11-29

## 2021-11-29 VITALS
HEART RATE: 74 BPM | BODY MASS INDEX: 24.9 KG/M2 | OXYGEN SATURATION: 98 % | HEIGHT: 69 IN | TEMPERATURE: 98.1 F | WEIGHT: 168.13 LBS | SYSTOLIC BLOOD PRESSURE: 130 MMHG | DIASTOLIC BLOOD PRESSURE: 76 MMHG

## 2021-11-29 DIAGNOSIS — G25.81 RLS (RESTLESS LEGS SYNDROME): ICD-10-CM

## 2021-11-29 DIAGNOSIS — F17.200 TOBACCO USE DISORDER: ICD-10-CM

## 2021-11-29 DIAGNOSIS — M79.671 BILATERAL FOOT PAIN: ICD-10-CM

## 2021-11-29 DIAGNOSIS — E04.2 MULTIPLE THYROID NODULES: ICD-10-CM

## 2021-11-29 DIAGNOSIS — R53.1 GENERAL WEAKNESS: ICD-10-CM

## 2021-11-29 DIAGNOSIS — Z99.81 DEPENDENCE ON NOCTURNAL OXYGEN THERAPY: ICD-10-CM

## 2021-11-29 DIAGNOSIS — Z11.59 NEED FOR HEPATITIS C SCREENING TEST: ICD-10-CM

## 2021-11-29 DIAGNOSIS — J44.9 CHRONIC OBSTRUCTIVE PULMONARY DISEASE, UNSPECIFIED COPD TYPE (HCC): ICD-10-CM

## 2021-11-29 DIAGNOSIS — I10 ESSENTIAL HYPERTENSION: ICD-10-CM

## 2021-11-29 DIAGNOSIS — E78.2 MIXED HYPERLIPIDEMIA: ICD-10-CM

## 2021-11-29 DIAGNOSIS — Z13.1 SCREENING FOR DIABETES MELLITUS: ICD-10-CM

## 2021-11-29 DIAGNOSIS — R61 CHRONIC NIGHT SWEATS: ICD-10-CM

## 2021-11-29 DIAGNOSIS — M79.672 BILATERAL FOOT PAIN: ICD-10-CM

## 2021-11-29 DIAGNOSIS — R76.8 POSITIVE ANA (ANTINUCLEAR ANTIBODY): ICD-10-CM

## 2021-11-29 DIAGNOSIS — E53.8 VITAMIN B 12 DEFICIENCY: ICD-10-CM

## 2021-11-29 DIAGNOSIS — I27.20 PULMONARY HYPERTENSION (HCC): ICD-10-CM

## 2021-11-29 DIAGNOSIS — Z00.00 WELL ADULT EXAM: Primary | ICD-10-CM

## 2021-11-29 DIAGNOSIS — I47.1 PAROXYSMAL SVT (SUPRAVENTRICULAR TACHYCARDIA) (HCC): ICD-10-CM

## 2021-11-29 DIAGNOSIS — R61 NIGHT SWEATS: ICD-10-CM

## 2021-11-29 DIAGNOSIS — R06.09 DYSPNEA ON EXERTION: ICD-10-CM

## 2021-11-29 DIAGNOSIS — E55.9 VITAMIN D DEFICIENCY: ICD-10-CM

## 2021-11-29 PROCEDURE — 99214 OFFICE O/P EST MOD 30 MIN: CPT | Performed by: FAMILY MEDICINE

## 2021-11-29 PROCEDURE — 99396 PREV VISIT EST AGE 40-64: CPT | Performed by: FAMILY MEDICINE

## 2021-11-30 LAB
25(OH)D3+25(OH)D2 SERPL-MCNC: 18.7 NG/ML (ref 30–100)
ALBUMIN SERPL-MCNC: 4.4 G/DL (ref 3.5–5.2)
ALBUMIN/GLOB SERPL: 2.3 G/DL
ALP SERPL-CCNC: 104 U/L (ref 39–117)
ALT SERPL-CCNC: 11 U/L (ref 1–41)
ANA SER QL: NEGATIVE
AST SERPL-CCNC: 11 U/L (ref 1–40)
BASOPHILS # BLD AUTO: 0.05 10*3/MM3 (ref 0–0.2)
BASOPHILS NFR BLD AUTO: 0.8 % (ref 0–1.5)
BILIRUB SERPL-MCNC: 0.6 MG/DL (ref 0–1.2)
BUN SERPL-MCNC: 21 MG/DL (ref 6–20)
BUN/CREAT SERPL: 33.3 (ref 7–25)
CALCIUM SERPL-MCNC: 9 MG/DL (ref 8.6–10.5)
CHLORIDE SERPL-SCNC: 105 MMOL/L (ref 98–107)
CHOLEST SERPL-MCNC: 89 MG/DL (ref 0–200)
CK SERPL-CCNC: 45 U/L (ref 20–200)
CO2 SERPL-SCNC: 28 MMOL/L (ref 22–29)
CREAT SERPL-MCNC: 0.63 MG/DL (ref 0.76–1.27)
EOSINOPHIL # BLD AUTO: 0.09 10*3/MM3 (ref 0–0.4)
EOSINOPHIL NFR BLD AUTO: 1.4 % (ref 0.3–6.2)
ERYTHROCYTE [DISTWIDTH] IN BLOOD BY AUTOMATED COUNT: 12.6 % (ref 12.3–15.4)
ERYTHROCYTE [SEDIMENTATION RATE] IN BLOOD BY WESTERGREN METHOD: 3 MM/HR (ref 0–20)
GLOBULIN SER CALC-MCNC: 1.9 GM/DL
GLUCOSE SERPL-MCNC: 79 MG/DL (ref 65–99)
HBA1C MFR BLD: 5.7 % (ref 4.8–5.6)
HCT VFR BLD AUTO: 42.4 % (ref 37.5–51)
HCV AB S/CO SERPL IA: <0.1 S/CO RATIO (ref 0–0.9)
HDLC SERPL-MCNC: 37 MG/DL (ref 40–60)
HGB BLD-MCNC: 14.4 G/DL (ref 13–17.7)
IMM GRANULOCYTES # BLD AUTO: 0.02 10*3/MM3 (ref 0–0.05)
IMM GRANULOCYTES NFR BLD AUTO: 0.3 % (ref 0–0.5)
IRON SERPL-MCNC: 110 MCG/DL (ref 59–158)
LDLC SERPL CALC-MCNC: 36 MG/DL (ref 0–100)
LYMPHOCYTES # BLD AUTO: 1.63 10*3/MM3 (ref 0.7–3.1)
LYMPHOCYTES NFR BLD AUTO: 24.8 % (ref 19.6–45.3)
MCH RBC QN AUTO: 29.6 PG (ref 26.6–33)
MCHC RBC AUTO-ENTMCNC: 34 G/DL (ref 31.5–35.7)
MCV RBC AUTO: 87.1 FL (ref 79–97)
MONOCYTES # BLD AUTO: 0.59 10*3/MM3 (ref 0.1–0.9)
MONOCYTES NFR BLD AUTO: 9 % (ref 5–12)
NEUTROPHILS # BLD AUTO: 4.19 10*3/MM3 (ref 1.7–7)
NEUTROPHILS NFR BLD AUTO: 63.7 % (ref 42.7–76)
NRBC BLD AUTO-RTO: 0 /100 WBC (ref 0–0.2)
NT-PROBNP SERPL-MCNC: 68 PG/ML (ref 0–210)
PLATELET # BLD AUTO: 236 10*3/MM3 (ref 140–450)
POTASSIUM SERPL-SCNC: 4.3 MMOL/L (ref 3.5–5.2)
PROT SERPL-MCNC: 6.3 G/DL (ref 6–8.5)
RBC # BLD AUTO: 4.87 10*6/MM3 (ref 4.14–5.8)
SODIUM SERPL-SCNC: 140 MMOL/L (ref 136–145)
TRIGL SERPL-MCNC: 76 MG/DL (ref 0–150)
TSH SERPL DL<=0.005 MIU/L-ACNC: 0.84 UIU/ML (ref 0.27–4.2)
VIT B12 SERPL-MCNC: 310 PG/ML (ref 211–946)
VLDLC SERPL CALC-MCNC: 16 MG/DL (ref 5–40)
WBC # BLD AUTO: 6.57 10*3/MM3 (ref 3.4–10.8)

## 2021-12-06 ENCOUNTER — TELEPHONE (OUTPATIENT)
Dept: FAMILY MEDICINE CLINIC | Facility: CLINIC | Age: 61
End: 2021-12-06

## 2021-12-06 NOTE — TELEPHONE ENCOUNTER
Patients wife, Felicity, called and said patients mentioned having night sweats at his last appt, and they are getting worse.  She would like to know what can be done/what the next step would be. Please advise.

## 2021-12-16 NOTE — PROGRESS NOTES
Please contact pt's oxygen company and see if they advise he wear a mask rather than NC at night as he is having desats despite wearing oxygen.

## 2021-12-20 DIAGNOSIS — G47.34 NOCTURNAL HYPOXIA: Primary | ICD-10-CM

## 2021-12-20 DIAGNOSIS — J44.9 CHRONIC OBSTRUCTIVE PULMONARY DISEASE, UNSPECIFIED COPD TYPE (HCC): ICD-10-CM

## 2022-01-24 ENCOUNTER — TELEPHONE (OUTPATIENT)
Dept: FAMILY MEDICINE CLINIC | Facility: CLINIC | Age: 62
End: 2022-01-24

## 2022-01-24 RX ORDER — AZITHROMYCIN 250 MG/1
TABLET, FILM COATED ORAL
Qty: 6 TABLET | Refills: 0 | Status: SHIPPED | OUTPATIENT
Start: 2022-01-24 | End: 2022-03-01

## 2022-01-24 RX ORDER — DEXTROMETHORPHAN HYDROBROMIDE AND PROMETHAZINE HYDROCHLORIDE 15; 6.25 MG/5ML; MG/5ML
5 SYRUP ORAL 4 TIMES DAILY PRN
Qty: 118 ML | Refills: 0 | Status: SHIPPED | OUTPATIENT
Start: 2022-01-24 | End: 2022-02-28

## 2022-01-24 RX ORDER — DEXAMETHASONE 4 MG/1
4 TABLET ORAL
Qty: 5 TABLET | Refills: 0 | Status: SHIPPED | OUTPATIENT
Start: 2022-01-24 | End: 2022-01-29

## 2022-01-24 NOTE — TELEPHONE ENCOUNTER
Caller: Gaston Bagley    Relationship to patient: Self    Best call back number: [phone]     Date of exposure: UNSURE     Date of positive COVID19 test: 01/19/2022    Date if possible COVID19 exposure: UNSURE    COVID19 symptoms: COUGH, SHORTNESS OF AIR, COUGHING UP MUCUS, HEADACHE    Date of initial quarantine: 01/19/2022    Additional information or concerns: THE PATIENT IS REQUESTING A CALL BACK TO DISCUSS WHAT TREATMENT DR YBARRA MIGHT WANT FOR HIM INCLUDING WHETHER OR NOT HE NEEDS ANTIBODIES AND/OR MEDICATION. THE PATIENT DECLINED AN APPOINTMENT AT THIS TIME. PLEASE CALL THE PATIENT AT  [phone]     What is the patients preferred pharmacy: [email or web address] - [place], [address], [address] - [phone] [facility] [phone]

## 2022-01-24 NOTE — TELEPHONE ENCOUNTER
Patient tested positive for COVID 01/19/2022; continues to have cough, SOA and headache.  Wants to know what treatment option you suggest.

## 2022-01-24 NOTE — TELEPHONE ENCOUNTER
Due to his having COPD as well, recommend we do abx, steroids, cough med, and he needs to use his Symbicort and albuterol as rx'd.     If he worsens he should go to ER.

## 2022-01-31 RX ORDER — DICLOFENAC SODIUM 75 MG/1
TABLET, DELAYED RELEASE ORAL
Qty: 60 TABLET | Refills: 2 | Status: SHIPPED | OUTPATIENT
Start: 2022-01-31 | End: 2022-03-28

## 2022-02-25 ENCOUNTER — OFFICE VISIT (OUTPATIENT)
Dept: PULMONOLOGY | Facility: CLINIC | Age: 62
End: 2022-02-25

## 2022-02-25 VITALS
HEIGHT: 69 IN | WEIGHT: 168 LBS | BODY MASS INDEX: 24.88 KG/M2 | DIASTOLIC BLOOD PRESSURE: 74 MMHG | SYSTOLIC BLOOD PRESSURE: 130 MMHG | TEMPERATURE: 97.3 F | OXYGEN SATURATION: 99 % | HEART RATE: 88 BPM

## 2022-02-25 DIAGNOSIS — Z87.891 PERSONAL HISTORY OF NICOTINE DEPENDENCE: ICD-10-CM

## 2022-02-25 DIAGNOSIS — R05.9 COUGH: ICD-10-CM

## 2022-02-25 DIAGNOSIS — J43.2 CENTRILOBULAR EMPHYSEMA: ICD-10-CM

## 2022-02-25 DIAGNOSIS — J44.1 COPD WITH ACUTE EXACERBATION: Primary | ICD-10-CM

## 2022-02-25 DIAGNOSIS — Z71.6 ENCOUNTER FOR TOBACCO USE CESSATION COUNSELING: ICD-10-CM

## 2022-02-25 PROCEDURE — 96372 THER/PROPH/DIAG INJ SC/IM: CPT | Performed by: INTERNAL MEDICINE

## 2022-02-25 PROCEDURE — 99214 OFFICE O/P EST MOD 30 MIN: CPT | Performed by: INTERNAL MEDICINE

## 2022-02-25 RX ORDER — OXYCODONE HYDROCHLORIDE AND ACETAMINOPHEN 5; 325 MG/1; MG/1
TABLET ORAL
COMMUNITY
Start: 2022-02-11 | End: 2022-03-28

## 2022-02-25 RX ORDER — BUDESONIDE AND FORMOTEROL FUMARATE DIHYDRATE 160; 4.5 UG/1; UG/1
2 AEROSOL RESPIRATORY (INHALATION) 2 TIMES DAILY
Qty: 1 EACH | Refills: 5 | Status: SHIPPED | OUTPATIENT
Start: 2022-02-25 | End: 2022-03-28

## 2022-02-25 RX ORDER — DEXAMETHASONE 4 MG/1
TABLET ORAL
COMMUNITY
End: 2022-02-25

## 2022-02-25 RX ORDER — CEFDINIR 300 MG/1
300 CAPSULE ORAL 2 TIMES DAILY
Qty: 10 CAPSULE | Refills: 0 | Status: SHIPPED | OUTPATIENT
Start: 2022-02-25 | End: 2022-03-02

## 2022-02-25 RX ORDER — ALBUTEROL SULFATE 90 UG/1
2 AEROSOL, METERED RESPIRATORY (INHALATION) EVERY 4 HOURS PRN
Qty: 16 G | Refills: 5 | Status: SHIPPED | OUTPATIENT
Start: 2022-02-25 | End: 2022-08-08 | Stop reason: SDUPTHER

## 2022-02-25 RX ORDER — DEXAMETHASONE SODIUM PHOSPHATE 4 MG/ML
4 INJECTION, SOLUTION INTRA-ARTICULAR; INTRALESIONAL; INTRAMUSCULAR; INTRAVENOUS; SOFT TISSUE ONCE
Status: COMPLETED | OUTPATIENT
Start: 2022-02-25 | End: 2022-02-25

## 2022-02-25 RX ORDER — PREDNISONE 10 MG/1
TABLET ORAL
Qty: 31 TABLET | Refills: 0 | Status: SHIPPED | OUTPATIENT
Start: 2022-02-25 | End: 2022-03-28

## 2022-02-25 RX ORDER — NICOTINE 21 MG/24HR
1 PATCH, TRANSDERMAL 24 HOURS TRANSDERMAL EVERY 24 HOURS
Qty: 30 EACH | Refills: 2 | Status: SHIPPED | OUTPATIENT
Start: 2022-02-25 | End: 2022-07-26 | Stop reason: HOSPADM

## 2022-02-25 RX ORDER — MELOXICAM 15 MG/1
TABLET ORAL
COMMUNITY
Start: 2022-01-04 | End: 2022-05-02 | Stop reason: SDUPTHER

## 2022-02-25 RX ADMIN — DEXAMETHASONE SODIUM PHOSPHATE 4 MG: 4 INJECTION, SOLUTION INTRA-ARTICULAR; INTRALESIONAL; INTRAMUSCULAR; INTRAVENOUS; SOFT TISSUE at 11:03

## 2022-02-25 NOTE — PROGRESS NOTES
Pulmonary follow-up office Visit      Chief Complaint:    Chief Complaint   Patient presents with   • Follow-up       History of Present Illness: Gaston Bagley is a 61 y.o. male who is here today to follow up for moderate COPD.    Since last visit, he got COVID and had moderate shortness of breath, cough with white mucus and myalgias, but no fevers.  He did have diarrhea, but that has resolved.  He also lost sense of smell which is slowly coming back.  Did not require hospitalization, but required codeine cough syrup and dexamethasone and Z-Alban for symptomatic relief.  He felt like his breathing had almost gotten back to baseline, but this last week has noticed increased moderate cough with thick, yellow mucus.  He has had to use albuterol 2-3x per day which is increased.  + increased wheezing, but no fevers/chills.    He is able to walk on flat ground for several minutes, but notices he has to go slower than he used to.  It will improve with rest and his albuterol.    He continues to smoke, but is now ready to consider quitting considering how bad he felt with Covid.  Is willing to try nicotine patches.    Current Regimen: Symbicort 160 bid and prn albuterol  States he never got his Incruse prescription filled.  Is not sure why.      Subjective      Review of Systems:   Review of Systems   Constitutional: Positive for fatigue. Negative for chills, unexpected weight gain and unexpected weight loss.   HENT: Negative for postnasal drip, rhinorrhea, sinus pressure, trouble swallowing and voice change.    Eyes: Negative for discharge and itching.   Respiratory: Positive for cough, shortness of breath and wheezing.    Cardiovascular: Negative for chest pain, palpitations and leg swelling.   Gastrointestinal: Negative for abdominal distention, constipation, diarrhea and GERD.   Endocrine: Negative for cold intolerance and heat intolerance.   Genitourinary: Negative for dysuria and hematuria.   Musculoskeletal:  Negative for arthralgias and myalgias.   Skin: Negative for color change and rash.   Allergic/Immunologic: Negative for environmental allergies and food allergies.   Neurological: Negative for dizziness, speech difficulty, light-headedness and memory problem.   Hematological: Negative for adenopathy. Does not bruise/bleed easily.   Psychiatric/Behavioral: Negative for sleep disturbance and depressed mood. The patient is not nervous/anxious.        Medications:     Current Outpatient Medications:   •  acetaminophen (TYLENOL) 500 MG tablet, Take 500 mg by mouth Daily., Disp: , Rfl:   •  atorvastatin (LIPITOR) 40 MG tablet, TAKE 1 TABLET BY MOUTH AT BEDTIME, Disp: 30 tablet, Rfl: 4  •  budesonide-formoterol (SYMBICORT) 160-4.5 MCG/ACT inhaler, Inhale 2 puffs 2 (Two) Times a Day., Disp: 1 each, Rfl: 5  •  carvedilol (COREG) 12.5 MG tablet, Take 12.5 mg by mouth 2 (Two) Times a Day With Meals., Disp: , Rfl:   •  Cholecalciferol (vitamin D3) 125 MCG (5000 UT) capsule capsule, Take 1 capsule by mouth Daily., Disp: 30 capsule, Rfl: 5  •  diclofenac (VOLTAREN) 75 MG EC tablet, TAKE 1 TABLET BY MOUTH TWICE DAILY, Disp: 60 tablet, Rfl: 2  •  gabapentin (NEURONTIN) 600 MG tablet, Take 600 mg by mouth 3 (Three) Times a Day., Disp: , Rfl:   •  HYDROcodone-acetaminophen (NORCO) 5-325 MG per tablet, Take 1 tablet by mouth 2 (Two) Times a Day As Needed for Severe Pain ., Disp: , Rfl:   •  meloxicam (MOBIC) 15 MG tablet, , Disp: , Rfl:   •  NARCAN 4 MG/0.1ML nasal spray, 1 spray into the nostril(s) as directed by provider As Needed., Disp: , Rfl:   •  O2 (OXYGEN), Inhale 2 L/min 1 (One) Time. At night, Disp: , Rfl:   •  oxyCODONE-acetaminophen (PERCOCET) 5-325 MG per tablet, , Disp: , Rfl:   •  pantoprazole (PROTONIX) 40 MG EC tablet, Take 40 mg by mouth Daily., Disp: , Rfl:   •  rOPINIRole (REQUIP) 0.25 MG tablet, TAKE 1 TABLET BY MOUTH THREE TIMES DAILY . TAKE 1 HOUR BEFORE BEDTIME, Disp: 90 tablet, Rfl: 4  •  tiZANidine (ZANAFLEX)  "4 MG tablet, Take 1 tablet by mouth Every 8 (Eight) Hours As Needed for Muscle Spasms., Disp: 90 tablet, Rfl: 3  •  triamcinolone (KENALOG) 0.1 % ointment, Apply  topically to the appropriate area as directed 2 (Two) Times a Day., Disp: 30 g, Rfl: 0  •  valsartan (Diovan) 40 MG tablet, Take 1 tablet by mouth Daily., Disp: 30 tablet, Rfl: 5  •  albuterol sulfate HFA (ProAir HFA) 108 (90 Base) MCG/ACT inhaler, Inhale 2 puffs Every 4 (Four) Hours As Needed for Wheezing or Shortness of Air., Disp: 16 g, Rfl: 5  •  azithromycin (Zithromax Z-Alban) 250 MG tablet, Take 2 tablets the first day, then 1 tablet daily for 4 days., Disp: 6 tablet, Rfl: 0  •  cefdinir (OMNICEF) 300 MG capsule, Take 1 capsule by mouth 2 (Two) Times a Day for 5 days., Disp: 10 capsule, Rfl: 0  •  nicotine (NICODERM CQ) 14 MG/24HR patch, Place 1 patch on the skin as directed by provider Daily., Disp: 30 each, Rfl: 2  •  predniSONE (DELTASONE) 10 MG tablet, Take 4 tabs daily x 3 days, then take 3 tabs daily x 3 days, then take 2 tabs daily x 3 days, then take 1 tab daily x 3 days, Disp: 31 tablet, Rfl: 0  •  promethazine-dextromethorphan (PROMETHAZINE-DM) 6.25-15 MG/5ML syrup, Take 5 mL by mouth 4 (Four) Times a Day As Needed for Cough., Disp: 118 mL, Rfl: 0  •  Umeclidinium Bromide (Incruse Ellipta) 62.5 MCG/INH aerosol powder , Inhale 1 puff Daily for 30 days., Disp: 30 each, Rfl: 5    Current Facility-Administered Medications:   •  dexamethasone (DECADRON) injection 4 mg, 4 mg, Intravenous, Once, Gyae James MD    Allergies:   No Known Allergies    Objective     Physical Exam:  Vital Signs:   Vitals:    02/25/22 1014   BP: 130/74   BP Location: Right arm   Patient Position: Sitting   Cuff Size: Adult   Pulse: 88   Temp: 97.3 °F (36.3 °C)   TempSrc: Temporal   SpO2: 99%   Weight: 76.2 kg (168 lb)   Height: 175.3 cm (69.02\")       Physical Exam  Vitals and nursing note reviewed.   Constitutional:       General: He is not in acute " distress.     Appearance: He is well-developed. He is not ill-appearing or toxic-appearing.      Comments: Appears older than stated age, + smells of smoke   HENT:      Head: Normocephalic and atraumatic.      Right Ear: External ear normal.      Left Ear: External ear normal.      Nose: No congestion or rhinorrhea.      Mouth/Throat:      Mouth: Mucous membranes are moist.      Pharynx: Oropharynx is clear. No oropharyngeal exudate.   Eyes:      General: No scleral icterus.        Right eye: No discharge.         Left eye: No discharge.      Extraocular Movements: Extraocular movements intact.   Neck:      Trachea: No tracheal deviation.   Cardiovascular:      Rate and Rhythm: Normal rate and regular rhythm.   Pulmonary:      Effort: No accessory muscle usage or respiratory distress.      Breath sounds: Wheezing present.      Comments: Decreased breath sounds throughout with expiratory wheeze  Abdominal:      General: There is no distension.      Palpations: Abdomen is soft.   Musculoskeletal:         General: Normal range of motion.      Cervical back: Normal range of motion and neck supple.      Right lower leg: No edema.      Left lower leg: No edema.   Skin:     General: Skin is warm and dry.      Coloration: Skin is not jaundiced.      Findings: No rash.   Neurological:      General: No focal deficit present.      Mental Status: He is alert and oriented to person, place, and time.      Motor: No weakness.      Gait: Gait normal.   Psychiatric:         Mood and Affect: Mood normal.         Behavior: Behavior normal.         Thought Content: Thought content normal.         Judgment: Judgment normal.         Results Review:   Labs: Reviewed.  Feb 2021  Fungal serologies negative  A1AT 173    Lab Results   Component Value Date    WBC 6.57 11/29/2021    HGB 14.4 11/29/2021    HCT 42.4 11/29/2021    MCV 87.1 11/29/2021     11/29/2021     Lab Results   Component Value Date    GLUCOSE 79 11/29/2021    BUN 21  (H) 11/29/2021    CREATININE 0.63 (L) 11/29/2021    EGFRIFNONA 130 11/29/2021    EGFRIFAFRI >150 11/29/2021    BCR 33.3 (H) 11/29/2021    K 4.3 11/29/2021    CO2 28.0 11/29/2021    CALCIUM 9.0 11/29/2021    PROTENTOTREF 6.3 11/29/2021    ALBUMIN 4.40 11/29/2021    LABIL2 2.3 11/29/2021    AST 11 11/29/2021    ALT 11 11/29/2021 2021 alpha-1 antitrypsin level 169    Micro: As of February 25, 2022   No results found for: RESPCX  No results found for: BLOODCX  No results found for: URINECX  No results found for: MRSACX  No results found for: MRSAPCR  No results found for: URCX  No components found for: LOWRESPCF  No results found for: THROATCX  No results found for: CULTURES  No components found for: STREPBCX  No results found for: STREPPNEUAG  No results found for: LEGIONELLA  No results found for: MYCOPLASCX  No results found for: GCCX  No results found for: WOUNDCX  No results found for: BODYFLDCX    ABG: No results found for: PHART, ARR3MLE, PO2ART, HGBBG, V4VTGGKZ, CFIO2, FCOHB, CARBOXYHGB, FMETHB    Radiology Scans: I personally reviewed the following films:    CT Chest Low Dose Cancer Screening WO  Narrative: CT CHEST LOW DOSE SCREENING     PROCEDURE: CT CHEST LOW DOSE CANCER SCREENING WO-     DOSE:  CTDIvol (mGy): 1.72 mGy     DLP (mGycm): 65.45 mGy.cm      HISTORY: Lung cancer screening, >= 30 pk-yr current smoker (Age 55-80y);  Z87.891-Personal history of nicotine dependence     SMOKING STATUS:  Current         PACK YEARS:  47     COMPARISON: None.     TECHNIQUE: Low-dose axial CT without IV contrast administration.     FINDINGS:     No acute lung disease is present.Numerous nodules are noted throughout  the lungs in a multi lobar distribution. The majority are calcified.  Largest measure up to 5 mm. There is no dominant lung mass.     No pleural or pericardial effusion is seen. No adenopathy or mass lesion  is present.     Impression: 1. Multiple subcentimeter lung nodules, largest measuring up to 5  mm.  Favor granulomas.     LUNG RADS CATEGORY: 2, benign appearance     RECOMMENDATION: LDCT in 12 months            This study was performed with techniques to keep radiation doses as low  as reasonably achievable (ALARA). Individualized dose reduction  techniques using automated exposure control or adjustment of vA and/or  kV according to the patient size were employed.         This report was finalized on 9/29/2021 11:57 AM by Zackery Penn MD.      Nov 2020 Ct chest with stable 5mm nodules, no lymphadenopathy      PFT IMPRESSION:    February 2021 PFT showed FEV1 68%, FEV1/FVC ratio 56.  Significant bronchodilator responsiveness.  Air-trapping noted.  No hyperinflation.  Decreased diffusing capacity.    Assessment / Plan      Assessment/Plan:    1. COPD with acute exacerbation (HCC)  Most likely related to recent Covid infection.  We will go ahead and empirically treat with antibiotics given thicker darker mucus and he is in the timeframe for secondary bacterial infection.  Will give steroid injection in clinic  Discussed risk, benefits and possible side effects of medication.    - cefdinir (OMNICEF) 300 MG capsule; Take 1 capsule by mouth 2 (Two) Times a Day for 5 days.  Dispense: 10 capsule; Refill: 0  - predniSONE (DELTASONE) 10 MG tablet; Take 4 tabs daily x 3 days, then take 3 tabs daily x 3 days, then take 2 tabs daily x 3 days, then take 1 tab daily x 3 days  Dispense: 31 tablet; Refill: 0  - dexamethasone (DECADRON) injection 4 mg    2. Centrilobular emphysema (HCC)  Known emphysema and currently on ICS/LABA.  Did not start LAMA after last clinic visit.  Reordered Incruse and samples given in clinic.  Discussed need to take medications as prescribed.  - budesonide-formoterol (SYMBICORT) 160-4.5 MCG/ACT inhaler; Inhale 2 puffs 2 (Two) Times a Day.  Dispense: 1 each; Refill: 5  - albuterol sulfate HFA (ProAir HFA) 108 (90 Base) MCG/ACT inhaler; Inhale 2 puffs Every 4 (Four) Hours As Needed for Wheezing or  Shortness of Air.  Dispense: 16 g; Refill: 5  - Umeclidinium Bromide (Incruse Ellipta) 62.5 MCG/INH aerosol powder ; Inhale 1 puff Daily for 30 days.  Dispense: 30 each; Refill: 5    3. Cough  Most likely related to exacerbation and recent Covid.  Treat exacerbation with steroids and will most likely need PFTs at next clinic visit    4. Personal history of nicotine dependence  Tobacco abuse cessation counseling for greater than 6 minutes.  Patient is willing to try nicotine patches and is currently smoking half pack per day.  - nicotine (NICODERM CQ) 14 MG/24HR patch; Place 1 patch on the skin as directed by provider Daily.  Dispense: 30 each; Refill: 2    5. Encounter for tobacco use cessation counseling  Tobacco abuse cessation counseling for greater than 6 minutes.  Treat with nicotine patches.          Follow Up:   Return in about 3 months (around 5/25/2022), or With Sol.    Gaye James MD  Pulmonary/Critical Care Physician   [facility]      Please note that portions of this note may have been completed with a voice recognition program. Efforts were made to edit the dictations, but occasionally words are mistranscribed.

## 2022-02-28 ENCOUNTER — OFFICE VISIT (OUTPATIENT)
Dept: FAMILY MEDICINE CLINIC | Facility: CLINIC | Age: 62
End: 2022-02-28

## 2022-02-28 VITALS
HEART RATE: 122 BPM | HEIGHT: 69 IN | OXYGEN SATURATION: 97 % | SYSTOLIC BLOOD PRESSURE: 150 MMHG | WEIGHT: 173.2 LBS | BODY MASS INDEX: 25.65 KG/M2 | TEMPERATURE: 98 F | DIASTOLIC BLOOD PRESSURE: 82 MMHG

## 2022-02-28 DIAGNOSIS — J44.9 CHRONIC OBSTRUCTIVE PULMONARY DISEASE, UNSPECIFIED COPD TYPE: ICD-10-CM

## 2022-02-28 DIAGNOSIS — F17.200 TOBACCO USE DISORDER: ICD-10-CM

## 2022-02-28 DIAGNOSIS — E78.2 MIXED HYPERLIPIDEMIA: ICD-10-CM

## 2022-02-28 DIAGNOSIS — T14.8XXA BRUISING: ICD-10-CM

## 2022-02-28 DIAGNOSIS — I10 ESSENTIAL HYPERTENSION: Primary | ICD-10-CM

## 2022-02-28 DIAGNOSIS — J44.1 COPD WITH ACUTE EXACERBATION: Primary | ICD-10-CM

## 2022-02-28 PROCEDURE — 99214 OFFICE O/P EST MOD 30 MIN: CPT | Performed by: FAMILY MEDICINE

## 2022-02-28 RX ORDER — ACLIDINIUM BROMIDE 400 UG/1
1 POWDER, METERED RESPIRATORY (INHALATION) 2 TIMES DAILY
Qty: 1 EACH | Refills: 5 | Status: SHIPPED | OUTPATIENT
Start: 2022-02-28 | End: 2022-08-08 | Stop reason: SDUPTHER

## 2022-03-01 LAB
ERYTHROCYTE [DISTWIDTH] IN BLOOD BY AUTOMATED COUNT: 13.3 % (ref 12.3–15.4)
HCT VFR BLD AUTO: 40.5 % (ref 37.5–51)
HGB BLD-MCNC: 13.8 G/DL (ref 13–17.7)
MCH RBC QN AUTO: 29.2 PG (ref 26.6–33)
MCHC RBC AUTO-ENTMCNC: 34.1 G/DL (ref 31.5–35.7)
MCV RBC AUTO: 85.8 FL (ref 79–97)
PLATELET # BLD AUTO: 215 10*3/MM3 (ref 140–450)
RBC # BLD AUTO: 4.72 10*6/MM3 (ref 4.14–5.8)
WBC # BLD AUTO: 8.98 10*3/MM3 (ref 3.4–10.8)

## 2022-03-07 RX ORDER — ROPINIROLE 0.25 MG/1
TABLET, FILM COATED ORAL
Qty: 90 TABLET | Refills: 3 | Status: SHIPPED | OUTPATIENT
Start: 2022-03-07 | End: 2022-08-01

## 2022-03-28 ENCOUNTER — OFFICE VISIT (OUTPATIENT)
Dept: FAMILY MEDICINE CLINIC | Facility: CLINIC | Age: 62
End: 2022-03-28

## 2022-03-28 VITALS
TEMPERATURE: 98.3 F | BODY MASS INDEX: 25.03 KG/M2 | HEIGHT: 69 IN | SYSTOLIC BLOOD PRESSURE: 162 MMHG | WEIGHT: 169 LBS | DIASTOLIC BLOOD PRESSURE: 104 MMHG | OXYGEN SATURATION: 98 % | HEART RATE: 73 BPM

## 2022-03-28 DIAGNOSIS — E55.9 VITAMIN D DEFICIENCY: ICD-10-CM

## 2022-03-28 DIAGNOSIS — Z99.81 DEPENDENCE ON NOCTURNAL OXYGEN THERAPY: ICD-10-CM

## 2022-03-28 DIAGNOSIS — I10 UNCONTROLLED HYPERTENSION: Primary | ICD-10-CM

## 2022-03-28 DIAGNOSIS — R61 CHRONIC NIGHT SWEATS: ICD-10-CM

## 2022-03-28 DIAGNOSIS — E53.8 VITAMIN B 12 DEFICIENCY: ICD-10-CM

## 2022-03-28 DIAGNOSIS — R79.9 ABNORMAL BLOOD CHEMISTRY: ICD-10-CM

## 2022-03-28 DIAGNOSIS — J44.9 CHRONIC OBSTRUCTIVE PULMONARY DISEASE, UNSPECIFIED COPD TYPE: ICD-10-CM

## 2022-03-28 PROCEDURE — 99214 OFFICE O/P EST MOD 30 MIN: CPT | Performed by: FAMILY MEDICINE

## 2022-03-28 RX ORDER — VALSARTAN 80 MG/1
80 TABLET ORAL DAILY
Qty: 30 TABLET | Refills: 5 | Status: SHIPPED | OUTPATIENT
Start: 2022-03-28 | End: 2022-08-08 | Stop reason: SDUPTHER

## 2022-03-28 NOTE — PROGRESS NOTES
"Subjective   Gaston Bagley is a 61 y.o. male.     History of Present Illness   Mr. Bagley presents today for follow-up on hypertension.  He has brought home BP log for my review.  Still averaging systolics in the 160s.  Currently on valsartan 40, carvedilol 12.5 twice daily.  Taking as prescribed.    Has comorbid COPD.  Has not been able to afford his Symbicort stating it is \"not covered\".  Continues to use Tudorza with albuterol as needed.  Continues to have chronic cough, wheeze, shortness of breath.  Denies fever, chest pain, unintentional weight loss, change in sputum production.  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    Taking vitamin D for deficiency.  Previously low B12 as well, not currently on replacement.    Prediabetic A1c in past.  Does not follow diabetic appropriate diet.    The following portions of the patient's history were reviewed and updated as appropriate: allergies, current medications, past family history, past medical history, past social history, past surgical history and problem list.    Review of Systems   Constitutional: Positive for fatigue. Negative for fever.   HENT: Negative for congestion, ear pain, hearing loss, nosebleeds, rhinorrhea, sneezing, sore throat, tinnitus and trouble swallowing.    Eyes: Negative for pain, discharge, redness and visual disturbance.   Respiratory: Positive for cough, shortness of breath and wheezing. Negative for chest tightness.    Cardiovascular: Positive for palpitations. Negative for chest pain and leg swelling.   Gastrointestinal: Negative for abdominal pain, blood in stool, constipation, diarrhea, nausea and vomiting.   Endocrine: Negative for cold intolerance, heat intolerance, polydipsia and polyuria.        Night sweats   Genitourinary: Negative for dysuria, flank pain, frequency, hematuria and urgency.   Musculoskeletal: Positive for arthralgias, back pain and myalgias. Negative " for joint swelling and neck pain.   Skin: Negative for rash and wound.   Neurological: Negative for dizziness, tremors, seizures, syncope, speech difficulty, weakness, numbness and headaches.   Hematological: Negative for adenopathy. Bruises/bleeds easily.   Psychiatric/Behavioral: Negative for confusion, dysphoric mood and sleep disturbance. The patient is not nervous/anxious.    Pt's previous ROS reviewed and updated as indicated.       Objective    Vitals:    03/28/22 1556   BP: (!) 162/104   Pulse: 73   Temp: 98.3 °F (36.8 °C)   SpO2: 98%     Body mass index is 24.96 kg/m².      03/28/22  1556   Weight: 76.7 kg (169 lb)     Physical Exam  Vitals and nursing note reviewed.   Constitutional:       General: He is not in acute distress.     Appearance: He is well-developed, well-groomed and normal weight. He is not ill-appearing.      Comments: Appears older than stated age   HENT:      Head: Normocephalic and atraumatic.   Eyes:      General: No scleral icterus.     Conjunctiva/sclera: Conjunctivae normal.   Neck:      Thyroid: No thyroid mass.      Vascular: Normal carotid pulses. No carotid bruit.   Cardiovascular:      Rate and Rhythm: Normal rate and regular rhythm.      Pulses: Decreased pulses (pedal).      Heart sounds: Heart sounds are distant.   Pulmonary:      Effort: Pulmonary effort is normal.      Breath sounds: Decreased breath sounds and wheezing present. No rhonchi or rales.   Musculoskeletal:      Right lower leg: No edema.      Left lower leg: No edema.   Skin:     General: Skin is warm and dry.      Coloration: Skin is not jaundiced or pale.      Findings: No rash.   Neurological:      Mental Status: He is alert and oriented to person, place, and time.      Motor: No tremor.      Gait: Gait is intact.   Psychiatric:         Mood and Affect: Mood and affect normal.         Behavior: Behavior normal. Behavior is cooperative.         Cognition and Memory: Cognition normal.     Pt's previous physical  exam reviewed and updated as indicated.    Lab Results   Component Value Date    WBC 8.98 02/28/2022    HGB 13.8 02/28/2022    HCT 40.5 02/28/2022    MCV 85.8 02/28/2022     02/28/2022       Lab Results   Component Value Date    GLUCOSE 79 11/29/2021    BUN 21 (H) 11/29/2021    CREATININE 0.63 (L) 11/29/2021    EGFRIFNONA 130 11/29/2021    EGFRIFAFRI >150 11/29/2021    BCR 33.3 (H) 11/29/2021    K 4.3 11/29/2021    CO2 28.0 11/29/2021    CALCIUM 9.0 11/29/2021    PROTENTOTREF 6.3 11/29/2021    ALBUMIN 4.40 11/29/2021    LABIL2 2.3 11/29/2021    AST 11 11/29/2021    ALT 11 11/29/2021       Lab Results   Component Value Date    CHLPL 89 11/29/2021    TRIG 76 11/29/2021    HDL 37 (L) 11/29/2021    LDL 36 11/29/2021       Lab Results   Component Value Date    TSH 0.843 11/29/2021           Assessment/Plan   Diagnoses and all orders for this visit:    1. Uncontrolled hypertension (Primary)  -     valsartan (Diovan) 80 MG tablet; Take 1 tablet by mouth Daily.  Dispense: 30 tablet; Refill: 5    2. Vitamin B 12 deficiency  -     Vitamin B12    3. Vitamin D deficiency  -     Vitamin D 25 Hydroxy    4. Abnormal blood chemistry  -     Hemoglobin A1c    5. Chronic obstructive pulmonary disease, unspecified COPD type (HCC)  -     Fluticasone Furoate-Vilanterol (Breo Ellipta) 200-25 MCG/INH inhaler; Inhale 1 puff Daily.  Dispense: 28 each; Refill: 11    6. Dependence on nocturnal oxygen therapy    7. Chronic night sweats       Uncontrolled hypertension.  Increase valsartan 80 mg once daily, continue carvedilol twice daily.  Reminded of appropriate heart healthy eating, daily walking.  Continue home BP log.    Assess status of vit/min deficiencies and replace as indicated.    Poorly controlled COPD due to persistent smoking. Tobacco cessation advised.  Pt voiced understanding of health risks of cont'd tobacco use.  Breo Ellipta prescription sent in to replace Symbicort if covered by insurance.  Continue Tudorza, butyryl..   Continue supplemental nocturnal oxygen.    He continues to have night sweats of unclear etiology, not improved with supplemental oxygen.  We will see if improved control of hypertension resolves issue.    Recheck A1c, treat as indicated.  Diabetes prevention reviewed.    Follow-up in 1 month, sooner as needed/instructed.  I will contact patient regarding test results and provide instructions regarding any necessary changes in plan of care..whit  Pt is aware of reasons to seek emergent care.  Patient voiced understanding of all instructions and denied further questions.    Please note that portions of this note may have been completed with a voice recognition program. Efforts were made to edit the dictations, but occasionally words are mistranscribed.

## 2022-03-29 PROBLEM — R61 CHRONIC NIGHT SWEATS: Status: ACTIVE | Noted: 2022-03-29

## 2022-03-29 LAB
25(OH)D3+25(OH)D2 SERPL-MCNC: 28.5 NG/ML (ref 30–100)
HBA1C MFR BLD: 5.6 % (ref 4.8–5.6)
VIT B12 SERPL-MCNC: 251 PG/ML (ref 232–1245)

## 2022-03-31 DIAGNOSIS — E53.8 VITAMIN B 12 DEFICIENCY: Primary | ICD-10-CM

## 2022-03-31 DIAGNOSIS — E55.9 VITAMIN D DEFICIENCY: ICD-10-CM

## 2022-03-31 RX ORDER — CYANOCOBALAMIN 1000 UG/ML
1000 INJECTION, SOLUTION INTRAMUSCULAR; SUBCUTANEOUS
Status: DISCONTINUED | OUTPATIENT
Start: 2022-04-01 | End: 2022-12-30

## 2022-03-31 RX ORDER — ERGOCALCIFEROL 1.25 MG/1
50000 CAPSULE ORAL WEEKLY
Qty: 5 CAPSULE | Refills: 5 | Status: SHIPPED | OUTPATIENT
Start: 2022-03-31 | End: 2022-11-10

## 2022-04-22 ENCOUNTER — CLINICAL SUPPORT (OUTPATIENT)
Dept: FAMILY MEDICINE CLINIC | Facility: CLINIC | Age: 62
End: 2022-04-22

## 2022-04-22 DIAGNOSIS — E53.8 VITAMIN B 12 DEFICIENCY: ICD-10-CM

## 2022-04-22 PROCEDURE — 96372 THER/PROPH/DIAG INJ SC/IM: CPT | Performed by: FAMILY MEDICINE

## 2022-04-22 RX ADMIN — CYANOCOBALAMIN 1000 MCG: 1000 INJECTION, SOLUTION INTRAMUSCULAR; SUBCUTANEOUS at 10:09

## 2022-05-02 ENCOUNTER — OFFICE VISIT (OUTPATIENT)
Dept: FAMILY MEDICINE CLINIC | Facility: CLINIC | Age: 62
End: 2022-05-02

## 2022-05-02 VITALS
OXYGEN SATURATION: 96 % | DIASTOLIC BLOOD PRESSURE: 90 MMHG | TEMPERATURE: 98.4 F | HEART RATE: 84 BPM | WEIGHT: 170 LBS | SYSTOLIC BLOOD PRESSURE: 146 MMHG | BODY MASS INDEX: 25.18 KG/M2 | HEIGHT: 69 IN

## 2022-05-02 DIAGNOSIS — Z51.81 ENCOUNTER FOR MONITORING CHRONIC NSAID THERAPY: ICD-10-CM

## 2022-05-02 DIAGNOSIS — J44.9 CHRONIC OBSTRUCTIVE PULMONARY DISEASE, UNSPECIFIED COPD TYPE: ICD-10-CM

## 2022-05-02 DIAGNOSIS — M51.36 DDD (DEGENERATIVE DISC DISEASE), LUMBAR: ICD-10-CM

## 2022-05-02 DIAGNOSIS — Z79.1 ENCOUNTER FOR MONITORING CHRONIC NSAID THERAPY: ICD-10-CM

## 2022-05-02 DIAGNOSIS — I10 ESSENTIAL HYPERTENSION: Primary | ICD-10-CM

## 2022-05-02 PROCEDURE — 99214 OFFICE O/P EST MOD 30 MIN: CPT | Performed by: FAMILY MEDICINE

## 2022-05-02 RX ORDER — MELOXICAM 15 MG/1
15 TABLET ORAL DAILY
Qty: 30 TABLET | Refills: 5 | Status: SHIPPED | OUTPATIENT
Start: 2022-05-02

## 2022-05-02 RX ORDER — PANTOPRAZOLE SODIUM 40 MG/1
40 TABLET, DELAYED RELEASE ORAL DAILY
Qty: 30 TABLET | Refills: 5 | Status: SHIPPED | OUTPATIENT
Start: 2022-05-02 | End: 2022-11-04

## 2022-05-02 RX ORDER — PREDNISONE 20 MG/1
TABLET ORAL
Qty: 14 TABLET | Refills: 0 | Status: SHIPPED | OUTPATIENT
Start: 2022-05-02 | End: 2022-08-08

## 2022-05-02 RX ORDER — DOXYCYCLINE 100 MG/1
100 TABLET ORAL 2 TIMES DAILY
Qty: 20 TABLET | Refills: 0 | Status: SHIPPED | OUTPATIENT
Start: 2022-05-02 | End: 2022-05-12

## 2022-05-02 NOTE — PROGRESS NOTES
Subjective   Gaston Bagley is a 61 y.o. male.     History of Present Illness   Mr. Bagley presents today for recheck hypertension.  At his last visit valsartan dose increased to 80 mg.  He also continued his carvedilol.  Has been taking medication as prescribed.  Not checking BP at home.  Denies chest pain, no new focal neurological symptoms.    Continues to smoke.  Has COPD.  Chronic cough, sputum production, chest tightness, intermittent wheeze are stable.  He reports good compliance with his inhalers including Breo, albuterol as needed, Tudorza.  Has nebulizer on hand at home for exacerbations.  He is using his oxygen at night as directed.    He request refill of his Mobic as well as PPI which he is taking for GI protection.  Has heartburn/reflux without known esophagitis.    The following portions of the patient's history were reviewed and updated as appropriate: allergies, current medications, past family history, past medical history, past social history, past surgical history and problem list.    Review of Systems   Constitutional: Positive for fatigue. Negative for fever.   HENT: Negative for congestion, ear pain, hearing loss, nosebleeds, rhinorrhea, sneezing, sore throat, tinnitus and trouble swallowing.    Eyes: Negative for pain, discharge, redness and visual disturbance.   Respiratory: Positive for cough, shortness of breath and wheezing. Negative for chest tightness.    Cardiovascular: Positive for palpitations. Negative for chest pain and leg swelling.   Gastrointestinal: Negative for abdominal pain, blood in stool, constipation, diarrhea, nausea and vomiting.   Endocrine: Negative for cold intolerance, heat intolerance, polydipsia and polyuria.        Night sweats   Genitourinary: Negative for dysuria, flank pain, frequency, hematuria and urgency.   Musculoskeletal: Positive for arthralgias, back pain and myalgias. Negative for joint swelling and neck pain.   Skin: Negative for rash and wound.    Neurological: Negative for dizziness, tremors, seizures, syncope, speech difficulty, weakness, numbness and headaches.   Hematological: Negative for adenopathy. Bruises/bleeds easily.   Psychiatric/Behavioral: Negative for confusion, dysphoric mood and sleep disturbance. The patient is not nervous/anxious.    Pt's previous ROS reviewed and updated as indicated.       Objective    Vitals:    05/02/22 1525   BP: 146/90   Pulse: 84   Temp: 98.4 °F (36.9 °C)   SpO2: 96%     Body mass index is 25.1 kg/m².      05/02/22  1525   Weight: 77.1 kg (170 lb)         Physical Exam  Vitals and nursing note reviewed.   Constitutional:       General: He is not in acute distress.     Appearance: He is well-developed, well-groomed and normal weight. He is not ill-appearing.      Comments: Appears older than stated age   Eyes:      General: No scleral icterus.     Conjunctiva/sclera: Conjunctivae normal.   Cardiovascular:      Rate and Rhythm: Normal rate and regular rhythm.      Pulses: Decreased pulses (pedal).      Heart sounds: Heart sounds are distant.   Pulmonary:      Effort: Pulmonary effort is normal.      Breath sounds: Decreased breath sounds present. No wheezing, rhonchi or rales.   Musculoskeletal:      Right lower leg: No edema.      Left lower leg: No edema.   Skin:     General: Skin is warm and dry.      Coloration: Skin is not jaundiced or pale.      Findings: No rash.   Neurological:      Mental Status: He is alert and oriented to person, place, and time.      Motor: No tremor.      Gait: Gait is intact.   Psychiatric:         Mood and Affect: Mood and affect normal.         Behavior: Behavior normal. Behavior is cooperative.         Cognition and Memory: Cognition normal.         Assessment/Plan   Diagnoses and all orders for this visit:    1. Essential hypertension (Primary)    2. Chronic obstructive pulmonary disease, unspecified COPD type (HCC)    3. DDD (degenerative disc disease), lumbar    4. Encounter for  monitoring chronic NSAID therapy    Other orders  -     meloxicam (MOBIC) 15 MG tablet; Take 1 tablet by mouth Daily.  Dispense: 30 tablet; Refill: 5  -     pantoprazole (PROTONIX) 40 MG EC tablet; Take 1 tablet by mouth Daily.  Dispense: 30 tablet; Refill: 5  -     doxycycline (ADOXA) 100 MG tablet; Take 1 tablet by mouth 2 (Two) Times a Day for 10 days.  Dispense: 20 tablet; Refill: 0  -     predniSONE (DELTASONE) 20 MG tablet; 3 po qd x 2 days, then 2 po qd x 2 days, then 1 po qd x 2 days, then 1/2 po qd x 4 days  Dispense: 14 tablet; Refill: 0       Hypertension significantly improved but not yet at goal.  Continue carvedilol, valsartan at current dose.  Reinitiate home BP monitoring 3 days/week morning and evening.  Record for my review.  Report persistent elevations above goal of 140/90.  Lifestyle modification for management of hypertension also reviewed.  He is aware that his NSAID use may be affecting his blood pressure control.    COPD appearing stable although somewhat poorly controlled due to persistent smoking.  Continue Tudorza, Breo, albuterol as needed, nocturnal oxygen supplementation.  I provided prescriptions for doxycycline and prednisone to be taken in case of exacerbation to avoid ER visit and/or hospital admission if possible.    He is up-to-date on vaccinations other than COVID vaccination which he has declined.  Up-to-date on lung cancer screening.    Chronic pain due to lumbar degenerative disc disease responsive to meloxicam.  Her/benefits again reviewed.  Refill provided.    Follow-up in 3 months, sooner as needed.  Patient was encouraged to keep me informed of any acute changes, lack of improvement, or any new concerning symptoms.  Pt is aware of reasons to seek emergent care.  Patient voiced understanding of all instructions and denied further questions.    Please note that portions of this note may have been completed with a voice recognition program. Efforts were made to edit the  dictations, but occasionally words are mistranscribed.

## 2022-05-04 PROBLEM — Z51.81 ENCOUNTER FOR MONITORING CHRONIC NSAID THERAPY: Status: ACTIVE | Noted: 2022-05-04

## 2022-05-04 PROBLEM — Z79.1 ENCOUNTER FOR MONITORING CHRONIC NSAID THERAPY: Status: ACTIVE | Noted: 2022-05-04

## 2022-05-23 ENCOUNTER — CLINICAL SUPPORT (OUTPATIENT)
Dept: FAMILY MEDICINE CLINIC | Facility: CLINIC | Age: 62
End: 2022-05-23

## 2022-05-23 DIAGNOSIS — E55.9 VITAMIN D DEFICIENCY: ICD-10-CM

## 2022-05-23 PROCEDURE — 96372 THER/PROPH/DIAG INJ SC/IM: CPT | Performed by: FAMILY MEDICINE

## 2022-05-23 RX ADMIN — CYANOCOBALAMIN 1000 MCG: 1000 INJECTION, SOLUTION INTRAMUSCULAR; SUBCUTANEOUS at 09:51

## 2022-07-20 PROBLEM — T85.193A: Status: ACTIVE | Noted: 2022-07-20

## 2022-07-26 ENCOUNTER — ANESTHESIA (OUTPATIENT)
Dept: PERIOP | Facility: HOSPITAL | Age: 62
End: 2022-07-26

## 2022-07-26 ENCOUNTER — ANESTHESIA EVENT (OUTPATIENT)
Dept: PERIOP | Facility: HOSPITAL | Age: 62
End: 2022-07-26

## 2022-07-26 ENCOUNTER — HOSPITAL ENCOUNTER (OUTPATIENT)
Facility: HOSPITAL | Age: 62
Setting detail: HOSPITAL OUTPATIENT SURGERY
Discharge: HOME | End: 2022-07-26
Attending: ANESTHESIOLOGY | Admitting: ANESTHESIOLOGY

## 2022-07-26 ENCOUNTER — APPOINTMENT (OUTPATIENT)
Dept: GENERAL RADIOLOGY | Facility: HOSPITAL | Age: 62
End: 2022-07-26

## 2022-07-26 VITALS
HEART RATE: 72 BPM | HEIGHT: 72 IN | SYSTOLIC BLOOD PRESSURE: 133 MMHG | WEIGHT: 173 LBS | TEMPERATURE: 97.4 F | BODY MASS INDEX: 23.43 KG/M2 | DIASTOLIC BLOOD PRESSURE: 80 MMHG | RESPIRATION RATE: 24 BRPM | OXYGEN SATURATION: 93 %

## 2022-07-26 DIAGNOSIS — G89.4 CHRONIC PAIN SYNDROME: ICD-10-CM

## 2022-07-26 DIAGNOSIS — T85.193A OTHER MECHANICAL COMPLICATION OF IMPLANTED ELECTRONIC NEUROSTIMULATOR, GENERATOR, INITIAL ENCOUNTER: ICD-10-CM

## 2022-07-26 PROCEDURE — 0 CEFAZOLIN SODIUM-DEXTROSE 2-3 GM-%(50ML) RECONSTITUTED SOLUTION: Performed by: ANESTHESIOLOGY

## 2022-07-26 PROCEDURE — 25010000002 VANCOMYCIN PER 500 MG: Performed by: ANESTHESIOLOGY

## 2022-07-26 PROCEDURE — 25010000002 PROPOFOL 10 MG/ML EMULSION: Performed by: NURSE ANESTHETIST, CERTIFIED REGISTERED

## 2022-07-26 PROCEDURE — 25010000002 FENTANYL CITRATE (PF) 50 MCG/ML SOLUTION: Performed by: NURSE ANESTHETIST, CERTIFIED REGISTERED

## 2022-07-26 PROCEDURE — 25010000002 MIDAZOLAM PER 1MG: Performed by: NURSE ANESTHETIST, CERTIFIED REGISTERED

## 2022-07-26 PROCEDURE — 76000 FLUOROSCOPY <1 HR PHYS/QHP: CPT

## 2022-07-26 RX ORDER — CEFAZOLIN SODIUM IN 0.9 % NACL 3 G/100 ML
3 INTRAVENOUS SOLUTION, PIGGYBACK (ML) INTRAVENOUS ONCE
Status: DISCONTINUED | OUTPATIENT
Start: 2022-07-26 | End: 2022-07-26 | Stop reason: HOSPADM

## 2022-07-26 RX ORDER — FENTANYL CITRATE 50 UG/ML
INJECTION, SOLUTION INTRAMUSCULAR; INTRAVENOUS AS NEEDED
Status: DISCONTINUED | OUTPATIENT
Start: 2022-07-26 | End: 2022-07-26 | Stop reason: SURG

## 2022-07-26 RX ORDER — BUPIVACAINE HYDROCHLORIDE 5 MG/ML
INJECTION, SOLUTION EPIDURAL; INTRACAUDAL AS NEEDED
Status: DISCONTINUED | OUTPATIENT
Start: 2022-07-26 | End: 2022-07-26 | Stop reason: HOSPADM

## 2022-07-26 RX ORDER — ASPIRIN 81 MG/1
81 TABLET, CHEWABLE ORAL DAILY
COMMUNITY

## 2022-07-26 RX ORDER — LIDOCAINE HYDROCHLORIDE 20 MG/ML
INJECTION, SOLUTION INTRAVENOUS AS NEEDED
Status: DISCONTINUED | OUTPATIENT
Start: 2022-07-26 | End: 2022-07-26 | Stop reason: SURG

## 2022-07-26 RX ORDER — SODIUM CHLORIDE, SODIUM LACTATE, POTASSIUM CHLORIDE, CALCIUM CHLORIDE 600; 310; 30; 20 MG/100ML; MG/100ML; MG/100ML; MG/100ML
1000 INJECTION, SOLUTION INTRAVENOUS CONTINUOUS
Status: DISCONTINUED | OUTPATIENT
Start: 2022-07-26 | End: 2022-07-26 | Stop reason: HOSPADM

## 2022-07-26 RX ORDER — VANCOMYCIN HYDROCHLORIDE 500 MG/10ML
INJECTION, POWDER, LYOPHILIZED, FOR SOLUTION INTRAVENOUS AS NEEDED
Status: DISCONTINUED | OUTPATIENT
Start: 2022-07-26 | End: 2022-07-26 | Stop reason: HOSPADM

## 2022-07-26 RX ORDER — LIDOCAINE HYDROCHLORIDE AND EPINEPHRINE BITARTRATE 20; .01 MG/ML; MG/ML
INJECTION, SOLUTION SUBCUTANEOUS AS NEEDED
Status: DISCONTINUED | OUTPATIENT
Start: 2022-07-26 | End: 2022-07-26 | Stop reason: HOSPADM

## 2022-07-26 RX ORDER — IPRATROPIUM BROMIDE AND ALBUTEROL SULFATE 2.5; .5 MG/3ML; MG/3ML
3 SOLUTION RESPIRATORY (INHALATION) ONCE AS NEEDED
Status: COMPLETED | OUTPATIENT
Start: 2022-07-26 | End: 2022-07-26

## 2022-07-26 RX ORDER — MIDAZOLAM HYDROCHLORIDE 2 MG/2ML
INJECTION, SOLUTION INTRAMUSCULAR; INTRAVENOUS AS NEEDED
Status: DISCONTINUED | OUTPATIENT
Start: 2022-07-26 | End: 2022-07-26 | Stop reason: SURG

## 2022-07-26 RX ORDER — CEFAZOLIN SODIUM 2 G/50ML
2 SOLUTION INTRAVENOUS ONCE
Status: COMPLETED | OUTPATIENT
Start: 2022-07-26 | End: 2022-07-26

## 2022-07-26 RX ADMIN — LIDOCAINE HYDROCHLORIDE 60 MG: 20 INJECTION, SOLUTION INTRAVENOUS at 11:37

## 2022-07-26 RX ADMIN — PROPOFOL 140 MCG/KG/MIN: 10 INJECTION, EMULSION INTRAVENOUS at 11:37

## 2022-07-26 RX ADMIN — CEFAZOLIN SODIUM 2 G: 2 SOLUTION INTRAVENOUS at 11:33

## 2022-07-26 RX ADMIN — SODIUM CHLORIDE, POTASSIUM CHLORIDE, SODIUM LACTATE AND CALCIUM CHLORIDE 1000 ML: 600; 310; 30; 20 INJECTION, SOLUTION INTRAVENOUS at 10:31

## 2022-07-26 RX ADMIN — IPRATROPIUM BROMIDE AND ALBUTEROL SULFATE 3 ML: 2.5; .5 SOLUTION RESPIRATORY (INHALATION) at 10:31

## 2022-07-26 RX ADMIN — MIDAZOLAM HYDROCHLORIDE 2 MG: 1 INJECTION, SOLUTION INTRAMUSCULAR; INTRAVENOUS at 11:37

## 2022-07-26 RX ADMIN — FENTANYL CITRATE 50 MCG: 50 INJECTION INTRAMUSCULAR; INTRAVENOUS at 11:50

## 2022-07-26 RX ADMIN — FENTANYL CITRATE 50 MCG: 50 INJECTION INTRAMUSCULAR; INTRAVENOUS at 11:37

## 2022-07-26 NOTE — ANESTHESIA PREPROCEDURE EVALUATION
Anesthesia Evaluation     Patient summary reviewed and Nursing notes reviewed   no history of anesthetic complications:  NPO Solid Status: > 8 hours  NPO Liquid Status: > 8 hours           Airway   Mallampati: II  TM distance: <3 FB  Neck ROM: full  Possible difficult intubation  Dental - normal exam     Pulmonary    (+) a smoker, COPD, wheezes,     PE comment: Duoned preop  Cardiovascular - normal exam    ECG reviewed  Patient on routine beta blocker  Rhythm: regular  Rate: normal    (+) hypertension, dysrhythmias Tachycardia, hyperlipidemia,     ROS comment: +WPW    Neuro/Psych  GI/Hepatic/Renal/Endo    (+)  GERD,  thyroid problem hypothyroidism    Musculoskeletal     (+) back pain, chronic pain,   Abdominal  - normal exam    Abdomen: soft.  Bowel sounds: normal.   Substance History      OB/GYN          Other   arthritis, chronic steroid use                      Anesthesia Plan    ASA 3     MAC     (Risks and benefits discussed including risk of aspiration, recall and dental damage. All patient questions answered. Will continue with POC.)  intravenous induction     Anesthetic plan, risks, benefits, and alternatives have been provided, discussed and informed consent has been obtained with: patient.        CODE STATUS:

## 2022-07-26 NOTE — ANESTHESIA POSTPROCEDURE EVALUATION
Patient: Gaston Bagley    Procedure Summary     Date: 07/26/22 Room / Location: [facility] OR  /  [facility] OR    Anesthesia Start: 1133 Anesthesia Stop: 1236    Procedure: SPINAL CORD STIMULATOR REMOVAL (N/A Back) Diagnosis:       Chronic pain syndrome      Other mechanical complication of implanted electronic neurostimulator, generator, initial encounter ([facility])      (Chronic pain syndrome [G89.4])      (Other mechanical complication of implanted electronic neurostimulator, generator, initial encounter ([facility]) [T85.193A])    Surgeons: Edson Figueroa DO Provider: Jacek Lujan CRNA    Anesthesia Type: MAC ASA Status: 3          Anesthesia Type: MAC    Vitals  No vitals data found for the desired time range.          Post Anesthesia Care and Evaluation    Patient location during evaluation: PHASE II  Patient participation: complete - patient participated  Level of consciousness: awake and alert  Pain score: 2  Pain management: satisfactory to patient    Airway patency: patent  Anesthetic complications: No anesthetic complications  PONV Status: none  Cardiovascular status: acceptable and stable  Respiratory status: acceptable, nonlabored ventilation, spontaneous ventilation and unassisted  Hydration status: acceptable    Comments: Vitals signs as noted in nursing documentation as per protocol.

## 2022-07-30 DIAGNOSIS — Z99.81 DEPENDENCE ON NOCTURNAL OXYGEN THERAPY: Primary | ICD-10-CM

## 2022-07-30 DIAGNOSIS — G25.81 RLS (RESTLESS LEGS SYNDROME): ICD-10-CM

## 2022-08-01 RX ORDER — ROPINIROLE 0.25 MG/1
TABLET, FILM COATED ORAL
Qty: 90 TABLET | Refills: 2 | Status: SHIPPED | OUTPATIENT
Start: 2022-08-01 | End: 2022-11-09

## 2022-08-08 ENCOUNTER — OFFICE VISIT (OUTPATIENT)
Dept: FAMILY MEDICINE CLINIC | Facility: CLINIC | Age: 62
End: 2022-08-08

## 2022-08-08 VITALS
WEIGHT: 172 LBS | HEART RATE: 86 BPM | OXYGEN SATURATION: 96 % | HEIGHT: 72 IN | SYSTOLIC BLOOD PRESSURE: 122 MMHG | DIASTOLIC BLOOD PRESSURE: 70 MMHG | BODY MASS INDEX: 23.3 KG/M2

## 2022-08-08 DIAGNOSIS — E55.9 VITAMIN D DEFICIENCY: ICD-10-CM

## 2022-08-08 DIAGNOSIS — J43.2 CENTRILOBULAR EMPHYSEMA: ICD-10-CM

## 2022-08-08 DIAGNOSIS — I10 UNCONTROLLED HYPERTENSION: ICD-10-CM

## 2022-08-08 DIAGNOSIS — Z99.81 DEPENDENCE ON NOCTURNAL OXYGEN THERAPY: ICD-10-CM

## 2022-08-08 DIAGNOSIS — J44.9 CHRONIC OBSTRUCTIVE PULMONARY DISEASE, UNSPECIFIED COPD TYPE: ICD-10-CM

## 2022-08-08 DIAGNOSIS — E53.8 VITAMIN B 12 DEFICIENCY: ICD-10-CM

## 2022-08-08 DIAGNOSIS — I47.1 PAROXYSMAL SVT (SUPRAVENTRICULAR TACHYCARDIA): ICD-10-CM

## 2022-08-08 DIAGNOSIS — F17.200 TOBACCO USE DISORDER: ICD-10-CM

## 2022-08-08 DIAGNOSIS — I27.20 PULMONARY HYPERTENSION: ICD-10-CM

## 2022-08-08 DIAGNOSIS — I10 ESSENTIAL HYPERTENSION: Primary | ICD-10-CM

## 2022-08-08 DIAGNOSIS — Z28.21 COVID-19 VACCINATION DECLINED: ICD-10-CM

## 2022-08-08 DIAGNOSIS — E78.2 MIXED HYPERLIPIDEMIA: ICD-10-CM

## 2022-08-08 PROCEDURE — 99214 OFFICE O/P EST MOD 30 MIN: CPT | Performed by: FAMILY MEDICINE

## 2022-08-08 RX ORDER — ALBUTEROL SULFATE 90 UG/1
2 AEROSOL, METERED RESPIRATORY (INHALATION) EVERY 4 HOURS PRN
Qty: 16 G | Refills: 5 | Status: SHIPPED | OUTPATIENT
Start: 2022-08-08

## 2022-08-08 RX ORDER — VALSARTAN 80 MG/1
80 TABLET ORAL DAILY
Qty: 30 TABLET | Refills: 5 | Status: SHIPPED | OUTPATIENT
Start: 2022-08-08 | End: 2022-09-27

## 2022-08-08 RX ORDER — CARVEDILOL 12.5 MG/1
12.5 TABLET ORAL 2 TIMES DAILY WITH MEALS
Qty: 60 TABLET | Refills: 5 | Status: SHIPPED | OUTPATIENT
Start: 2022-08-08 | End: 2023-01-30

## 2022-08-08 RX ORDER — ACLIDINIUM BROMIDE 400 UG/1
1 POWDER, METERED RESPIRATORY (INHALATION) 2 TIMES DAILY
Qty: 1 EACH | Refills: 5 | Status: SHIPPED | OUTPATIENT
Start: 2022-08-08 | End: 2022-12-30

## 2022-08-08 RX ORDER — ATORVASTATIN CALCIUM 40 MG/1
40 TABLET, FILM COATED ORAL DAILY
Qty: 30 TABLET | Refills: 5 | Status: SHIPPED | OUTPATIENT
Start: 2022-08-08 | End: 2023-01-30

## 2022-08-08 NOTE — PROGRESS NOTES
Subjective   Gaston Bagley is a 61 y.o. male.     History of Present Illness   Mr. Bgaley presents today for routine follow up on multiple chronic medical problems.     He has Parkinson White, paroxysmal SVT followed by cardiology. Denies change in exercise tolerance other than related to COPD. No chest pain or apparent anginal equivalents. Did have one episode prompting ER visit At [facility] few weeks ago. Neg eval.     He is on nocturnal oxygen for COPD. Does have nebulizer on hand with albuterol and has been using as needed. He is down to smoking 1/2 ppd.  Working on further smoking cessation. No abx since last visit. Planning f/u with pulmonology.     Vitamin D deficient, taking vitamin D 5000 every other day     Taking his valsartan for management of hypertension.     On atorvastatin for dyslipidemia.  Tolerating well.     Was seeing Dr. FOOTE who has left [facility], wife working on f/u with cardiology  Wife working on getting him f/u with pulmonology as well      Immunization History   Administered Date(s) Administered   • Flu Vaccine Quad PF >36MO 10/10/2018, 11/06/2019   • Influenza, Unspecified 10/07/2020   • Pneumococcal Conjugate 13-Valent (PCV13) 08/15/2016   • Pneumococcal Polysaccharide (PPSV23) 08/31/2020   • Td 12/01/2018, 12/01/2018       The following portions of the patient's history were reviewed and updated as appropriate: allergies, current medications, past family history, past medical history, past social history, past surgical history and problem list.    Review of Systems   Constitutional: Positive for fatigue. Negative for fever.   HENT: Negative for congestion, ear pain, hearing loss, nosebleeds, rhinorrhea, sneezing, sore throat, tinnitus and trouble swallowing.    Eyes: Negative for pain, discharge, redness and visual disturbance.   Respiratory: Positive for cough, shortness of breath and wheezing. Negative for chest tightness.    Cardiovascular: Positive for palpitations. Negative for chest  pain and leg swelling.   Gastrointestinal: Negative for abdominal pain, blood in stool, constipation, diarrhea, nausea and vomiting.   Endocrine: Negative for cold intolerance, heat intolerance, polydipsia and polyuria.        Night sweats   Genitourinary: Negative for dysuria, flank pain, frequency, hematuria and urgency.   Musculoskeletal: Positive for arthralgias, back pain and myalgias. Negative for joint swelling and neck pain.   Skin: Negative for rash and wound.   Neurological: Negative for dizziness, tremors, seizures, syncope, speech difficulty, weakness, numbness and headaches.   Hematological: Negative for adenopathy. Bruises/bleeds easily.   Psychiatric/Behavioral: Negative for confusion, dysphoric mood and sleep disturbance. The patient is not nervous/anxious.    Pt's previous ROS reviewed and updated as indicated.       Objective    Vitals:    08/08/22 1546   BP: 122/70   Pulse: 86   SpO2: 96%     Body mass index is 23.33 kg/m².      08/08/22  1546   Weight: 78 kg (172 lb)       Physical Exam  Vitals and nursing note reviewed.   Constitutional:       General: He is not in acute distress.     Appearance: He is well-developed, well-groomed and normal weight. He is not ill-appearing.      Comments: Appears older than stated age   HENT:      Head: Atraumatic.   Eyes:      General: No scleral icterus.     Conjunctiva/sclera: Conjunctivae normal.   Neck:      Thyroid: No thyroid mass.      Vascular: Normal carotid pulses. No carotid bruit.   Cardiovascular:      Rate and Rhythm: Normal rate and regular rhythm.      Pulses: Decreased pulses (pedal).      Heart sounds: Heart sounds are distant.   Pulmonary:      Effort: Pulmonary effort is normal.      Breath sounds: Decreased breath sounds and wheezing present. No rhonchi or rales.   Musculoskeletal:      Right lower leg: No edema.      Left lower leg: No edema.   Lymphadenopathy:      Cervical: No cervical adenopathy.   Skin:     General: Skin is warm and  dry.      Coloration: Skin is not jaundiced or pale.      Findings: No rash.   Neurological:      Mental Status: He is alert and oriented to person, place, and time.      Motor: No tremor.      Gait: Gait is intact.   Psychiatric:         Mood and Affect: Mood and affect normal.         Behavior: Behavior normal. Behavior is cooperative.         Cognition and Memory: Cognition normal.     Pt's previous physical exam reviewed and updated as indicated.    Lab Results   Component Value Date    TSH 0.843 11/29/2021     Lab Results   Component Value Date    HGBA1C 5.6 03/28/2022         Assessment & Plan   Diagnoses and all orders for this visit:    1. Essential hypertension (Primary)  -     CBC & Differential  -     Comprehensive Metabolic Panel  -     carvedilol (COREG) 12.5 MG tablet; Take 1 tablet by mouth 2 (Two) Times a Day With Meals.  Dispense: 60 tablet; Refill: 5    2. Paroxysmal SVT (supraventricular tachycardia) (HCC)  -     CBC & Differential  -     Comprehensive Metabolic Panel  -     carvedilol (COREG) 12.5 MG tablet; Take 1 tablet by mouth 2 (Two) Times a Day With Meals.  Dispense: 60 tablet; Refill: 5    3. Mixed hyperlipidemia  -     Comprehensive Metabolic Panel  -     Lipid Panel  -     atorvastatin (LIPITOR) 40 MG tablet; Take 1 tablet by mouth Daily.  Dispense: 30 tablet; Refill: 5    4. Vitamin B 12 deficiency  -     Vitamin B12    5. Vitamin D deficiency  -     Vitamin D 25 Hydroxy    6. Uncontrolled hypertension  -     valsartan (Diovan) 80 MG tablet; Take 1 tablet by mouth Daily.  Dispense: 30 tablet; Refill: 5    7. Chronic obstructive pulmonary disease, unspecified COPD type (HCC)  -     aclidinium bromide (Tudorza Pressair) 400 MCG/ACT aerosol powder  powder for inhalation; Inhale 1 puff 2 (Two) Times a Day.  Dispense: 1 each; Refill: 5    8. Centrilobular emphysema (HCC)  -     albuterol sulfate HFA (ProAir HFA) 108 (90 Base) MCG/ACT inhaler; Inhale 2 puffs Every 4 (Four) Hours As Needed for  Wheezing or Shortness of Air.  Dispense: 16 g; Refill: 5    9. COVID-19 vaccination declined       Hypertension controlled, continue carvedilol, valsartan.  Continue home BP monitoring.  Lifestyle modification for management of hypertension reviewed.    COPD/emphysema.  Generally stable.  Continue Tudorza, Breo, butyryl as needed and nocturnal oxygen supplementation. Tobacco cessation advised.  Pt voiced understanding of health risks of cont'd tobacco use.  Encouraged follow-up with pulmonology per routine.    Chronic pain due to lumbar degenerative disc disease doing well on Mobic.    Assess status of vit/min deficiencies and replace as indicated.  Continue B12 and vitamin D supplementation.    Continue statin for management of hyperlipidemia.    Continue carvedilol for management of SVT.  Encouraged follow-up with cardiology per routine.    Routine follow-up in 3 months, sooner as needed/instructed.  Records requested from [facility] regarding chest pain visit.  I will contact patient regarding test results and provide instructions regarding any necessary changes in plan of care.  Patient was encouraged to keep me informed of any acute changes or any new concerning symptoms.  Pt is aware of reasons to seek emergent care.  Patient voiced understanding of all instructions and denied further questions.    Please note that portions of this note may have been completed with a voice recognition program. Efforts were made to edit the dictations, but occasionally words are mistranscribed.

## 2022-08-09 LAB
25(OH)D3+25(OH)D2 SERPL-MCNC: 61.9 NG/ML (ref 30–100)
ALBUMIN SERPL-MCNC: 4.7 G/DL (ref 3.8–4.9)
ALBUMIN/GLOB SERPL: 2.2 {RATIO} (ref 1.2–2.2)
ALP SERPL-CCNC: 124 IU/L (ref 44–121)
ALT SERPL-CCNC: 12 IU/L (ref 0–44)
AST SERPL-CCNC: 17 IU/L (ref 0–40)
BASOPHILS # BLD AUTO: 0.1 X10E3/UL (ref 0–0.2)
BASOPHILS NFR BLD AUTO: 1 %
BILIRUB SERPL-MCNC: 0.8 MG/DL (ref 0–1.2)
BUN SERPL-MCNC: 14 MG/DL (ref 6–24)
BUN/CREAT SERPL: 14 (ref 9–20)
CALCIUM SERPL-MCNC: 8.9 MG/DL (ref 8.7–10.2)
CHLORIDE SERPL-SCNC: 102 MMOL/L (ref 96–106)
CHOLEST SERPL-MCNC: 103 MG/DL (ref 100–199)
CO2 SERPL-SCNC: 23 MMOL/L (ref 20–29)
CREAT SERPL-MCNC: 0.97 MG/DL (ref 0.76–1.27)
EGFRCR SERPLBLD CKD-EPI 2021: 90 ML/MIN/1.73
EOSINOPHIL # BLD AUTO: 0.1 X10E3/UL (ref 0–0.4)
EOSINOPHIL NFR BLD AUTO: 1 %
ERYTHROCYTE [DISTWIDTH] IN BLOOD BY AUTOMATED COUNT: 13.3 % (ref 11.6–15.4)
GLOBULIN SER CALC-MCNC: 2.1 G/DL (ref 1.5–4.5)
GLUCOSE SERPL-MCNC: 88 MG/DL (ref 65–99)
HCT VFR BLD AUTO: 44.2 % (ref 37.5–51)
HDLC SERPL-MCNC: 37 MG/DL
HGB BLD-MCNC: 15.4 G/DL (ref 13–17.7)
IMM GRANULOCYTES # BLD AUTO: 0 X10E3/UL (ref 0–0.1)
IMM GRANULOCYTES NFR BLD AUTO: 0 %
LDLC SERPL CALC-MCNC: 45 MG/DL (ref 0–99)
LYMPHOCYTES # BLD AUTO: 1.9 X10E3/UL (ref 0.7–3.1)
LYMPHOCYTES NFR BLD AUTO: 23 %
MCH RBC QN AUTO: 31 PG (ref 26.6–33)
MCHC RBC AUTO-ENTMCNC: 34.8 G/DL (ref 31.5–35.7)
MCV RBC AUTO: 89 FL (ref 79–97)
MONOCYTES # BLD AUTO: 0.5 X10E3/UL (ref 0.1–0.9)
MONOCYTES NFR BLD AUTO: 6 %
NEUTROPHILS # BLD AUTO: 5.4 X10E3/UL (ref 1.4–7)
NEUTROPHILS NFR BLD AUTO: 69 %
PLATELET # BLD AUTO: 196 X10E3/UL (ref 150–450)
POTASSIUM SERPL-SCNC: 4.4 MMOL/L (ref 3.5–5.2)
PROT SERPL-MCNC: 6.8 G/DL (ref 6–8.5)
RBC # BLD AUTO: 4.97 X10E6/UL (ref 4.14–5.8)
SODIUM SERPL-SCNC: 141 MMOL/L (ref 134–144)
TRIGL SERPL-MCNC: 111 MG/DL (ref 0–149)
VIT B12 SERPL-MCNC: 461 PG/ML (ref 232–1245)
VLDLC SERPL CALC-MCNC: 21 MG/DL (ref 5–40)
WBC # BLD AUTO: 7.9 X10E3/UL (ref 3.4–10.8)

## 2022-09-27 DIAGNOSIS — I10 UNCONTROLLED HYPERTENSION: ICD-10-CM

## 2022-09-27 RX ORDER — VALSARTAN 80 MG/1
80 TABLET ORAL DAILY
Qty: 30 TABLET | Refills: 4 | Status: SHIPPED | OUTPATIENT
Start: 2022-09-27

## 2022-11-04 RX ORDER — PANTOPRAZOLE SODIUM 40 MG/1
40 TABLET, DELAYED RELEASE ORAL DAILY
Qty: 30 TABLET | Refills: 4 | Status: SHIPPED | OUTPATIENT
Start: 2022-11-04

## 2022-11-09 DIAGNOSIS — G25.81 RLS (RESTLESS LEGS SYNDROME): ICD-10-CM

## 2022-11-09 RX ORDER — ROPINIROLE 0.25 MG/1
TABLET, FILM COATED ORAL
Qty: 90 TABLET | Refills: 1 | Status: SHIPPED | OUTPATIENT
Start: 2022-11-09 | End: 2023-03-06

## 2022-11-10 DIAGNOSIS — E55.9 VITAMIN D DEFICIENCY: ICD-10-CM

## 2022-11-10 RX ORDER — ERGOCALCIFEROL 1.25 MG/1
50000 CAPSULE ORAL WEEKLY
Qty: 5 CAPSULE | Refills: 4 | Status: SHIPPED | OUTPATIENT
Start: 2022-11-10

## 2022-11-15 ENCOUNTER — OFFICE VISIT (OUTPATIENT)
Dept: FAMILY MEDICINE CLINIC | Facility: CLINIC | Age: 62
End: 2022-11-15

## 2022-11-15 VITALS
BODY MASS INDEX: 23.43 KG/M2 | HEART RATE: 72 BPM | SYSTOLIC BLOOD PRESSURE: 130 MMHG | OXYGEN SATURATION: 96 % | TEMPERATURE: 98.1 F | WEIGHT: 173 LBS | DIASTOLIC BLOOD PRESSURE: 80 MMHG | HEIGHT: 72 IN

## 2022-11-15 DIAGNOSIS — I10 ESSENTIAL HYPERTENSION: ICD-10-CM

## 2022-11-15 DIAGNOSIS — Z28.21 COVID-19 VACCINATION DECLINED: ICD-10-CM

## 2022-11-15 DIAGNOSIS — E78.2 MIXED HYPERLIPIDEMIA: ICD-10-CM

## 2022-11-15 DIAGNOSIS — Z99.81 DEPENDENCE ON NOCTURNAL OXYGEN THERAPY: ICD-10-CM

## 2022-11-15 DIAGNOSIS — Z23 NEED FOR IMMUNIZATION AGAINST INFLUENZA: ICD-10-CM

## 2022-11-15 DIAGNOSIS — F17.200 TOBACCO USE DISORDER: ICD-10-CM

## 2022-11-15 DIAGNOSIS — I47.1 PAROXYSMAL SVT (SUPRAVENTRICULAR TACHYCARDIA): ICD-10-CM

## 2022-11-15 DIAGNOSIS — J44.1 COPD WITH EXACERBATION: Primary | ICD-10-CM

## 2022-11-15 DIAGNOSIS — Z87.891 PERSONAL HISTORY OF SMOKING: ICD-10-CM

## 2022-11-15 DIAGNOSIS — J44.9 CHRONIC OBSTRUCTIVE PULMONARY DISEASE, UNSPECIFIED COPD TYPE: ICD-10-CM

## 2022-11-15 DIAGNOSIS — I45.6 WOLFF-PARKINSON-WHITE SYNDROME: ICD-10-CM

## 2022-11-15 DIAGNOSIS — I27.20 PULMONARY HYPERTENSION: ICD-10-CM

## 2022-11-15 PROCEDURE — 90686 IIV4 VACC NO PRSV 0.5 ML IM: CPT | Performed by: FAMILY MEDICINE

## 2022-11-15 PROCEDURE — 90471 IMMUNIZATION ADMIN: CPT | Performed by: FAMILY MEDICINE

## 2022-11-15 PROCEDURE — 99214 OFFICE O/P EST MOD 30 MIN: CPT | Performed by: FAMILY MEDICINE

## 2022-11-15 PROCEDURE — 96372 THER/PROPH/DIAG INJ SC/IM: CPT | Performed by: FAMILY MEDICINE

## 2022-11-15 RX ORDER — ALBUTEROL SULFATE 2.5 MG/3ML
2.5 SOLUTION RESPIRATORY (INHALATION) EVERY 4 HOURS PRN
Qty: 252 ML | Refills: 5 | Status: SHIPPED | OUTPATIENT
Start: 2022-11-15

## 2022-11-15 RX ORDER — DEXAMETHASONE SODIUM PHOSPHATE 4 MG/ML
4 INJECTION, SOLUTION INTRA-ARTICULAR; INTRALESIONAL; INTRAMUSCULAR; INTRAVENOUS; SOFT TISSUE ONCE
Status: COMPLETED | OUTPATIENT
Start: 2022-11-15 | End: 2022-11-15

## 2022-11-15 RX ORDER — PREDNISONE 20 MG/1
20 TABLET ORAL DAILY
Qty: 14 TABLET | Refills: 0 | Status: SHIPPED | OUTPATIENT
Start: 2022-11-15 | End: 2022-11-22

## 2022-11-15 RX ORDER — AZITHROMYCIN 250 MG/1
TABLET, FILM COATED ORAL
Qty: 6 TABLET | Refills: 0 | Status: SHIPPED | OUTPATIENT
Start: 2022-11-15 | End: 2022-12-30

## 2022-11-15 RX ORDER — CEFTRIAXONE 1 G/1
1 INJECTION, POWDER, FOR SOLUTION INTRAMUSCULAR; INTRAVENOUS ONCE
Status: COMPLETED | OUTPATIENT
Start: 2022-11-15 | End: 2022-11-15

## 2022-11-15 RX ADMIN — DEXAMETHASONE SODIUM PHOSPHATE 4 MG: 4 INJECTION, SOLUTION INTRA-ARTICULAR; INTRALESIONAL; INTRAMUSCULAR; INTRAVENOUS; SOFT TISSUE at 08:34

## 2022-11-15 RX ADMIN — CEFTRIAXONE 1 G: 1 INJECTION, POWDER, FOR SOLUTION INTRAMUSCULAR; INTRAVENOUS at 08:34

## 2022-11-15 NOTE — PROGRESS NOTES
Subjective   Gaston Bagley is a 62 y.o. male.     History of Present Illness   Mr. Bagley presents today for routine follow up on multiple chronic medical problems.     He has Parkinson White, paroxysmal SVT-previously followed by Dr. FOOTE, cardiology at [facility].  Has not had recent follow-up. Denies change in exercise tolerance other than related to COPD. No chest pain or apparent anginal equivalents.   Denies recent ER visit.    He is on nocturnal oxygen for COPD. Does have nebulizer on hand with albuterol and has been using as needed. He is down to smoking 1/2 ppd.  Working on further smoking cessation. No abx since last visit.  Apparently never scheduled follow-up with pulmonology.  He complains of worsening cough prod of yellow sputum, chest congestion, wheeze and dyspnea on exertion.  No fever, chest pain or hemoptysis.  Weight stable.     Vitamin D deficient, taking vitamin D 5000 every other day     Taking his valsartan for management of hypertension.     On atorvastatin for dyslipidemia.  Tolerating well.     Pt's previous HPI reviewed and updated as indicated.       The following portions of the patient's history were reviewed and updated as appropriate: allergies, current medications, past family history, past medical history, past social history, past surgical history and problem list.    Review of Systems   Constitutional: Positive for fatigue. Negative for fever.   HENT: Negative for congestion, ear pain, hearing loss, nosebleeds, rhinorrhea, sneezing, sore throat, tinnitus and trouble swallowing.    Eyes: Negative for pain, discharge, redness and visual disturbance.   Respiratory: Positive for cough, shortness of breath and wheezing. Negative for chest tightness.    Cardiovascular: Positive for palpitations. Negative for chest pain and leg swelling.   Gastrointestinal: Negative for abdominal pain, blood in stool, constipation, diarrhea, nausea and vomiting.   Endocrine: Negative for cold  intolerance, heat intolerance, polydipsia and polyuria.        Night sweats   Genitourinary: Negative for dysuria, flank pain, frequency, hematuria and urgency.   Musculoskeletal: Positive for arthralgias, back pain and myalgias. Negative for joint swelling and neck pain.   Skin: Negative for rash and wound.   Neurological: Positive for weakness (generalized). Negative for dizziness, tremors, seizures, syncope, speech difficulty, numbness and headaches.   Hematological: Negative for adenopathy. Bruises/bleeds easily.   Psychiatric/Behavioral: Negative for confusion, dysphoric mood and sleep disturbance. The patient is not nervous/anxious.    Pt's previous ROS reviewed and updated as indicated.     Objective    Vitals:    11/15/22 0757   BP: 130/80   Pulse: 72   Temp: 98.1 °F (36.7 °C)   SpO2: 96%     Body mass index is 23.46 kg/m².      11/15/22  0757   Weight: 78.5 kg (173 lb)       Physical Exam  Vitals and nursing note reviewed.   Constitutional:       General: He is not in acute distress.     Appearance: He is well-developed, well-groomed and normal weight. He is ill-appearing (mildly).      Comments: Appears older than stated age   HENT:      Head: Atraumatic.      Mouth/Throat:      Mouth: Mucous membranes are moist.   Eyes:      General: No scleral icterus.     Conjunctiva/sclera: Conjunctivae normal.   Neck:      Thyroid: No thyroid mass.      Vascular: Normal carotid pulses. No carotid bruit.   Cardiovascular:      Rate and Rhythm: Normal rate and regular rhythm.      Pulses: Decreased pulses (pedal).      Heart sounds: Heart sounds are distant.   Pulmonary:      Effort: Pulmonary effort is normal.      Breath sounds: Decreased breath sounds, wheezing and rhonchi present. No rales.   Musculoskeletal:      Right lower leg: No edema.      Left lower leg: No edema.   Lymphadenopathy:      Cervical: No cervical adenopathy.      Upper Body:      Right upper body: No supraclavicular adenopathy.      Left upper  body: No supraclavicular adenopathy.   Skin:     General: Skin is warm and dry.      Coloration: Skin is not cyanotic, jaundiced or pale.      Findings: No rash.      Nails: There is clubbing.   Neurological:      Mental Status: He is alert and oriented to person, place, and time.      Gait: Gait is intact.   Psychiatric:         Mood and Affect: Mood normal. Affect is flat.         Behavior: Behavior normal. Behavior is cooperative.         Cognition and Memory: Cognition normal.     Pt's previous physical exam reviewed and updated as indicated.    Lab Results   Component Value Date    WBC 7.9 08/08/2022    HGB 15.4 08/08/2022    HCT 44.2 08/08/2022    MCV 89 08/08/2022     08/08/2022       Lab Results   Component Value Date    GLUCOSE 88 08/08/2022    BUN 14 08/08/2022    CREATININE 0.97 08/08/2022    EGFRIFNONA 130 11/29/2021    EGFRIFAFRI >150 11/29/2021    BCR 14 08/08/2022    K 4.4 08/08/2022    CO2 23 08/08/2022    CALCIUM 8.9 08/08/2022    PROTENTOTREF 6.8 08/08/2022    ALBUMIN 4.7 08/08/2022    LABIL2 2.2 08/08/2022    AST 17 08/08/2022    ALT 12 08/08/2022       Lab Results   Component Value Date    CHLPL 103 08/08/2022    TRIG 111 08/08/2022    HDL 37 (L) 08/08/2022    LDL 45 08/08/2022       Lab Results   Component Value Date    HGBA1C 5.6 03/28/2022       Lab Results   Component Value Date    TSH 0.843 11/29/2021           Assessment & Plan   Diagnoses and all orders for this visit:    1. COPD with exacerbation (HCC) (Primary)  -     dexamethasone (DECADRON) injection 4 mg  -     azithromycin (Zithromax Z-Alban) 250 MG tablet; Take 2 tablets the first day, then 1 tablet daily for 4 days.  Dispense: 6 tablet; Refill: 0  -     predniSONE (DELTASONE) 20 MG tablet; Take 1 tablet by mouth Daily for 7 days. START Wednesday 11/16/22  Dispense: 14 tablet; Refill: 0    2. Need for immunization against influenza  -     FluLaval/Fluzone >6 mos ([id number])  -     cefTRIAXone (ROCEPHIN) injection 1 g    3.  Kalyani-Parkinson-White syndrome  -     Ambulatory Referral to Cardiology    4. Paroxysmal SVT (supraventricular tachycardia) (HCC)  -     Ambulatory Referral to Cardiology    5. Chronic obstructive pulmonary disease, unspecified COPD type (HCC)  -     Ambulatory Referral to Pulmonology    6. Personal history of smoking  -     CT Chest Low Dose Wo; Future    7. COVID-19 vaccination declined    8. Tobacco use disorder    9. Dependence on nocturnal oxygen therapy    10. Pulmonary hypertension (HCC)    11. Mixed hyperlipidemia    12. Essential hypertension    Other orders  -     albuterol (PROVENTIL) (2.5 MG/3ML) 0.083% nebulizer solution; Take 2.5 mg by nebulization Every 4 (Four) Hours As Needed for Wheezing.  Dispense: 252 mL; Refill: 5       Refer to pulmonology for routine follow-up as symptoms not ideally controlled.  Treatment for exacerbation as above.  Tobacco cessation advised.  Pt voiced understanding of health risks of cont'd tobacco use.    Encouraged follow-up with cardiology for management of his SVT, WPW.  We will assist in obtaining follow-up locally.    Hypertension -continue valsartan, carvedilol    Hyperlipidemia -continue atorvastatin    Routine follow-up in 3 months, sooner as needed/instructed.  I will contact patient regarding test results and provide instructions regarding any necessary changes in plan of care.  Patient was encouraged to keep me informed of any acute changes, lack of improvement, or any new concerning symptoms.  Pt is aware of reasons to seek emergent care.  Patient voiced understanding of all instructions and denied further questions.    Please note that portions of this note may have been completed with a voice recognition program.               NOTE TO PATIENT: The 21st Century Cures Act makes medical notes like these available to patients in the interest of transparency. However, be advised this is a medical document. It is intended as peer to peer communication. It is  written in medical language and may contain abbreviations or verbiage that are unfamiliar. It may appear blunt or direct. Medical documents are intended to carry relevant information, facts as evident, and the clinical opinion of the practitioner.

## 2022-11-18 PROBLEM — Z28.21 COVID-19 VACCINATION DECLINED: Status: ACTIVE | Noted: 2022-11-18

## 2022-12-14 ENCOUNTER — TELEPHONE (OUTPATIENT)
Dept: FAMILY MEDICINE CLINIC | Facility: CLINIC | Age: 62
End: 2022-12-14

## 2022-12-14 NOTE — TELEPHONE ENCOUNTER
Caller: CARLOS POLK    Relationship: Emergency Contact    Best call back number: [phone]    What is the medical concern/diagnosis: COPD AND HEART ISSUES    What specialty or service is being requested: PULMONOLOGY AND CARDIOLOGY  What is the provider, practice or medical service name:  ANY  LOCATION EVEN [place] IF SOONER    Any additional details: WANTS SEEN SOONER THAN June 2023

## 2022-12-14 NOTE — TELEPHONE ENCOUNTER
I called and spoke with patient spouse. Clarified that he has two appointments scheduled in January with [facility] (she is fine with those appointment dates). She also stated that she was told first available with Dr Fontenot in [place] was in June (which he is currently scheduled for). She said they would be OK with going to [place] office and seeing first available provider for pulmonary. I attempted to call the office to discuss getting him an earlier appointment, but could not reach a person in the office. I made a notation on his referral and routed it back to scheduling review for an earlier appointment to be made with pulmonary at first available office.

## 2022-12-14 NOTE — TELEPHONE ENCOUNTER
Please contact pt and clarify if he already has apts and just needs them sooner.    He was referred to Dr. Fontenot in November and a [facility] provider for EP cardiology in Nov as well.

## 2022-12-15 ENCOUNTER — OFFICE VISIT (OUTPATIENT)
Dept: FAMILY MEDICINE CLINIC | Facility: CLINIC | Age: 62
End: 2022-12-15

## 2022-12-15 VITALS
SYSTOLIC BLOOD PRESSURE: 138 MMHG | HEART RATE: 68 BPM | BODY MASS INDEX: 23.78 KG/M2 | HEIGHT: 72 IN | TEMPERATURE: 97.1 F | OXYGEN SATURATION: 95 % | RESPIRATION RATE: 16 BRPM | DIASTOLIC BLOOD PRESSURE: 84 MMHG | WEIGHT: 175.6 LBS

## 2022-12-15 DIAGNOSIS — F17.200 TOBACCO USE DISORDER: ICD-10-CM

## 2022-12-15 DIAGNOSIS — I10 ESSENTIAL HYPERTENSION: ICD-10-CM

## 2022-12-15 DIAGNOSIS — J44.1 COPD WITH EXACERBATION: Primary | ICD-10-CM

## 2022-12-15 DIAGNOSIS — R06.03 ACUTE RESPIRATORY DISTRESS: ICD-10-CM

## 2022-12-15 PROCEDURE — 99214 OFFICE O/P EST MOD 30 MIN: CPT | Performed by: NURSE PRACTITIONER

## 2022-12-15 PROCEDURE — 96372 THER/PROPH/DIAG INJ SC/IM: CPT | Performed by: NURSE PRACTITIONER

## 2022-12-15 RX ORDER — IPRATROPIUM BROMIDE AND ALBUTEROL SULFATE 2.5; .5 MG/3ML; MG/3ML
3 SOLUTION RESPIRATORY (INHALATION)
Status: DISCONTINUED | OUTPATIENT
Start: 2022-12-15 | End: 2022-12-30

## 2022-12-15 RX ORDER — METHYLPREDNISOLONE SODIUM SUCCINATE 125 MG/2ML
125 INJECTION, POWDER, LYOPHILIZED, FOR SOLUTION INTRAMUSCULAR; INTRAVENOUS EVERY 6 HOURS
Status: DISCONTINUED | OUTPATIENT
Start: 2022-12-15 | End: 2022-12-30

## 2022-12-15 RX ORDER — METHYLPREDNISOLONE SODIUM SUCCINATE 125 MG/2ML
125 INJECTION, POWDER, LYOPHILIZED, FOR SOLUTION INTRAMUSCULAR; INTRAVENOUS EVERY 6 HOURS
Status: DISCONTINUED | OUTPATIENT
Start: 2022-12-15 | End: 2022-12-15

## 2022-12-15 RX ORDER — DEXTROMETHORPHAN HYDROBROMIDE AND PROMETHAZINE HYDROCHLORIDE 15; 6.25 MG/5ML; MG/5ML
5 SYRUP ORAL 4 TIMES DAILY PRN
Qty: 118 ML | Refills: 0 | Status: SHIPPED | OUTPATIENT
Start: 2022-12-15

## 2022-12-15 RX ORDER — PREDNISONE 10 MG/1
TABLET ORAL
Qty: 15 TABLET | Refills: 0 | Status: SHIPPED | OUTPATIENT
Start: 2022-12-15 | End: 2022-12-30

## 2022-12-15 RX ORDER — CEFDINIR 300 MG/1
300 CAPSULE ORAL 2 TIMES DAILY
Qty: 14 CAPSULE | Refills: 0 | Status: SHIPPED | OUTPATIENT
Start: 2022-12-15 | End: 2022-12-22

## 2022-12-15 RX ORDER — IPRATROPIUM BROMIDE AND ALBUTEROL SULFATE 2.5; .5 MG/3ML; MG/3ML
3 SOLUTION RESPIRATORY (INHALATION) EVERY 4 HOURS PRN
Qty: 360 ML | Refills: 0 | Status: SHIPPED | OUTPATIENT
Start: 2022-12-15

## 2022-12-15 RX ADMIN — METHYLPREDNISOLONE SODIUM SUCCINATE 125 MG: 125 INJECTION, POWDER, LYOPHILIZED, FOR SOLUTION INTRAMUSCULAR; INTRAVENOUS at 14:45

## 2022-12-15 NOTE — PROGRESS NOTES
"                      Established Patient        Chief Complaint:   Chief Complaint   Patient presents with   • COPD         History of Present Illness:    Gaston Bagley is a 62 y.o. male who presents today for complaints of SOA, wheezing, productive cough. Patient was seen at [facility] on Monday. Was given steroid injection as well as azithromycin and tessalon perles for cough. Patient states that his symptoms have not improved at all. Feels like he cannot breathe, has been experiencing more frequent COPD exacerbations lately. Still smoking 1/2 PPD cigarettes.     Subjective     The following portions of the patient's history were reviewed and updated as appropriate: allergies, current medications, past family history, past medical history, past social history, past surgical history and problem list.    ALLERGIES  No Known Allergies    ROS  Review of Systems   Constitutional: Positive for fatigue. Negative for fever.   HENT: Negative for congestion, sinus pressure, sinus pain and sore throat.    Respiratory: Positive for cough, chest tightness, shortness of breath and wheezing.    Cardiovascular: Negative for chest pain and palpitations.   Gastrointestinal: Negative for diarrhea, nausea and vomiting.   Neurological: Positive for weakness. Negative for dizziness and headaches.   All other systems reviewed and are negative.      Objective     Vital Signs:   /84   Pulse 68   Temp 97.1 °F (36.2 °C)   Resp 16   Ht 182.9 cm (72\")   Wt 79.7 kg (175 lb 9.6 oz)   SpO2 95%   BMI 23.82 kg/m²     Physical Exam   Physical Exam  Vitals and nursing note reviewed.   Constitutional:       Appearance: He is ill-appearing.   HENT:      Head: Normocephalic.      Nose: Nose normal.      Mouth/Throat:      Lips: Pink.   Eyes:      General: Lids are normal.      Conjunctiva/sclera: Conjunctivae normal.      Pupils: Pupils are equal, round, and reactive to light.   Cardiovascular:      Rate and Rhythm: Normal rate.      " Heart sounds: Normal heart sounds.   Pulmonary:      Effort: Tachypnea, accessory muscle usage, prolonged expiration and respiratory distress present. No retractions.      Breath sounds: Stridor and decreased air movement present. Decreased breath sounds and wheezing present. No rhonchi.      Comments: Increased air movement post Solumedrol and DuoNeb, still wheezing bilaterally  Abdominal:      General: Bowel sounds are normal.   Musculoskeletal:         General: Normal range of motion.   Skin:     General: Skin is warm and dry.      Capillary Refill: Capillary refill takes less than 2 seconds.   Neurological:      Mental Status: He is alert and oriented to person, place, and time.      Gait: Gait is intact.   Psychiatric:         Attention and Perception: Attention normal.         Mood and Affect: Mood and affect normal.         Speech: Speech normal.         Behavior: Behavior normal. Behavior is cooperative.       Assessment and Plan      Assessment/Plan:   Diagnoses and all orders for this visit:    1. COPD with exacerbation (HCC) (Primary)  -     ipratropium-albuterol (DUO-NEB) nebulizer solution 3 mL  -     predniSONE (DELTASONE) 10 MG tablet; Take 5 tablets today, decrease dose by 1 tablet each day until gone. 5,4,3,2,1  Dispense: 15 tablet; Refill: 0  -     ipratropium-albuterol (DUO-NEB) 0.5-2.5 mg/3 ml nebulizer; Take 3 mL by nebulization Every 4 (Four) Hours As Needed for Wheezing.  Dispense: 360 mL; Refill: 0  -     methylPREDNISolone sodium succinate (SOLU-Medrol) injection 125 mg  -     promethazine-dextromethorphan (PROMETHAZINE-DM) 6.25-15 MG/5ML syrup; Take 5 mL by mouth 4 (Four) Times a Day As Needed for Cough.  Dispense: 118 mL; Refill: 0  -     cefdinir (OMNICEF) 300 MG capsule; Take 1 capsule by mouth 2 (Two) Times a Day for 7 days.  Dispense: 14 capsule; Refill: 0    2. Acute respiratory distress  -     ipratropium-albuterol (DUO-NEB) nebulizer solution 3 mL  -     predniSONE (DELTASONE) 10 MG  "tablet; Take 5 tablets today, decrease dose by 1 tablet each day until gone. 5,4,3,2,1  Dispense: 15 tablet; Refill: 0  -     ipratropium-albuterol (DUO-NEB) 0.5-2.5 mg/3 ml nebulizer; Take 3 mL by nebulization Every 4 (Four) Hours As Needed for Wheezing.  Dispense: 360 mL; Refill: 0  -     methylPREDNISolone sodium succinate (SOLU-Medrol) injection 125 mg    3. Tobacco use disorder    4. Essential hypertension    Other orders  -     Discontinue: methylPREDNISolone sodium succinate (SOLU-Medrol) injection 125 mg    --Solumedrol 125mg IM and DuoNeb breathing treatment administered in clinic. Patient reports that he feels like his breathing has improved some, feels less SOA, still wheezing but \"better\"  --Explained to patient that if symptoms worsen at all, he is to present to the ED immediately, patient and wife verbalize understanding and are agreeable to the plan of care    Discussion Summary:  Discussed plan of care in detail with pt today; pt verb understanding and agrees.    Follow up:  Return for Next scheduled follow up.     Patient Education:  Patient Instructions   --Prednisone taper to be started tomorrow  --DuoNeb breathing treatments to replace regular Albuterol nebs for now  --Patient to present to ED immediately for worsening symptoms      Jessica Finley, APRN  12/15/22  15:09 EST          Please note that portions of this note may have been completed with a voice recognition program.   "

## 2022-12-15 NOTE — PATIENT INSTRUCTIONS
--Prednisone taper to be started tomorrow  --DuoNeb breathing treatments to replace regular Albuterol nebs for now  --Patient to present to ED immediately for worsening symptoms

## 2022-12-20 ENCOUNTER — APPOINTMENT (OUTPATIENT)
Dept: CT IMAGING | Facility: HOSPITAL | Age: 62
End: 2022-12-20

## 2022-12-30 ENCOUNTER — OFFICE VISIT (OUTPATIENT)
Dept: PULMONOLOGY | Facility: CLINIC | Age: 62
End: 2022-12-30
Payer: COMMERCIAL

## 2022-12-30 VITALS
OXYGEN SATURATION: 95 % | SYSTOLIC BLOOD PRESSURE: 120 MMHG | TEMPERATURE: 97.1 F | HEIGHT: 72 IN | DIASTOLIC BLOOD PRESSURE: 80 MMHG | HEART RATE: 67 BPM | RESPIRATION RATE: 14 BRPM | BODY MASS INDEX: 22.89 KG/M2 | WEIGHT: 169 LBS

## 2022-12-30 DIAGNOSIS — Z87.891 PERSONAL HISTORY OF NICOTINE DEPENDENCE: ICD-10-CM

## 2022-12-30 DIAGNOSIS — J44.9 ASTHMA-COPD OVERLAP SYNDROME: Primary | ICD-10-CM

## 2022-12-30 PROCEDURE — 99214 OFFICE O/P EST MOD 30 MIN: CPT | Performed by: NURSE PRACTITIONER

## 2022-12-30 PROCEDURE — 94618 PULMONARY STRESS TESTING: CPT | Performed by: NURSE PRACTITIONER

## 2022-12-30 PROCEDURE — 95012 NITRIC OXIDE EXP GAS DETER: CPT | Performed by: NURSE PRACTITIONER

## 2022-12-30 NOTE — PROGRESS NOTES
Follow Up Office Visit      Patient Name: Gaston Bagley    Chief Complaint:    Chief Complaint   Patient presents with   • Breathing Problem     followup       History of Present Illness: Gaston Bagley is a 62 y.o. male who is here today for follow up of COPD. He was last seen in this clinic in February 2022. He has since required exacerbation treatment last month and again earlier this month in his primary care office. He notes that his PCP has ordered a repeat LDCT chest for him. He is on Breo and albuterol. Never started Incruse. He requires SHAR use daily.    Timing: Daily  Associated Symptoms: Exertional dyspnea, chronic cough with clear/milky sputum production, no fevers, no hemoptysis  Modifying Factors: Moderate exertion, cold air  Supplemental Oxygen: 2L NC at nighttime, DME is [employer]  Cardiac history: WPW controlled on medication. Following with cardiology.  Formerly smoked up to 3 ppd.    Subjective      Review of Systems:  Review of Systems   Constitutional: Negative for fever and unexpected weight change.   Respiratory: Positive for cough and shortness of breath. Negative for wheezing.    Cardiovascular: Negative for chest pain and leg swelling.   Allergic/Immunologic: Negative for environmental allergies.        Past Medical History:   Past Medical History:   Diagnosis Date   • Arthritis    • COPD (chronic obstructive pulmonary disease) (HCC)    • Hyperlipidemia    • Hypertension    • Right cornea abrasion    • WPW (Kalyani-Parkinson-White syndrome)        Past Surgical History:   Past Surgical History:   Procedure Laterality Date   • BACK SURGERY  2018    L4-L5 lumbar fusion Dr. Carlos Enrique Moore   • CARDIAC CATHETERIZATION  01/28/2020   • FACIAL RECONSTRUCTION SURGERY     • SPINAL CORD STIMULATOR IMPLANT N/A 5/20/2021    Procedure: SPINAL CORD STIMULATOR INSERTION PHASE 2;  Surgeon: Edson Figueroa DO;  Location: [facility];  Service: Pain Management;  Laterality: N/A;   • SPINAL CORD STIMULATOR  REMOVAL N/A 7/26/2022    Procedure: SPINAL CORD STIMULATOR REMOVAL;  Surgeon: Edson Figueroa DO;  Location: [facility] OR;  Service: Pain Management;  Laterality: N/A;       Family History:   Family History   Problem Relation Age of Onset   • Heart attack Mother 62   • Stroke Mother 75   • Arthritis Father    • Arthritis Brother    • Heart attack Brother 58   • Hypertension Brother    • Arthritis Brother    • Arthritis Brother    • Hypertension Brother    • Throat cancer Brother    • Arthritis Brother    • Hypertension Brother    • Arthritis Brother    • Stroke Brother 45       Social History:   Social History     Socioeconomic History   • Marital status:    Tobacco Use   • Smoking status: Every Day     Packs/day: 0.50 currently, 3 ppd in the past     Years: 43.00           Types: Cigarettes     Start date: 1977   • Smokeless tobacco: Never   • Tobacco comments:     still smokes about half pack a day    Vaping Use   • Vaping Use: Never used   Substance and Sexual Activity   • Alcohol use: Never   • Drug use: Never   • Sexual activity: Defer       Current Medications:     Current Outpatient Medications:   •  acetaminophen (TYLENOL) 500 MG tablet, Take 500 mg by mouth Every 4 (Four) Hours As Needed., Disp: , Rfl:   •  albuterol (PROVENTIL) (2.5 MG/3ML) 0.083% nebulizer solution, Take 2.5 mg by nebulization Every 4 (Four) Hours As Needed for Wheezing., Disp: 252 mL, Rfl: 5  •  albuterol sulfate HFA (ProAir HFA) 108 (90 Base) MCG/ACT inhaler, Inhale 2 puffs Every 4 (Four) Hours As Needed for Wheezing or Shortness of Air., Disp: 16 g, Rfl: 5  •  aspirin 81 MG chewable tablet, Chew 81 mg Daily., Disp: , Rfl:   •  atorvastatin (LIPITOR) 40 MG tablet, Take 1 tablet by mouth Daily., Disp: 30 tablet, Rfl: 5  •  carvedilol (COREG) 12.5 MG tablet, Take 1 tablet by mouth 2 (Two) Times a Day With Meals., Disp: 60 tablet, Rfl: 5  •  gabapentin (NEURONTIN) 600 MG tablet, Take 600 mg by mouth 2 (Two) Times a Day., Disp: ,  Rfl:   •  HYDROcodone-acetaminophen (NORCO) 5-325 MG per tablet, Take 1 tablet by mouth 2 (Two) Times a Day As Needed for Severe Pain ., Disp: , Rfl:   •  ipratropium-albuterol (DUO-NEB) 0.5-2.5 mg/3 ml nebulizer, Take 3 mL by nebulization Every 4 (Four) Hours As Needed for Wheezing., Disp: 360 mL, Rfl: 0  •  meloxicam (MOBIC) 15 MG tablet, Take 1 tablet by mouth Daily., Disp: 30 tablet, Rfl: 5  •  NARCAN 4 MG/0.1ML nasal spray, 1 spray into the nostril(s) as directed by provider As Needed., Disp: , Rfl:   •  O2 (OXYGEN), Inhale 2 L/min 1 (One) Time. At night, Disp: , Rfl:   •  pantoprazole (PROTONIX) 40 MG EC tablet, TAKE 1 TABLET BY MOUTH DAILY., Disp: 30 tablet, Rfl: 4  •  promethazine-dextromethorphan (PROMETHAZINE-DM) 6.25-15 MG/5ML syrup, Take 5 mL by mouth 4 (Four) Times a Day As Needed for Cough., Disp: 118 mL, Rfl: 0  •  rOPINIRole (REQUIP) 0.25 MG tablet, TAKE 1 TABLET BY MOUTH THREE TIMES DAILY . TAKE 1 HOUR BEFORE BEDTIME, Disp: 90 tablet, Rfl: 1  •  tiZANidine (ZANAFLEX) 4 MG tablet, Take 1 tablet by mouth Every 8 (Eight) Hours As Needed for Muscle Spasms., Disp: 90 tablet, Rfl: 3  •  valsartan (DIOVAN) 80 MG tablet, TAKE 1 TABLET BY MOUTH DAILY., Disp: 30 tablet, Rfl: 4  •  vitamin D (ERGOCALCIFEROL) 1.25 MG (70364 UT) capsule capsule, TAKE 1 CAPSULE BY MOUTH 1 (ONE) TIME PER WEEK., Disp: 5 capsule, Rfl: 4  •  Fluticasone-Vilant 100-25 MCG/ACT aerosol powder , Inhale 1 puff Daily     Allergies:   No Known Allergies    Objective     Physical Exam:  Vital Signs:   Vitals:    12/30/22 1532   BP: 120/80   Pulse: 67   Resp: 14   Temp: 97.1 °F (36.2 °C)   SpO2: 95%   Weight: 76.7 kg (169 lb)   Height: 182.9 cm (72\")     Body mass index is 22.92 kg/m².  ============================  ============================    6 MINUTE WALK TEST    Gaston Bagley   1960             BASELINE   SpO2%: % 95 RA    Heart Rate 67   Blood Pressure 120/80     EXERCISE SpO2% HEART RATE RA or O2 @ LPM   1 MINUTE 92 88 RA   2  MINUTES 91 93 RA   3 MINUTES 92 87 RA   4 MINUTES 92 88 RA   5 MINUTES 93 94 RA   6 MINUTES 92 92 RA   (Number of laps: 7 X 36 meters + Final partial lap:  meters = meters)            Distance Walked:  252 Meters   SpO2% Post Exercise:  95 %   HR Post Exercise:  98     Reason to stop (if applicable):   ____ Chest Pain   ____ Light Headedness   ____ Dyspnea Unrelieved by Rest   ____ Abnormal Gait Pattern   ____ Severe Fatigue   ____ Other (Specify: __________________________)    Tech Comments (if any): NONE    Test performed by: AM     ============================  ============================    Physical Exam  Vitals reviewed.   Constitutional:       General: He is not in acute distress.     Appearance: He is not toxic-appearing.   HENT:      Head: Normocephalic and atraumatic.   Eyes:      Extraocular Movements: Extraocular movements intact.      Conjunctiva/sclera: Conjunctivae normal.      Pupils: Pupils are equal, round, and reactive to light.   Cardiovascular:      Rate and Rhythm: Normal rate.      Heart sounds: Normal heart sounds.   Pulmonary:      Effort: Pulmonary effort is normal.      Breath sounds: Normal breath sounds.   Abdominal:      General: There is no distension.   Musculoskeletal:         General: No swelling.      Cervical back: Neck supple.   Skin:     General: Skin is warm and dry.      Findings: No rash.   Neurological:      General: No focal deficit present.      Mental Status: He is alert and oriented to person, place, and time.   Psychiatric:         Mood and Affect: Mood normal.         Behavior: Behavior normal.       Results Review:   February 2021 PFTs showed moderate obstruction, with significant bronchodilator response. Post BD FEV1: 68%, FEV1/FVC ratio was 56. No restriction, but air-trapping suggested. Moderately decreased diffusion capacity. DLCO/VA: 69%.    WBC   Date Value Ref Range Status   08/08/2022 7.9 3.4 - 10.8 x10E3/uL Final     RBC   Date Value Ref Range Status    08/08/2022 4.97 4.14 - 5.80 x10E6/uL Final     Hemoglobin   Date Value Ref Range Status   08/08/2022 15.4 13.0 - 17.7 g/dL Final   02/26/2021 15.1 13.0 - 17.7 g/dL Final     Hematocrit   Date Value Ref Range Status   08/08/2022 44.2 37.5 - 51.0 % Final   02/26/2021 44.7 37.5 - 51.0 % Final     MCV   Date Value Ref Range Status   08/08/2022 89 79 - 97 fL Final   02/26/2021 87.5 79.0 - 97.0 fL Final     MCH   Date Value Ref Range Status   08/08/2022 31.0 26.6 - 33.0 pg Final   02/26/2021 29.5 26.6 - 33.0 pg Final     MCHC   Date Value Ref Range Status   08/08/2022 34.8 31.5 - 35.7 g/dL Final   02/26/2021 33.8 31.5 - 35.7 g/dL Final     RDW   Date Value Ref Range Status   08/08/2022 13.3 11.6 - 15.4 % Final   02/26/2021 13.1 12.3 - 15.4 % Final     RDW-SD   Date Value Ref Range Status   02/26/2021 42.2 37.0 - 54.0 fl Final     MPV   Date Value Ref Range Status   02/26/2021 10.6 6.0 - 12.0 fL Final     Platelets   Date Value Ref Range Status   08/08/2022 196 150 - 450 x10E3/uL Final   02/26/2021 223 140 - 450 10*3/mm3 Final     Neutrophil Rel %   Date Value Ref Range Status   08/08/2022 69 Not Estab. % Final     Neutrophil %   Date Value Ref Range Status   02/26/2021 69.5 42.7 - 76.0 % Final     Lymphocyte Rel %   Date Value Ref Range Status   08/08/2022 23 Not Estab. % Final     Lymphocyte %   Date Value Ref Range Status   02/26/2021 23.4 19.6 - 45.3 % Final     Monocyte Rel %   Date Value Ref Range Status   08/08/2022 6 Not Estab. % Final     Monocyte %   Date Value Ref Range Status   02/26/2021 5.0 5.0 - 12.0 % Final     Eosinophil Rel %   Date Value Ref Range Status   08/08/2022 1 Not Estab. % Final     Eosinophil %   Date Value Ref Range Status   02/26/2021 1.2 0.3 - 6.2 % Final     Basophil Rel %   Date Value Ref Range Status   08/08/2022 1 Not Estab. % Final     Basophil %   Date Value Ref Range Status   02/26/2021 0.5 0.0 - 1.5 % Final     Immature Grans %   Date Value Ref Range Status   02/26/2021 0.4 0.0 -  0.5 % Final     Neutrophils Absolute   Date Value Ref Range Status   08/08/2022 5.4 1.4 - 7.0 x10E3/uL Final     Neutrophils, Absolute   Date Value Ref Range Status   02/26/2021 5.29 1.70 - 7.00 10*3/mm3 Final     Lymphocytes Absolute   Date Value Ref Range Status   08/08/2022 1.9 0.7 - 3.1 x10E3/uL Final     Lymphocytes, Absolute   Date Value Ref Range Status   02/26/2021 1.78 0.70 - 3.10 10*3/mm3 Final     Monocytes Absolute   Date Value Ref Range Status   08/08/2022 0.5 0.1 - 0.9 x10E3/uL Final     Monocytes, Absolute   Date Value Ref Range Status   02/26/2021 0.38 0.10 - 0.90 10*3/mm3 Final     Eosinophils Absolute   Date Value Ref Range Status   08/08/2022 0.1 0.0 - 0.4 x10E3/uL Final     Eosinophils, Absolute   Date Value Ref Range Status   02/26/2021 0.09 0.00 - 0.40 10*3/mm3 Final     Basophils Absolute   Date Value Ref Range Status   08/08/2022 0.1 0.0 - 0.2 x10E3/uL Final     Basophils, Absolute   Date Value Ref Range Status   02/26/2021 0.04 0.00 - 0.20 10*3/mm3 Final     Immature Grans, Absolute   Date Value Ref Range Status   02/26/2021 0.03 0.00 - 0.05 10*3/mm3 Final     nRBC   Date Value Ref Range Status   11/29/2021 0.0 0.0 - 0.2 /100 WBC Final   02/26/2021 0.0 0.0 - 0.2 /100 WBC Final     Lab Results   Component Value Date    GLUCOSE 88 08/08/2022    BUN 14 08/08/2022    CREATININE 0.97 08/08/2022    EGFRIFNONA 130 11/29/2021    EGFRIFAFRI >150 11/29/2021    BCR 14 08/08/2022    K 4.4 08/08/2022    CO2 23 08/08/2022    CALCIUM 8.9 08/08/2022    PROTENTOTREF 6.8 08/08/2022    ALBUMIN 4.7 08/08/2022    LABIL2 2.2 08/08/2022    AST 17 08/08/2022    ALT 12 08/08/2022     No results found for: SITE, ALLENTEST, PHART, HBQ1FNL, PO2ART, IJX9PCA, BASEEXCESS, D9DWLCUH, HGBBG, HCTABG, OXYHEMOGLOBI, METHHGBN, CARBOXYHGB, CO2CT, BAROMETRIC, MODALITY, FIO2    A1AT levels normal in 2020 and 2021 though genotype not reported.    Assessment / Plan      Assessment/Plan:   Diagnoses and all orders for this  visit:    1. Asthma-COPD overlap syndrome (HCC) (Primary)  -     POCT FENO Test  -     Alpha - 1 - Antitrypsin; Future  -     6 Minute Walk Test; Future  -     Pulmonary Function Test; Future  -     Fluticasone-Umeclidin-Vilant 200-62.5-25 MCG/ACT aerosol powder ; Inhale 1 puff Daily for 30 days. Rinse mouth out after use  Dispense: 1 each; Refill: 5  No need for supplemental O2 w/ exertion per walk test performed today. FeNo today was 27 ppb. Prior PFT results reviewed. Patient has been treated for at least 3 exacerbations this year. Stop use of Breo and optimize regimen with use of high-dose Trelegy. He has tried and failed use of Breo, Symbicort, Anoro, Spiriva, and Advair. We discussed the risk and benefits of inhaled corticosteroids. Patient instructed to take them on a regular basis as prescribed. Patient instructed to rinse their mouth out after each use. Patient instructed to contact their insurance company and make sure that the medications prescribed are on their formulary and the lowest cost/tier for them. They will call the clinic back if different medications need to prescribed. Schedule repeat PFTs. Check A1AT genotype.    2. Personal history of nicotine dependence  Complete cessation advised. Patients symptoms are not expected to be as controlled as possible while still smoking and progression of lung disease will occur more rapidly with ongoing cigarette use. Recommend continuing with annual LDCT chest imaging. Repeat scan has already been ordered by his PCP.     Follow Up:   June 2023 as previously scheduled  The patient was counseled on diagnostic results, risks and benefits of treatment options, risk factor modifications and the importance of treatment compliance. The patient was advised to contact the clinic with concerns or worsening symptoms.     NA Pryor   Pulmonary Medicine [place]

## 2023-01-09 LAB — EXHALED NITROUS OXIDE: 27

## 2023-01-23 ENCOUNTER — HOSPITAL ENCOUNTER (OUTPATIENT)
Dept: CT IMAGING | Facility: HOSPITAL | Age: 63
Discharge: HOME | End: 2023-01-23
Admitting: FAMILY MEDICINE
Payer: COMMERCIAL

## 2023-01-23 DIAGNOSIS — Z87.891 PERSONAL HISTORY OF SMOKING: ICD-10-CM

## 2023-01-23 PROCEDURE — 71271 CT THORAX LUNG CANCER SCR C-: CPT

## 2023-01-28 DIAGNOSIS — E78.2 MIXED HYPERLIPIDEMIA: ICD-10-CM

## 2023-01-28 DIAGNOSIS — I47.1 PAROXYSMAL SVT (SUPRAVENTRICULAR TACHYCARDIA): ICD-10-CM

## 2023-01-28 DIAGNOSIS — I10 ESSENTIAL HYPERTENSION: ICD-10-CM

## 2023-01-30 RX ORDER — ATORVASTATIN CALCIUM 40 MG/1
TABLET, FILM COATED ORAL
Qty: 30 TABLET | Refills: 4 | Status: SHIPPED | OUTPATIENT
Start: 2023-01-30

## 2023-01-30 RX ORDER — CARVEDILOL 12.5 MG/1
TABLET ORAL
Qty: 60 TABLET | Refills: 4 | Status: SHIPPED | OUTPATIENT
Start: 2023-01-30

## 2023-03-06 DIAGNOSIS — G25.81 RLS (RESTLESS LEGS SYNDROME): ICD-10-CM

## 2023-03-06 RX ORDER — ROPINIROLE 0.25 MG/1
TABLET, FILM COATED ORAL
Qty: 90 TABLET | Refills: 0 | Status: SHIPPED | OUTPATIENT
Start: 2023-03-06

## 2023-04-10 ENCOUNTER — OFFICE VISIT (OUTPATIENT)
Dept: FAMILY MEDICINE CLINIC | Facility: CLINIC | Age: 63
End: 2023-04-10
Payer: COMMERCIAL

## 2023-04-10 VITALS
HEART RATE: 68 BPM | DIASTOLIC BLOOD PRESSURE: 80 MMHG | BODY MASS INDEX: 21.4 KG/M2 | SYSTOLIC BLOOD PRESSURE: 130 MMHG | TEMPERATURE: 98.3 F | WEIGHT: 158 LBS | HEIGHT: 72 IN | OXYGEN SATURATION: 95 %

## 2023-04-10 DIAGNOSIS — F17.200 TOBACCO USE DISORDER: ICD-10-CM

## 2023-04-10 DIAGNOSIS — I47.1 PAROXYSMAL SVT (SUPRAVENTRICULAR TACHYCARDIA): ICD-10-CM

## 2023-04-10 DIAGNOSIS — E78.2 MIXED HYPERLIPIDEMIA: ICD-10-CM

## 2023-04-10 DIAGNOSIS — E55.9 VITAMIN D DEFICIENCY: ICD-10-CM

## 2023-04-10 DIAGNOSIS — E53.8 VITAMIN B 12 DEFICIENCY: ICD-10-CM

## 2023-04-10 DIAGNOSIS — J44.1 COPD EXACERBATION: Primary | ICD-10-CM

## 2023-04-10 DIAGNOSIS — I10 ESSENTIAL HYPERTENSION: ICD-10-CM

## 2023-04-10 PROBLEM — Z98.1 HISTORY OF LUMBAR FUSION: Status: ACTIVE | Noted: 2021-02-26

## 2023-04-10 RX ORDER — METHYLPREDNISOLONE ACETATE 40 MG/ML
40 INJECTION, SUSPENSION INTRA-ARTICULAR; INTRALESIONAL; INTRAMUSCULAR; SOFT TISSUE ONCE
Status: COMPLETED | OUTPATIENT
Start: 2023-04-10 | End: 2023-04-10

## 2023-04-10 RX ORDER — AZITHROMYCIN 250 MG/1
TABLET, FILM COATED ORAL
Qty: 6 TABLET | Refills: 0 | Status: SHIPPED | OUTPATIENT
Start: 2023-04-10

## 2023-04-10 RX ORDER — PREDNISONE 20 MG/1
TABLET ORAL
Qty: 14 TABLET | Refills: 0 | Status: SHIPPED | OUTPATIENT
Start: 2023-04-10

## 2023-04-10 RX ADMIN — METHYLPREDNISOLONE ACETATE 40 MG: 40 INJECTION, SUSPENSION INTRA-ARTICULAR; INTRALESIONAL; INTRAMUSCULAR; SOFT TISSUE at 16:33

## 2023-04-10 NOTE — PROGRESS NOTES
Subjective   Gaston Bagley is a 62 y.o. male.     History of Present Illness  Mr. Bagley presents today for routine follow up on multiple chronic medical problems.     He has Diaz Parkinson White, paroxysmal SVT-previously followed by Dr. FOOTE, cardiology at [facility].  Denies change in exercise tolerance other than related to COPD. No chest pain or apparent anginal equivalents.   Denies recent ER visit.     He is on nocturnal oxygen for COPD. Does have nebulizer on hand with albuterol and has been using as needed. He is smoking more since his wife . Took abx since last dec.  Has had follow-up with pulmonology since that time. Trouble affording inhalers.  He complains of worsening cough prod of sputum, chest congestion, wheeze and dyspnea on exertion.  No fever, chest pain or hemoptysis.  Weight stable.     Vitamin D deficient, taking vitamin D 5000     Taking his valsartan for management of hypertension.     On atorvastatin for dyslipidemia.  Tolerating well.    Low vit D, B12 in past.    Wife passed away from chronic illness. Has decreased appetite, lost weight, not sleeping a well. Getting some better this week. Denies SI.    Pt's previous HPI reviewed and updated as indicated.       The following portions of the patient's history were reviewed and updated as appropriate: allergies, current medications, past family history, past medical history, past social history, past surgical history and problem list.    Review of Systems   Constitutional: Positive for appetite change, fatigue and unexpected weight change. Negative for fever.   HENT: Negative for congestion, ear pain, hearing loss, nosebleeds, rhinorrhea, sneezing, sore throat, tinnitus and trouble swallowing.    Eyes: Negative for pain, discharge, redness and visual disturbance.   Respiratory: Positive for cough, shortness of breath and wheezing. Negative for chest tightness.    Cardiovascular: Positive for palpitations. Negative for chest pain and  leg swelling.   Gastrointestinal: Negative for abdominal pain, blood in stool, constipation, diarrhea, nausea and vomiting.   Endocrine: Negative for cold intolerance, heat intolerance, polydipsia and polyuria.        Night sweats   Genitourinary: Negative for dysuria, flank pain, frequency, hematuria and urgency.   Musculoskeletal: Positive for arthralgias, back pain and myalgias. Negative for joint swelling and neck pain.   Skin: Negative for rash and wound.   Neurological: Positive for weakness (generalized). Negative for dizziness, tremors, seizures, syncope, speech difficulty, numbness and headaches.   Hematological: Negative for adenopathy. Bruises/bleeds easily.   Psychiatric/Behavioral: Positive for dysphoric mood and sleep disturbance. Negative for confusion and suicidal ideas. The patient is nervous/anxious.    Pt's previous ROS reviewed and updated as indicated.       Objective    Vitals:    04/10/23 1533   BP: 130/80   Pulse: 68   Temp: 98.3 °F (36.8 °C)   SpO2: 95%     Body mass index is 21.43 kg/m².      04/10/23  1533   Weight: 71.7 kg (158 lb)       Physical Exam  Vitals and nursing note reviewed.   Constitutional:       General: He is not in acute distress.     Appearance: He is well-developed, well-groomed and normal weight. He is ill-appearing (mildly).      Comments: Appears older than stated age   HENT:      Mouth/Throat:      Mouth: Mucous membranes are moist.   Eyes:      General: No scleral icterus.     Conjunctiva/sclera: Conjunctivae normal.   Neck:      Thyroid: No thyroid mass.      Vascular: Normal carotid pulses. No carotid bruit.   Cardiovascular:      Rate and Rhythm: Normal rate and regular rhythm.      Pulses: Decreased pulses (pedal).      Heart sounds: Heart sounds are distant.   Pulmonary:      Effort: Pulmonary effort is normal.      Breath sounds: Decreased breath sounds, wheezing and rhonchi present. No rales.   Musculoskeletal:      Right lower leg: No edema.      Left lower  leg: No edema.   Lymphadenopathy:      Cervical: No cervical adenopathy.      Upper Body:      Right upper body: No supraclavicular adenopathy.      Left upper body: No supraclavicular adenopathy.   Skin:     General: Skin is warm and dry.      Coloration: Skin is not cyanotic, jaundiced or pale.      Findings: No rash.      Nails: There is clubbing.   Neurological:      Mental Status: He is alert and oriented to person, place, and time.      Gait: Gait is intact.   Psychiatric:         Mood and Affect: Mood is depressed.         Speech: Speech normal.         Behavior: Behavior normal. Behavior is cooperative.         Thought Content: Thought content normal. Thought content is not paranoid or delusional. Thought content does not include suicidal ideation.         Cognition and Memory: Cognition normal.     Pt's previous physical exam reviewed and updated as indicated.        Assessment & Plan   Diagnoses and all orders for this visit:    1. COPD exacerbation (Primary)  -     methylPREDNISolone acetate (DEPO-medrol) injection 40 mg  -     CBC & Differential  -     azithromycin (Zithromax Z-Alban) 250 MG tablet; Take 2 tablets the first day, then 1 tablet daily for 4 days.  Dispense: 6 tablet; Refill: 0  -     predniSONE (DELTASONE) 20 MG tablet; 3 po qd x 2 days, then 2 po qd x 2 days, then 1 po qd x 2 days, then 1/2 po qd x 4 days  Dispense: 14 tablet; Refill: 0    2. Vitamin D deficiency  -     Vitamin D,25-Hydroxy    3. Vitamin B 12 deficiency  -     CBC & Differential  -     Vitamin B12    4. Essential hypertension  -     CBC & Differential  -     Comprehensive Metabolic Panel  -     TSH Rfx On Abnormal To Free T4    5. Tobacco use disorder    6. Paroxysmal SVT (supraventricular tachycardia)  -     Comprehensive Metabolic Panel  -     TSH Rfx On Abnormal To Free T4    Other orders  -     T4F       Abx, oral CS as above. Encouraged regular use of albuterol neb.  Tobacco cessation advised.  Pt voiced understanding  of health risks of cont'd tobacco use.  F/u with pulmonology encouraged.  Assess status of vit/min deficiencies and replace as indicated.  Cont ASA, statin, coreg, diovan. Pt advised to eat a heart healthy diet and get regular aerobic exercise.  Routine f/u in 3 months, sooner as needed/instructed.  I will contact patient regarding test results and provide instructions regarding any necessary changes in plan of care.  Patient was encouraged to keep me informed of any acute changes, lack of improvement, or any new concerning symptoms.  Pt is aware of reasons to seek emergent care.  Patient voiced understanding of all instructions and denied further questions.    Please note that portions of this note may have been completed with a voice recognition program.

## 2023-04-11 LAB
25(OH)D3+25(OH)D2 SERPL-MCNC: 37.4 NG/ML (ref 30–100)
ALBUMIN SERPL-MCNC: 4.3 G/DL (ref 3.8–4.9)
ALBUMIN/GLOB SERPL: 2.2 {RATIO} (ref 1.2–2.2)
ALP SERPL-CCNC: 119 IU/L (ref 44–121)
ALT SERPL-CCNC: 6 IU/L (ref 0–44)
AST SERPL-CCNC: 9 IU/L (ref 0–40)
BASOPHILS # BLD AUTO: 0 X10E3/UL (ref 0–0.2)
BASOPHILS NFR BLD AUTO: 1 %
BILIRUB SERPL-MCNC: 0.9 MG/DL (ref 0–1.2)
BUN SERPL-MCNC: 9 MG/DL (ref 8–27)
BUN/CREAT SERPL: 11 (ref 10–24)
CALCIUM SERPL-MCNC: 9 MG/DL (ref 8.6–10.2)
CHLORIDE SERPL-SCNC: 102 MMOL/L (ref 96–106)
CO2 SERPL-SCNC: 26 MMOL/L (ref 20–29)
CREAT SERPL-MCNC: 0.8 MG/DL (ref 0.76–1.27)
EGFRCR SERPLBLD CKD-EPI 2021: 101 ML/MIN/1.73
EOSINOPHIL # BLD AUTO: 0.1 X10E3/UL (ref 0–0.4)
EOSINOPHIL NFR BLD AUTO: 1 %
ERYTHROCYTE [DISTWIDTH] IN BLOOD BY AUTOMATED COUNT: 12.1 % (ref 11.6–15.4)
GLOBULIN SER CALC-MCNC: 2 G/DL (ref 1.5–4.5)
GLUCOSE SERPL-MCNC: 91 MG/DL (ref 70–99)
HCT VFR BLD AUTO: 46.7 % (ref 37.5–51)
HGB BLD-MCNC: 15.6 G/DL (ref 13–17.7)
IMM GRANULOCYTES # BLD AUTO: 0 X10E3/UL (ref 0–0.1)
IMM GRANULOCYTES NFR BLD AUTO: 0 %
LYMPHOCYTES # BLD AUTO: 1.6 X10E3/UL (ref 0.7–3.1)
LYMPHOCYTES NFR BLD AUTO: 22 %
MCH RBC QN AUTO: 30.1 PG (ref 26.6–33)
MCHC RBC AUTO-ENTMCNC: 33.4 G/DL (ref 31.5–35.7)
MCV RBC AUTO: 90 FL (ref 79–97)
MONOCYTES # BLD AUTO: 0.4 X10E3/UL (ref 0.1–0.9)
MONOCYTES NFR BLD AUTO: 6 %
NEUTROPHILS # BLD AUTO: 5.1 X10E3/UL (ref 1.4–7)
NEUTROPHILS NFR BLD AUTO: 70 %
PLATELET # BLD AUTO: 200 X10E3/UL (ref 150–450)
POTASSIUM SERPL-SCNC: 4 MMOL/L (ref 3.5–5.2)
PROT SERPL-MCNC: 6.3 G/DL (ref 6–8.5)
RBC # BLD AUTO: 5.19 X10E6/UL (ref 4.14–5.8)
SODIUM SERPL-SCNC: 139 MMOL/L (ref 134–144)
T4 FREE SERPL-MCNC: 1.8 NG/DL (ref 0.82–1.77)
TSH SERPL DL<=0.005 MIU/L-ACNC: 0.3 UIU/ML (ref 0.45–4.5)
VIT B12 SERPL-MCNC: 280 PG/ML (ref 232–1245)
WBC # BLD AUTO: 7.2 X10E3/UL (ref 3.4–10.8)

## 2023-04-15 DIAGNOSIS — E05.90 HYPERTHYROIDISM: Primary | ICD-10-CM

## 2023-05-01 ENCOUNTER — TELEPHONE (OUTPATIENT)
Dept: FAMILY MEDICINE CLINIC | Facility: CLINIC | Age: 63
End: 2023-05-01

## 2023-05-01 NOTE — TELEPHONE ENCOUNTER
Caller: Gaston Bagley    Relationship: Self    Best call back number:      [phone]     What is the best time to reach you:     ANY TIME    Who are you requesting to speak with (clinical staff, provider,  specific staff member):     NURSE    What was the call regarding:     PATIENT STATED DR YBARRA RECOMMENDED HE BEGIN TAKING OVER THE COUNTER B-12 TABLETS    PATIENT REQUESTED A CALL BACK WITH [employer]AGE [employer]OUNT DR YBARRA WOULD RECOMMEND    Do you require a callback:     YES

## 2023-06-13 ENCOUNTER — HOSPITAL ENCOUNTER (OUTPATIENT)
Dept: GENERAL RADIOLOGY | Facility: HOSPITAL | Age: 63
Discharge: HOME | End: 2023-06-13
Admitting: ANESTHESIOLOGY
Payer: COMMERCIAL

## 2023-06-13 ENCOUNTER — TRANSCRIBE ORDERS (OUTPATIENT)
Dept: GENERAL RADIOLOGY | Facility: HOSPITAL | Age: 63
End: 2023-06-13
Payer: COMMERCIAL

## 2023-06-13 DIAGNOSIS — M79.642 LEFT HAND PAIN: ICD-10-CM

## 2023-06-13 DIAGNOSIS — M79.641 RIGHT HAND PAIN: Primary | ICD-10-CM

## 2023-06-13 DIAGNOSIS — M79.641 RIGHT HAND PAIN: ICD-10-CM

## 2023-06-13 PROCEDURE — 73130 X-RAY EXAM OF HAND: CPT

## 2023-08-02 ENCOUNTER — OFFICE VISIT (OUTPATIENT)
Dept: FAMILY MEDICINE CLINIC | Facility: CLINIC | Age: 63
End: 2023-08-02
Payer: COMMERCIAL

## 2023-08-02 VITALS
WEIGHT: 164.8 LBS | TEMPERATURE: 97.7 F | BODY MASS INDEX: 22.32 KG/M2 | SYSTOLIC BLOOD PRESSURE: 128 MMHG | HEIGHT: 72 IN | OXYGEN SATURATION: 97 % | HEART RATE: 66 BPM | DIASTOLIC BLOOD PRESSURE: 90 MMHG

## 2023-08-02 DIAGNOSIS — I45.6 WOLFF-PARKINSON-WHITE SYNDROME: ICD-10-CM

## 2023-08-02 DIAGNOSIS — R94.6 ABNORMAL THYROID FUNCTION TEST: ICD-10-CM

## 2023-08-02 DIAGNOSIS — R00.2 PALPITATIONS: ICD-10-CM

## 2023-08-02 DIAGNOSIS — I10 ESSENTIAL HYPERTENSION: ICD-10-CM

## 2023-08-02 DIAGNOSIS — R21 BLISTERING RASH: Primary | ICD-10-CM

## 2023-08-02 PROCEDURE — 1160F RVW MEDS BY RX/DR IN RCRD: CPT | Performed by: FAMILY MEDICINE

## 2023-08-02 PROCEDURE — 3080F DIAST BP >= 90 MM HG: CPT | Performed by: FAMILY MEDICINE

## 2023-08-02 PROCEDURE — 99214 OFFICE O/P EST MOD 30 MIN: CPT | Performed by: FAMILY MEDICINE

## 2023-08-02 PROCEDURE — 3074F SYST BP LT 130 MM HG: CPT | Performed by: FAMILY MEDICINE

## 2023-08-02 PROCEDURE — 1159F MED LIST DOCD IN RCRD: CPT | Performed by: FAMILY MEDICINE

## 2023-08-02 RX ORDER — DOXYCYCLINE HYCLATE 100 MG
100 TABLET ORAL 2 TIMES DAILY
COMMUNITY
Start: 2023-07-19

## 2023-08-03 LAB
ALBUMIN SERPL-MCNC: 4.5 G/DL (ref 3.8–4.9)
ALBUMIN/GLOB SERPL: 2.1 {RATIO} (ref 1.2–2.2)
ALP SERPL-CCNC: 145 IU/L (ref 44–121)
ALT SERPL-CCNC: 14 IU/L (ref 0–44)
AST SERPL-CCNC: 13 IU/L (ref 0–40)
BASOPHILS # BLD AUTO: 0.1 X10E3/UL (ref 0–0.2)
BASOPHILS NFR BLD AUTO: 1 %
BILIRUB SERPL-MCNC: 0.8 MG/DL (ref 0–1.2)
BUN SERPL-MCNC: 13 MG/DL (ref 8–27)
BUN/CREAT SERPL: 16 (ref 10–24)
CALCIUM SERPL-MCNC: 8.9 MG/DL (ref 8.6–10.2)
CHLORIDE SERPL-SCNC: 101 MMOL/L (ref 96–106)
CO2 SERPL-SCNC: 23 MMOL/L (ref 20–29)
CREAT SERPL-MCNC: 0.81 MG/DL (ref 0.76–1.27)
CRP SERPL-MCNC: <1 MG/L (ref 0–10)
EGFRCR SERPLBLD CKD-EPI 2021: 101 ML/MIN/1.73
EOSINOPHIL # BLD AUTO: 0.2 X10E3/UL (ref 0–0.4)
EOSINOPHIL NFR BLD AUTO: 2 %
ERYTHROCYTE [DISTWIDTH] IN BLOOD BY AUTOMATED COUNT: 12.9 % (ref 11.6–15.4)
ERYTHROCYTE [SEDIMENTATION RATE] IN BLOOD BY WESTERGREN METHOD: 4 MM/HR (ref 0–30)
GLOBULIN SER CALC-MCNC: 2.1 G/DL (ref 1.5–4.5)
GLUCOSE SERPL-MCNC: 91 MG/DL (ref 70–99)
HCT VFR BLD AUTO: 44.7 % (ref 37.5–51)
HGB BLD-MCNC: 15.7 G/DL (ref 13–17.7)
IMM GRANULOCYTES # BLD AUTO: 0 X10E3/UL (ref 0–0.1)
IMM GRANULOCYTES NFR BLD AUTO: 1 %
LYMPHOCYTES # BLD AUTO: 2.4 X10E3/UL (ref 0.7–3.1)
LYMPHOCYTES NFR BLD AUTO: 31 %
MCH RBC QN AUTO: 30.4 PG (ref 26.6–33)
MCHC RBC AUTO-ENTMCNC: 35.1 G/DL (ref 31.5–35.7)
MCV RBC AUTO: 87 FL (ref 79–97)
MONOCYTES # BLD AUTO: 0.5 X10E3/UL (ref 0.1–0.9)
MONOCYTES NFR BLD AUTO: 6 %
NEUTROPHILS # BLD AUTO: 4.7 X10E3/UL (ref 1.4–7)
NEUTROPHILS NFR BLD AUTO: 59 %
PLATELET # BLD AUTO: 218 X10E3/UL (ref 150–450)
POTASSIUM SERPL-SCNC: 4.3 MMOL/L (ref 3.5–5.2)
PROT SERPL-MCNC: 6.6 G/DL (ref 6–8.5)
RBC # BLD AUTO: 5.17 X10E6/UL (ref 4.14–5.8)
SODIUM SERPL-SCNC: 137 MMOL/L (ref 134–144)
TSH SERPL DL<=0.005 MIU/L-ACNC: 1.02 UIU/ML (ref 0.45–4.5)
WBC # BLD AUTO: 8 X10E3/UL (ref 3.4–10.8)

## 2023-08-04 DIAGNOSIS — E78.2 MIXED HYPERLIPIDEMIA: ICD-10-CM

## 2023-08-04 DIAGNOSIS — R74.8 HIGH ALKALINE PHOSPHATASE: Primary | ICD-10-CM

## 2023-08-04 RX ORDER — ATORVASTATIN CALCIUM 40 MG/1
TABLET, FILM COATED ORAL
Qty: 30 TABLET | Refills: 3 | Status: SHIPPED | OUTPATIENT
Start: 2023-08-04

## 2023-08-24 ENCOUNTER — TELEPHONE (OUTPATIENT)
Dept: FAMILY MEDICINE CLINIC | Facility: CLINIC | Age: 63
End: 2023-08-24
Payer: COMMERCIAL

## 2023-08-24 NOTE — TELEPHONE ENCOUNTER
Caller: Gaston Bagley     Relationship: SELF    Best call back number: [phone]    What is your medical concern? A COUPLE OF WEEKS BACK   BLISTERS ON TOP OF BOTTOM UNDER TOE AND SIDE OF BOTH FEET     How long has this issue been going on? SHOWED UP AGAIN LAST WEEKEND. WAS TOLD TO CALL BACK WHEN THEY CAME BACK    Is your provider already aware of this issue? YES, DR YBARRA SAID SHE MAY HAVE TO BIOPSY IF THEY COME BACK    Have you been treated for this issue? AT LAST VISIT WAS GIVEN MEDICATION LAST VISIT. STARTED DRYING UP BUT THEY HAVE COME BACK AGAIN.    PLEASE CALL TO ADVISE- PATIENT ONLY WANTS TO SEE DR YBARRA. PLEASE CALL TO SCHEDULE/ADVISE

## 2023-08-28 ENCOUNTER — OFFICE VISIT (OUTPATIENT)
Dept: FAMILY MEDICINE CLINIC | Facility: CLINIC | Age: 63
End: 2023-08-28
Payer: COMMERCIAL

## 2023-08-28 VITALS
HEIGHT: 72 IN | DIASTOLIC BLOOD PRESSURE: 86 MMHG | SYSTOLIC BLOOD PRESSURE: 142 MMHG | TEMPERATURE: 98.1 F | HEART RATE: 66 BPM | WEIGHT: 165 LBS | BODY MASS INDEX: 22.35 KG/M2 | OXYGEN SATURATION: 98 %

## 2023-08-28 DIAGNOSIS — I47.1 PAROXYSMAL SVT (SUPRAVENTRICULAR TACHYCARDIA): ICD-10-CM

## 2023-08-28 DIAGNOSIS — S90.829D BLISTER OF FOOT, UNSPECIFIED LATERALITY, SUBSEQUENT ENCOUNTER: Primary | ICD-10-CM

## 2023-08-28 NOTE — PROGRESS NOTES
"Subjective   Gaston Bagley is a 62 y.o. male.     History of Present Illness  Here for recheck of blisters on feet discussed earlier this month at his last visit. Tx'd with topical CS. Much improved but not resolved. He states they have gotten markedly better but seem to be \"coming back\". No other new complaints today.    Has WPW, HTN, moderate TR, paroxysmal SVT - has upcoming f/u with cardiology. Taking meds as rx'd. Denies worsening symptoms.    The following portions of the patient's history were reviewed and updated as appropriate: allergies, current medications, past family history, past medical history, past social history, past surgical history, and problem list.    Review of Systems   Constitutional:  Positive for fatigue. Negative for fever and unexpected weight change.   HENT:  Negative for congestion, ear pain, hearing loss, nosebleeds, rhinorrhea, sneezing, sore throat, tinnitus and trouble swallowing.    Eyes:  Negative for pain, discharge, redness and visual disturbance.   Respiratory:  Positive for cough, shortness of breath and wheezing. Negative for chest tightness.    Cardiovascular:  Positive for palpitations. Negative for chest pain and leg swelling.   Gastrointestinal:  Negative for abdominal pain, blood in stool, constipation, diarrhea, nausea and vomiting.   Endocrine: Negative for cold intolerance, heat intolerance, polydipsia and polyuria.        Night sweats   Genitourinary:  Negative for dysuria, flank pain, frequency, hematuria and urgency.   Musculoskeletal:  Positive for arthralgias, back pain and myalgias. Negative for joint swelling and neck pain.   Skin:  Positive for rash. Negative for wound.   Neurological:  Positive for weakness (generalized). Negative for dizziness, tremors, seizures, syncope, speech difficulty, numbness and headaches.   Hematological:  Negative for adenopathy. Bruises/bleeds easily.   Psychiatric/Behavioral:  Positive for dysphoric mood and sleep disturbance. " Negative for confusion and suicidal ideas. The patient is nervous/anxious.    Pt's previous ROS reviewed and updated as indicated.     Objective   Vitals:    08/28/23 1300   BP: 142/86   Pulse: 66   Temp: 98.1 øF (36.7 øC)   SpO2: 98%     Body mass index is 22.38 kg/mý.      08/28/23  1300   Weight: 74.8 kg (165 lb)     Physical Exam  Vitals and nursing note reviewed.   Constitutional:       General: He is not in acute distress.     Appearance: He is well-developed, well-groomed and normal weight. He is not ill-appearing.      Comments: Appears older than stated age   Eyes:      General: No scleral icterus.     Conjunctiva/sclera: Conjunctivae normal.   Cardiovascular:      Pulses: Decreased pulses (pedal).   Pulmonary:      Effort: Pulmonary effort is normal.   Musculoskeletal:      Right lower leg: No edema.      Left lower leg: No edema.   Skin:     General: Skin is warm and dry.      Coloration: Skin is not cyanotic, jaundiced or pale.      Findings: Rash present.      Nails: There is clubbing.      Comments: Lesion plantar medial midfoot on left foot (previously circular, raised, symmetric, non-pigmented, vascular appearing, NTTP approx 1 cm x 1 cm) now resolved.     Lesions right foot on plantar surface of great toe, medial aspect 1st MTP joint - much improved (previously irregular, crusting, nonpigmented, NTTP. No drainage, no cellulitis.) now with residual small vesicles, no induration.   Neurological:      Mental Status: He is alert and oriented to person, place, and time.      Gait: Gait is intact.   Psychiatric:         Behavior: Behavior is cooperative.   Pt's previous physical exam reviewed and updated as indicated.      Assessment & Plan   Diagnoses and all orders for this visit:    1. Blister of foot, unspecified laterality, subsequent encounter (Primary)  -     Ambulatory Referral to Dermatology    2. Paroxysmal SVT (supraventricular tachycardia)       Continue topical CS. Refer to dermatology for  further eval, possible biopsy.  Routine f/u as scheduled, f/u sooner as needed.  Keep consultation apt with cardiology as scheduled.  Patient was encouraged to keep me informed of any acute changes, lack of improvement, or any new concerning symptoms.  Pt is aware of reasons to seek emergent care.  Patient voiced understanding of all instructions and denied further questions.    Please note that portions of this note may have been completed with a voice recognition program.

## 2023-09-05 RX ORDER — METHOCARBAMOL 500 MG/1
500 TABLET, FILM COATED ORAL 4 TIMES DAILY
Qty: 120 TABLET | Refills: 2 | Status: SHIPPED | OUTPATIENT
Start: 2023-09-05

## 2023-10-08 DIAGNOSIS — G25.81 RLS (RESTLESS LEGS SYNDROME): ICD-10-CM

## 2023-10-08 DIAGNOSIS — E78.2 MIXED HYPERLIPIDEMIA: ICD-10-CM

## 2023-10-09 RX ORDER — ATORVASTATIN CALCIUM 40 MG/1
40 TABLET, FILM COATED ORAL DAILY
Qty: 30 TABLET | Refills: 3 | Status: SHIPPED | OUTPATIENT
Start: 2023-10-09

## 2023-10-09 RX ORDER — ROPINIROLE 0.25 MG/1
0.25 TABLET, FILM COATED ORAL 3 TIMES DAILY
Qty: 90 TABLET | Refills: 0 | Status: SHIPPED | OUTPATIENT
Start: 2023-10-09

## 2023-10-11 ENCOUNTER — OFFICE VISIT (OUTPATIENT)
Dept: FAMILY MEDICINE CLINIC | Facility: CLINIC | Age: 63
End: 2023-10-11
Payer: COMMERCIAL

## 2023-10-11 VITALS
TEMPERATURE: 98.4 F | DIASTOLIC BLOOD PRESSURE: 86 MMHG | HEART RATE: 80 BPM | HEIGHT: 72 IN | OXYGEN SATURATION: 97 % | BODY MASS INDEX: 22.65 KG/M2 | WEIGHT: 167.2 LBS | SYSTOLIC BLOOD PRESSURE: 132 MMHG

## 2023-10-11 DIAGNOSIS — M79.671 HEEL PAIN, BILATERAL: ICD-10-CM

## 2023-10-11 DIAGNOSIS — Z23 NEED FOR INFLUENZA VACCINATION: ICD-10-CM

## 2023-10-11 DIAGNOSIS — E78.2 MIXED HYPERLIPIDEMIA: ICD-10-CM

## 2023-10-11 DIAGNOSIS — M79.672 HEEL PAIN, BILATERAL: ICD-10-CM

## 2023-10-11 DIAGNOSIS — I10 ESSENTIAL HYPERTENSION: ICD-10-CM

## 2023-10-11 DIAGNOSIS — K21.9 GASTROESOPHAGEAL REFLUX DISEASE WITHOUT ESOPHAGITIS: Primary | ICD-10-CM

## 2023-10-11 RX ORDER — LANOLIN ALCOHOL/MO/W.PET/CERES
1000 CREAM (GRAM) TOPICAL DAILY
COMMUNITY

## 2023-10-11 RX ORDER — PANTOPRAZOLE SODIUM 40 MG/1
40 TABLET, DELAYED RELEASE ORAL DAILY
Qty: 30 TABLET | Refills: 11 | Status: SHIPPED | OUTPATIENT
Start: 2023-10-11

## 2023-10-11 RX ORDER — CARVEDILOL 12.5 MG/1
12.5 TABLET ORAL
COMMUNITY
Start: 2023-07-06

## 2023-10-11 NOTE — PROGRESS NOTES
Subjective   Gaston Bagley is a 63 y.o. male.     History of Present Illness  Here for routine f/u on several chronic med problems.    Has had consultation with cardiology since last visit. Has HTN, WPW. Not currently on diovan. He is unsure why it was stopped. Still on ASA, coreg. Has upcoming tests with cardiology, he is unsure of details.    Has HLP for which he is on statin. Taking as rx'd. Denies side effects.    He c/o worsened heartburn, reflux. Has not been taking his PPI regularly. Denies dysphagia. No longer on daily NSAID    Taking B12 orally as directed.    Would like referral back to podiatry for bilateral heel pain/plantar fascitis.     Refer back to podiatry, for bilatreal plantar fascitis, ?injeciton  [place] foot and ankle    Rash/blisters previously tx'd on his feet have resolved with topical steroid.    The following portions of the patient's history were reviewed and updated as appropriate: allergies, current medications, past family history, past medical history, past social history, past surgical history, and problem list.    Review of Systems   Constitutional:  Positive for fatigue. Negative for fever and unexpected weight change.   HENT:  Negative for congestion, ear pain, hearing loss, nosebleeds, rhinorrhea, sneezing, sore throat, tinnitus and trouble swallowing.    Eyes:  Negative for pain, discharge, redness and visual disturbance.   Respiratory:  Positive for cough, shortness of breath and wheezing. Negative for chest tightness.    Cardiovascular:  Positive for palpitations. Negative for chest pain and leg swelling.   Gastrointestinal:  Negative for abdominal pain, blood in stool, constipation, diarrhea, nausea and vomiting.   Endocrine: Negative for cold intolerance, heat intolerance, polydipsia and polyuria.        Night sweats   Genitourinary:  Negative for dysuria, flank pain, frequency, hematuria and urgency.   Musculoskeletal:  Positive for arthralgias, back pain and myalgias. Negative  for joint swelling and neck pain.   Skin:  Negative for rash and wound.   Neurological:  Positive for weakness (generalized). Negative for dizziness, tremors, seizures, syncope, speech difficulty, numbness and headaches.   Hematological:  Negative for adenopathy. Bruises/bleeds easily.   Psychiatric/Behavioral:  Positive for dysphoric mood and sleep disturbance. Negative for confusion and suicidal ideas. The patient is nervous/anxious.    Pt's previous ROS reviewed and updated as indicated.       Objective   Vitals:    10/11/23 1405   BP: 132/86   Pulse: 80   Temp: 98.4 øF (36.9 øC)   SpO2: 97%     Body mass index is 22.68 kg/mý.      10/11/23  1405   Weight: 75.8 kg (167 lb 3.2 oz)       Physical Exam  Vitals and nursing note reviewed.   Constitutional:       General: He is not in acute distress.     Appearance: He is well-developed, well-groomed and normal weight. He is not ill-appearing.      Comments: Appears older than stated age   HENT:      Head: Atraumatic.      Mouth/Throat:      Mouth: Mucous membranes are moist.   Eyes:      General: No scleral icterus.     Conjunctiva/sclera: Conjunctivae normal.   Neck:      Thyroid: No thyroid mass.      Vascular: Normal carotid pulses. No carotid bruit.   Cardiovascular:      Rate and Rhythm: Normal rate and regular rhythm.      Pulses: Decreased pulses (pedal).      Heart sounds: Heart sounds are distant.   Pulmonary:      Effort: Pulmonary effort is normal.      Breath sounds: Decreased breath sounds present. No wheezing, rhonchi or rales.   Abdominal:      General: Bowel sounds are normal. There is no distension.      Palpations: There is no mass.      Tenderness: There is no abdominal tenderness.   Musculoskeletal:      Right lower leg: No edema.      Left lower leg: No edema.   Lymphadenopathy:      Cervical: No cervical adenopathy.   Skin:     General: Skin is warm and dry.      Coloration: Skin is not cyanotic, jaundiced or pale.      Findings: No rash.       Nails: There is clubbing.   Neurological:      Mental Status: He is alert and oriented to person, place, and time.      Gait: Gait is intact.   Psychiatric:         Behavior: Behavior normal. Behavior is cooperative.         Cognition and Memory: Cognition normal.     Pt's previous physical exam reviewed and updated as indicated.    Lab Results   Component Value Date    WBC 8.0 08/02/2023    HGB 15.7 08/02/2023    HCT 44.7 08/02/2023    MCV 87 08/02/2023     08/02/2023       Lab Results   Component Value Date    GLUCOSE 91 08/02/2023    BUN 13 08/02/2023    CREATININE 0.81 08/02/2023    EGFRIFNONA 130 11/29/2021    EGFRIFAFRI >150 11/29/2021    BCR 16 08/02/2023    K 4.3 08/02/2023    CO2 23 08/02/2023    CALCIUM 8.9 08/02/2023    PROTENTOTREF 6.6 08/02/2023    ALBUMIN 4.5 08/02/2023    LABIL2 2.1 08/02/2023    AST 14 08/18/2023    ALT 20 08/18/2023       Lab Results   Component Value Date    CHLPL 103 08/08/2022    TRIG 111 08/08/2022    HDL 37 (L) 08/08/2022    LDL 45 08/08/2022       Lab Results   Component Value Date    HGBA1C 5.6 03/28/2022       Lab Results   Component Value Date    TSH 1.020 08/02/2023         Assessment & Plan   Diagnoses and all orders for this visit:    1. Gastroesophageal reflux disease without esophagitis (Primary)  -     pantoprazole (PROTONIX) 40 MG EC tablet; Take 1 tablet by mouth Daily.  Dispense: 30 tablet; Refill: 11    2. Heel pain, bilateral  -     Ambulatory Referral to Podiatry    3. Essential hypertension    4. Need for influenza vaccination    5. Mixed hyperlipidemia    Other orders  -     Fluzone (or Fluarix & Flulaval for VFC) >6 Mos ([id number])         Restart PPI. Avoid NSAIDs.  HTN fairly well controlled. F/u with cardiology as scheduled. Continue coreg.  HLP - continue atorvastatin.  Routine f/u in 3 months, sooner as needed.  Patient was encouraged to keep me informed of any acute changes, lack of improvement, or any new concerning symptoms.  Pt is aware of  reasons to seek emergent care.  Patient voiced understanding of all instructions and denied further questions.    Please note that portions of this note may have been completed with a voice recognition program.

## 2023-11-10 DIAGNOSIS — E78.2 MIXED HYPERLIPIDEMIA: ICD-10-CM

## 2023-11-10 DIAGNOSIS — E55.9 VITAMIN D DEFICIENCY: ICD-10-CM

## 2023-11-10 DIAGNOSIS — K21.9 GASTROESOPHAGEAL REFLUX DISEASE WITHOUT ESOPHAGITIS: ICD-10-CM

## 2023-11-13 RX ORDER — ASPIRIN 81 MG/1
81 TABLET, CHEWABLE ORAL DAILY
Qty: 30 TABLET | Refills: 3 | Status: SHIPPED | OUTPATIENT
Start: 2023-11-13

## 2023-11-13 RX ORDER — ERGOCALCIFEROL 1.25 MG/1
50000 CAPSULE ORAL WEEKLY
Qty: 5 CAPSULE | Refills: 4 | Status: SHIPPED | OUTPATIENT
Start: 2023-11-13

## 2023-11-13 RX ORDER — ATORVASTATIN CALCIUM 40 MG/1
40 TABLET, FILM COATED ORAL DAILY
Qty: 30 TABLET | Refills: 3 | Status: SHIPPED | OUTPATIENT
Start: 2023-11-13

## 2023-11-13 RX ORDER — PANTOPRAZOLE SODIUM 40 MG/1
40 TABLET, DELAYED RELEASE ORAL DAILY
Qty: 30 TABLET | Refills: 11 | Status: SHIPPED | OUTPATIENT
Start: 2023-11-13

## 2023-12-05 DIAGNOSIS — G25.81 RLS (RESTLESS LEGS SYNDROME): ICD-10-CM

## 2023-12-05 DIAGNOSIS — E55.9 VITAMIN D DEFICIENCY: ICD-10-CM

## 2023-12-06 RX ORDER — ROPINIROLE 0.25 MG/1
0.25 TABLET, FILM COATED ORAL 3 TIMES DAILY
Qty: 90 TABLET | Refills: 0 | Status: SHIPPED | OUTPATIENT
Start: 2023-12-06

## 2023-12-06 RX ORDER — ERGOCALCIFEROL 1.25 MG/1
50000 CAPSULE ORAL WEEKLY
Qty: 5 CAPSULE | Refills: 4 | Status: SHIPPED | OUTPATIENT
Start: 2023-12-06

## 2024-01-15 ENCOUNTER — OFFICE VISIT (OUTPATIENT)
Dept: FAMILY MEDICINE CLINIC | Facility: CLINIC | Age: 64
End: 2024-01-15
Payer: COMMERCIAL

## 2024-01-15 VITALS
HEIGHT: 72 IN | WEIGHT: 174.25 LBS | BODY MASS INDEX: 23.6 KG/M2 | SYSTOLIC BLOOD PRESSURE: 130 MMHG | DIASTOLIC BLOOD PRESSURE: 86 MMHG | HEART RATE: 86 BPM | OXYGEN SATURATION: 97 %

## 2024-01-15 DIAGNOSIS — S39.012A STRAIN OF LUMBAR REGION, INITIAL ENCOUNTER: ICD-10-CM

## 2024-01-15 DIAGNOSIS — E78.2 MIXED HYPERLIPIDEMIA: ICD-10-CM

## 2024-01-15 DIAGNOSIS — Z00.00 WELL ADULT EXAM: Primary | ICD-10-CM

## 2024-01-15 DIAGNOSIS — E53.8 VITAMIN B 12 DEFICIENCY: ICD-10-CM

## 2024-01-15 DIAGNOSIS — I10 ESSENTIAL HYPERTENSION: ICD-10-CM

## 2024-01-15 DIAGNOSIS — Z12.5 SCREENING FOR PROSTATE CANCER: ICD-10-CM

## 2024-01-15 DIAGNOSIS — R74.8 HIGH ALKALINE PHOSPHATASE: ICD-10-CM

## 2024-01-15 RX ORDER — KETOROLAC TROMETHAMINE 30 MG/ML
30 INJECTION, SOLUTION INTRAMUSCULAR; INTRAVENOUS EVERY 6 HOURS PRN
Status: SHIPPED | OUTPATIENT
Start: 2024-01-15 | End: 2024-01-20

## 2024-01-15 RX ORDER — METHYLPREDNISOLONE ACETATE 80 MG/ML
80 INJECTION, SUSPENSION INTRA-ARTICULAR; INTRALESIONAL; INTRAMUSCULAR; SOFT TISSUE ONCE
Status: COMPLETED | OUTPATIENT
Start: 2024-01-15 | End: 2024-01-15

## 2024-01-15 RX ADMIN — KETOROLAC TROMETHAMINE 30 MG: 30 INJECTION, SOLUTION INTRAMUSCULAR; INTRAVENOUS at 14:37

## 2024-01-15 RX ADMIN — METHYLPREDNISOLONE ACETATE 80 MG: 80 INJECTION, SUSPENSION INTRA-ARTICULAR; INTRALESIONAL; INTRAMUSCULAR; SOFT TISSUE at 14:36

## 2024-01-15 NOTE — PROGRESS NOTES
"Subjective   Gaston Bagley is a 63 y.o. male.     History of Present Illness  Here for routine physical and f/u on chronic med problems.    Due for:  Flp  Lung CA screen scheduled    Vaccinations UTD other than Shingrix  Immunization History   Administered Date(s) Administered    Flu Vaccine Quad PF >36MO 10/10/2018, 11/06/2019    Fluzone (or Fluarix & Flulaval for VFC) >6mos 11/15/2022, 10/11/2023    Influenza, Unspecified 10/07/2020, 11/15/2022    Pneumococcal Conjugate 13-Valent (PCV13) 08/15/2016    Pneumococcal Polysaccharide (PPSV23) 08/31/2020    Td (TDVAX) 12/01/2018, 12/01/2018     Smokes 1/2-1 ppd for over 40 yrs. Declines smoking cessation counseling. Denies EtOH or illicit substance use.    . Not currently sexually active. Denies concern regarding STD exposure.    Follows standard safety measures.    Does not go to dentist as he has dentures.    At last visit he restarted PPI for reflux. Symptoms under much better control.    He c/o worsened LBP over weekend. Unsure of trigger - no fall or injury. 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 Pain worst at left lower back. No radiation. No weakness/numbness BLEs, no change in bowel/bladder function. Tx: icy hot, heating pad which \"help a little\"    Noted to have high alk phos last visit, f/u over studies neg    Taking his routine meds for chronic conditions as instructed.  HTN - coreg  HLP - lipitor  Followed by cardiology for WPW, SVT, moderate TR.  COPD - albuterol prn, fluticasone/umeclidin/vilant, duoneb prn. On nocturnal oxygen    The following portions of the patient's history were reviewed and updated as appropriate: allergies, current medications, past family history, past medical history, past social history, past surgical history, and problem list.    Review of Systems   Constitutional:  Positive for fatigue. Negative for fever and unexpected weight change.   HENT:  Negative for congestion, ear pain, hearing loss, nosebleeds, rhinorrhea, sneezing, sore " throat, tinnitus and trouble swallowing.    Eyes:  Negative for pain, discharge, redness and visual disturbance.   Respiratory:  Positive for cough, shortness of breath and wheezing. Negative for chest tightness.    Cardiovascular:  Positive for palpitations. Negative for chest pain and leg swelling.   Gastrointestinal:  Negative for abdominal pain, blood in stool, constipation, diarrhea, nausea and vomiting.   Endocrine: Negative for cold intolerance, heat intolerance, polydipsia and polyuria.        Night sweats   Genitourinary:  Negative for dysuria, flank pain, frequency, hematuria and urgency.   Musculoskeletal:  Positive for arthralgias, back pain, gait problem (due to back pain) and myalgias. Negative for joint swelling and neck pain.   Skin:  Negative for rash and wound.   Neurological:  Positive for weakness (generalized). Negative for dizziness, tremors, seizures, syncope, speech difficulty, numbness and headaches.   Hematological:  Negative for adenopathy. Bruises/bleeds easily.   Psychiatric/Behavioral:  Positive for dysphoric mood and sleep disturbance. Negative for confusion and suicidal ideas. The patient is nervous/anxious.    Pt's previous ROS reviewed and updated as indicated.       Objective   Vitals:    01/15/24 1315   BP: 130/86   Pulse: 86   SpO2: 97%     Body mass index is 23.63 kg/m².      01/15/24  1315   Weight: 79 kg (174 lb 4 oz)       Physical Exam  Vitals and nursing note reviewed.   Constitutional:       General: He is not in acute distress.     Appearance: He is well-developed, well-groomed and normal weight. He is not ill-appearing.      Comments: Appears older than stated age   HENT:      Head: Atraumatic.      Right Ear: Tympanic membrane, ear canal and external ear normal.      Left Ear: Tympanic membrane, ear canal and external ear normal.      Nose: Nose normal.      Mouth/Throat:      Mouth: Mucous membranes are moist. No oral lesions.      Pharynx: Posterior oropharyngeal  erythema (diffuse, mild) present.   Eyes:      General: No scleral icterus.     Conjunctiva/sclera: Conjunctivae normal.   Neck:      Thyroid: No thyroid mass.      Vascular: Normal carotid pulses. No carotid bruit.   Cardiovascular:      Rate and Rhythm: Normal rate and regular rhythm.      Pulses: Decreased pulses (pedal).      Heart sounds: Heart sounds are distant.   Pulmonary:      Effort: Pulmonary effort is normal.      Breath sounds: Decreased breath sounds present. No wheezing, rhonchi or rales.   Abdominal:      General: Bowel sounds are normal. There is no distension.      Palpations: There is no mass.      Tenderness: There is no abdominal tenderness.   Musculoskeletal:      Lumbar back: Deformity (loss of normal lordosis), spasms and tenderness (soft tissue left lower back) present. No bony tenderness. Decreased range of motion. Negative right straight leg raise test and negative left straight leg raise test.      Right lower leg: No edema.      Left lower leg: No edema.   Lymphadenopathy:      Cervical: No cervical adenopathy.   Skin:     General: Skin is warm and dry.      Coloration: Skin is not cyanotic, jaundiced or pale.      Findings: No rash.      Nails: There is clubbing.   Neurological:      Mental Status: He is alert and oriented to person, place, and time.      Sensory: Sensation is intact.      Motor: Motor function is intact.      Gait: Gait abnormal (antaglic, slightly flexed).      Deep Tendon Reflexes: Reflexes are normal and symmetric.   Psychiatric:         Mood and Affect: Mood and affect normal.         Behavior: Behavior normal. Behavior is cooperative.         Cognition and Memory: Cognition normal.     Pt's previous physical exam reviewed and updated as indicated.    Lab Results   Component Value Date    GGT 12 08/18/2023       Assessment & Plan   Diagnoses and all orders for this visit:    1. Well adult exam (Primary)  -     PSA Screen    2. Strain of lumbar region, initial  encounter  -     ketorolac (TORADOL) injection 30 mg  -     methylPREDNISolone acetate (DEPO-medrol) injection 80 mg    3. Screening for prostate cancer  -     PSA Screen    4. Vitamin B 12 deficiency  -     Vitamin B12    5. Mixed hyperlipidemia  -     Lipid Panel  -     Comprehensive Metabolic Panel  -     atorvastatin (LIPITOR) 40 MG tablet; Take 1 tablet by mouth Daily.  Dispense: 30 tablet; Refill: 11    6. Essential hypertension  -     Comprehensive Metabolic Panel    7. High alkaline phosphatase  -     Comprehensive Metabolic Panel    Other orders  -     methocarbamol (ROBAXIN) 500 MG tablet; Take 1 tablet by mouth 4 (Four) Times a Day.  Dispense: 120 tablet; Refill: 2       Age appropriate preventive care reviewed including cancer screenings, safety measures, mental health concerns, supplements, prevention of CV disease and DM, etc. Handout provided.    I have reviewed risks/benefits and potential side effects of various treatment optionsf or acute exacerbation of chronic LBP with known l-DDD/DJD. Pt voices understanding and wishes to proceed with trial of POC IM tx as above. Continue muscle relaxants, ice/heat, gentle ROM exercise, ealking.    Assess status of vit/min deficiencies and replace as indicated.    Tobacco cessation advised.  Pt voiced understanding of health risks of cont'd tobacco use. Encouraged to keep apt for lung CA screening chest CT.    Pt advised to eat a heart healthy diet and get regular aerobic exercise.    Routine f/u in 6 months, sooner as needed/instructed.  F/u with specialists as scheduled.  I will contact patient regarding test results and provide instructions regarding any necessary changes in plan of care.  Patient was encouraged to keep me informed of any acute changes, lack of improvement, or any new concerning symptoms.  Pt is aware of reasons to seek emergent care.  Patient voiced understanding of all instructions and denied further questions.    Please note that portions  of this note may have been completed with a voice recognition program.

## 2024-01-16 DIAGNOSIS — R94.5 ABNORMAL LIVER FUNCTION: Primary | ICD-10-CM

## 2024-01-16 PROBLEM — Z79.891 CHRONIC PRESCRIPTION OPIATE USE: Status: ACTIVE | Noted: 2024-01-16

## 2024-01-16 PROBLEM — R74.8 HIGH ALKALINE PHOSPHATASE: Status: ACTIVE | Noted: 2024-01-16

## 2024-01-16 LAB
ALBUMIN SERPL-MCNC: 4.6 G/DL (ref 3.5–5.2)
ALBUMIN/GLOB SERPL: 2.9 G/DL
ALP SERPL-CCNC: 147 U/L (ref 39–117)
ALT SERPL-CCNC: 11 U/L (ref 1–41)
AST SERPL-CCNC: 10 U/L (ref 1–40)
BILIRUB SERPL-MCNC: 1.1 MG/DL (ref 0–1.2)
BUN SERPL-MCNC: 11 MG/DL (ref 8–23)
BUN/CREAT SERPL: 12.4 (ref 7–25)
CALCIUM SERPL-MCNC: 9 MG/DL (ref 8.6–10.5)
CHLORIDE SERPL-SCNC: 101 MMOL/L (ref 98–107)
CHOLEST SERPL-MCNC: 107 MG/DL (ref 0–200)
CO2 SERPL-SCNC: 24.8 MMOL/L (ref 22–29)
CREAT SERPL-MCNC: 0.89 MG/DL (ref 0.76–1.27)
EGFRCR SERPLBLD CKD-EPI 2021: 97.5 ML/MIN/1.73
GLOBULIN SER CALC-MCNC: 1.6 GM/DL
GLUCOSE SERPL-MCNC: 93 MG/DL (ref 65–99)
HDLC SERPL-MCNC: 37 MG/DL (ref 40–60)
LDLC SERPL CALC-MCNC: 55 MG/DL (ref 0–100)
POTASSIUM SERPL-SCNC: 4.3 MMOL/L (ref 3.5–5.2)
PROT SERPL-MCNC: 6.2 G/DL (ref 6–8.5)
PSA SERPL-MCNC: 0.81 NG/ML (ref 0–4)
SODIUM SERPL-SCNC: 137 MMOL/L (ref 136–145)
TRIGL SERPL-MCNC: 70 MG/DL (ref 0–150)
VIT B12 SERPL-MCNC: 610 PG/ML (ref 211–946)
VLDLC SERPL CALC-MCNC: 15 MG/DL (ref 5–40)

## 2024-01-16 RX ORDER — ATORVASTATIN CALCIUM 40 MG/1
40 TABLET, FILM COATED ORAL DAILY
Qty: 30 TABLET | Refills: 11 | Status: SHIPPED | OUTPATIENT
Start: 2024-01-16

## 2024-01-16 RX ORDER — METHOCARBAMOL 500 MG/1
500 TABLET, FILM COATED ORAL 4 TIMES DAILY
Qty: 120 TABLET | Refills: 2 | Status: SHIPPED | OUTPATIENT
Start: 2024-01-16

## 2024-01-31 ENCOUNTER — PATIENT OUTREACH (OUTPATIENT)
Dept: ONCOLOGY | Facility: HOSPITAL | Age: 64
End: 2024-01-31
Payer: COMMERCIAL

## 2024-01-31 ENCOUNTER — HOSPITAL ENCOUNTER (OUTPATIENT)
Dept: CT IMAGING | Facility: HOSPITAL | Age: 64
Discharge: HOME | End: 2024-01-31
Admitting: INTERNAL MEDICINE
Payer: COMMERCIAL

## 2024-01-31 DIAGNOSIS — F17.210 NICOTINE DEPENDENCE, CIGARETTES, UNCOMPLICATED: ICD-10-CM

## 2024-01-31 PROCEDURE — 71271 CT THORAX LUNG CANCER SCR C-: CPT

## 2024-02-28 ENCOUNTER — TELEPHONE (OUTPATIENT)
Dept: FAMILY MEDICINE CLINIC | Facility: CLINIC | Age: 64
End: 2024-02-28

## 2024-02-28 NOTE — TELEPHONE ENCOUNTER
Caller: Gaston Bagley    Relationship: Self    Best call back number: [phone]     What was the call regarding:   PATIENT STATED THAT HE HAS A BLISTER ON THE INSIDE SIDE OF LEFT FOOT AND WOULD LIKE TO BE INFORMED IF MELY YBARRA MD IS WANTING HIM TO COME INTO THE OFFICE TO HAVE A BIOPSY  DONE ON IT OR WHAT SHE WOULD SUGGEST

## 2024-03-05 NOTE — TELEPHONE ENCOUNTER
As this message if now 6 days old, if he still has the blister then, yes, please schedule him for biopsy. Wednesday at 4 (30 min slot for procedure) would work well.

## 2024-03-08 ENCOUNTER — OFFICE VISIT (OUTPATIENT)
Dept: PULMONOLOGY | Facility: CLINIC | Age: 64
End: 2024-03-08
Payer: COMMERCIAL

## 2024-03-08 VITALS
HEART RATE: 66 BPM | SYSTOLIC BLOOD PRESSURE: 128 MMHG | BODY MASS INDEX: 23.86 KG/M2 | HEIGHT: 72 IN | WEIGHT: 176.2 LBS | DIASTOLIC BLOOD PRESSURE: 82 MMHG | OXYGEN SATURATION: 100 % | RESPIRATION RATE: 18 BRPM

## 2024-03-08 DIAGNOSIS — G47.34 NOCTURNAL HYPOXIA: ICD-10-CM

## 2024-03-08 DIAGNOSIS — R06.02 SOB (SHORTNESS OF BREATH): ICD-10-CM

## 2024-03-08 DIAGNOSIS — J43.2 CENTRILOBULAR EMPHYSEMA: ICD-10-CM

## 2024-03-08 DIAGNOSIS — J44.89 ASTHMA WITH COPD: Primary | ICD-10-CM

## 2024-03-08 DIAGNOSIS — F17.210 NICOTINE DEPENDENCE, CIGARETTES, UNCOMPLICATED: ICD-10-CM

## 2024-03-08 RX ORDER — ALBUTEROL SULFATE 90 UG/1
2 AEROSOL, METERED RESPIRATORY (INHALATION) EVERY 4 HOURS PRN
Qty: 18 G | Refills: 3 | Status: SHIPPED | OUTPATIENT
Start: 2024-03-08

## 2024-03-08 RX ORDER — FLUTICASONE FUROATE AND VILANTEROL 200; 25 UG/1; UG/1
1 POWDER RESPIRATORY (INHALATION) DAILY
Qty: 1 EACH | Refills: 5 | Status: SHIPPED | OUTPATIENT
Start: 2024-03-08 | End: 2024-03-08

## 2024-03-08 NOTE — PROGRESS NOTES
"Chief Complaint   Patient presents with    Breathing Problem    Follow-up         Subjective   Gaston Bagley is a 63 y.o. male.   Patient comes today for follow up of shortness of breath and COPD.     Symptoms have been stable since the last clinic visit. Patient reports no recent exacerbations.     Patient is using medications, as prescribed. His insurance did not cover Trelegy so he was given Breo samples by PCP. He uses SHAR inhaler twice a day. He uses Duoneb maybe twice a week.     He has some cough but it is not worse than normal.    Exercise tolerance has also remained stable.     Continues to smoke 1/2 pack per day.     He uses oxygen at night.       The following portions of the patient's history were reviewed and updated as appropriate: allergies, current medications, past family history, past medical history, past social history, and past surgical history.      Review of Systems   HENT:  Negative for sinus pressure, sneezing and sore throat.    Respiratory:  Positive for cough. Negative for chest tightness, shortness of breath and wheezing.    Psychiatric/Behavioral:  Negative for sleep disturbance.        Objective   Visit Vitals  /82   Pulse 66   Resp 18   Ht 182.9 cm (72.01\") Comment: pt reported   Wt 79.9 kg (176 lb 3.2 oz)   SpO2 100%   BMI 23.89 kg/m²       ============================  ============================    6 MINUTE WALK TEST    Gaston Bagley   1960             BASELINE   SpO2%: 100 % RA    Heart Rate 66   Blood Pressure 128/82     EXERCISE SpO2% HEART RATE RA or O2 @ LPM   1 MINUTE 96 94 RA   2 MINUTES 96 92 RA   3 MINUTES 96 91 RA   4 MINUTES 95 96 RA   5 MINUTES 95 96 RA   6 MINUTES 95 97 RA   (Number of laps: 11 X 36 meters + Final partial lap:  meters = 396 meters)            Distance Walked:  396 Meters   SpO2% Post Exercise:  97 %   HR Post Exercise:  68     Reason to stop (if applicable):   ____ Chest Pain   ____ Light Headedness   ____ Dyspnea Unrelieved by Rest   ____ " Abnormal Gait Pattern   ____ Severe Fatigue   ____ Other (Specify: __________________________)    Tech Comments (if any):      Test performed by: LD    ============================  ============================    Physical Exam  Vitals reviewed.   HENT:      Head: Atraumatic.      Mouth/Throat:      Mouth: Mucous membranes are moist.   Eyes:      Extraocular Movements: Extraocular movements intact.   Cardiovascular:      Rate and Rhythm: Normal rate and regular rhythm.   Pulmonary:      Effort: Pulmonary effort is normal. No respiratory distress.      Comments: Somewhat decreased A/E without wheezing.   Musculoskeletal:      Cervical back: Neck supple.      Comments: Gait normal.   Skin:     General: Skin is warm.   Neurological:      Mental Status: He is alert and oriented to person, place, and time.         Assessment & Plan   Diagnoses and all orders for this visit:    1. Asthma with COPD (Primary)    2. Nicotine dependence, cigarettes, uncomplicated    3. Centrilobular emphysema  -     albuterol sulfate HFA (ProAir HFA) 108 (90 Base) MCG/ACT inhaler; Inhale 2 puffs Every 4 (Four) Hours As Needed for Wheezing or Shortness of Air.  Dispense: 18 g; Refill: 3    4. Nocturnal hypoxia    5. SOB (shortness of breath)  -     Converted Six Minute Walk  -     Cancel: XR Chest PA & Lateral; Future    Other orders  -     Discontinue: Fluticasone Furoate-Vilanterol (BREO ELLIPTA) 200-25 MCG/ACT inhaler; Inhale 1 puff Daily. Rinse mouth with water after use.  Dispense: 1 each; Refill: 5  -     Fluticasone-Umeclidin-Vilant (TRELEGY ELLIPTA) 200-62.5-25 MCG/ACT inhaler; Inhale 1 puff Daily.  Dispense: 60 each; Refill: 5           Return in about 8 months (around 11/8/2024) for Recheck, For Dr. Fontenot.    DISCUSSION (if any):  PFT 2021 consistent with moderate obstruction.     On 6-minute walk, he did not display exercise hypoxemia.    It appears Trelegy was approved by insurance from 6/2023-6/2024 and the approval is scanned  into Epic. I will print this approval for the patient to take to pharmacy.     Restart high dose Trelegy and d/c Breo. Continue SHAR as needed.     Compliance with medications stressed.     Side effects of prescribed medications discussed with the patient    Patient was strongly encouraged to quit smoking as soon as possible.    The patient belongs to the risk group for which lung cancer screening has been recommended. He will need to continue annual LDCT and will be due 1/2025.     Study Result    Narrative & Impression   PROCEDURE: CT CHEST LOW DOSE CANCER SCREENING WO-     HISTORY: Lung cancer screening, >= 20 pk-yr smoking history (Age >=  50y); F17.210-Nicotine dependence, cigarettes, uncomplicated     COMPARISON: January 23, 2023     TECHNIQUE: Axial images were obtained from the thoracic inlet through  the upper abdomen per the low-dose protocol. Coronal images were  reformatted. DLP 47.48 mGy.cm; CTDI 1.15 mGy     FINDINGS: There is centrilobular emphysema. There are stable 6 mm or  less bilateral noncalcified pulmonary nodules. There is no airspace  disease. The heart is normal in size. The aorta is normal in caliber.  There are no pleural or pericardial effusions. The visualized upper  abdomen is unremarkable. Bone windows reveal no acute osseous  abnormalities.     IMPRESSION:  Stable bilateral noncalcified pulmonary nodules measuring 6  mm or less. Lung RADS category 2.     RECOMMENDATIONS:Annual low-dose chest CT.           Images were reviewed, interpreted, and dictated by Dr. Stephanie Pereyra MD  Transcribed by Armida Hernandez PA-C.     This report was signed and finalized on 1/31/2024 3:51 PM by Stephanie Pereyra MD.        Dictated utilizing Dragon dictation.    This document was electronically signed by NA Melton March 8, 2024  11:55 EST

## 2024-03-13 ENCOUNTER — HOSPITAL ENCOUNTER (OUTPATIENT)
Dept: ULTRASOUND IMAGING | Facility: HOSPITAL | Age: 64
Discharge: HOME | End: 2024-03-13
Admitting: FAMILY MEDICINE
Payer: COMMERCIAL

## 2024-03-13 DIAGNOSIS — R94.5 ABNORMAL LIVER FUNCTION: ICD-10-CM

## 2024-03-13 PROCEDURE — 76705 ECHO EXAM OF ABDOMEN: CPT

## 2024-03-18 ENCOUNTER — OFFICE VISIT (OUTPATIENT)
Dept: FAMILY MEDICINE CLINIC | Facility: CLINIC | Age: 64
End: 2024-03-18
Payer: COMMERCIAL

## 2024-03-18 VITALS
HEART RATE: 83 BPM | SYSTOLIC BLOOD PRESSURE: 154 MMHG | WEIGHT: 174.2 LBS | BODY MASS INDEX: 23.6 KG/M2 | HEIGHT: 72 IN | DIASTOLIC BLOOD PRESSURE: 92 MMHG | OXYGEN SATURATION: 99 %

## 2024-03-18 DIAGNOSIS — L98.9 SKIN LESION OF FOOT: Primary | ICD-10-CM

## 2024-03-18 PROCEDURE — 3080F DIAST BP >= 90 MM HG: CPT | Performed by: FAMILY MEDICINE

## 2024-03-18 PROCEDURE — 3077F SYST BP >= 140 MM HG: CPT | Performed by: FAMILY MEDICINE

## 2024-03-18 PROCEDURE — 99213 OFFICE O/P EST LOW 20 MIN: CPT | Performed by: FAMILY MEDICINE

## 2024-03-18 RX ORDER — PREDNISONE 20 MG/1
20 TABLET ORAL DAILY
Qty: 5 TABLET | Refills: 0 | Status: SHIPPED | OUTPATIENT
Start: 2024-03-18 | End: 2024-03-23

## 2024-03-18 NOTE — PROGRESS NOTES
Subjective   Gaston Bagley is a 63 y.o. male.     History of Present Illness  Here with c/o recurrent blisters left foot. Seen for same previously. Resolved with topical steroid. Now does not seem as effective. No injury to area. No pain. Some itching.    The following portions of the patient's history were reviewed and updated as appropriate: allergies, current medications, past family history, past medical history, past social history, past surgical history, and problem list.    Review of Systems   Constitutional:  Positive for fatigue. Negative for fever and unexpected weight change.   HENT:  Negative for congestion, ear pain, hearing loss, nosebleeds, rhinorrhea, sneezing, sore throat, tinnitus and trouble swallowing.    Eyes:  Negative for pain, discharge, redness and visual disturbance.   Respiratory:  Positive for cough, shortness of breath and wheezing. Negative for chest tightness.    Cardiovascular:  Positive for palpitations. Negative for chest pain and leg swelling.   Gastrointestinal:  Negative for abdominal pain, blood in stool, constipation, diarrhea, nausea and vomiting.   Endocrine: Negative for cold intolerance, heat intolerance, polydipsia and polyuria.        Night sweats   Genitourinary:  Negative for dysuria, flank pain, frequency, hematuria and urgency.   Musculoskeletal:  Positive for arthralgias, back pain and myalgias. Negative for joint swelling and neck pain.   Skin:  Positive for rash. Negative for wound.   Neurological:  Positive for weakness (generalized). Negative for dizziness, tremors, seizures, syncope, speech difficulty, numbness and headaches.   Hematological:  Negative for adenopathy. Bruises/bleeds easily.   Psychiatric/Behavioral:  Positive for dysphoric mood and sleep disturbance. Negative for confusion and suicidal ideas. The patient is nervous/anxious.    Pt's previous ROS reviewed and updated as indicated.       Objective   Vitals:    03/18/24 1114   BP: 154/92   Pulse: 83    SpO2: 99%     Body mass index is 23.63 kg/m².      03/18/24  1114   Weight: 79 kg (174 lb 3.2 oz)       Physical Exam  Vitals and nursing note reviewed.   Constitutional:       General: He is not in acute distress.     Appearance: He is not ill-appearing.      Comments: Appears older than stated age   Eyes:      General: No scleral icterus.     Conjunctiva/sclera: Conjunctivae normal.   Cardiovascular:      Pulses: Decreased pulses (pedal).   Pulmonary:      Effort: Pulmonary effort is normal.   Musculoskeletal:      Right lower leg: No edema.      Left lower leg: No edema.   Skin:     General: Skin is warm and dry.      Coloration: Skin is not cyanotic, jaundiced or pale.      Findings: Rash present.      Nails: There is clubbing.      Comments: Lesion plantar medial midfoot on left is raised, asymmetric, non-pigmented, vascular appearing, NTTP. - similar lesion between 1st and 3 nd digits   Neurological:      Gait: Gait is intact.         Assessment & Plan   Diagnoses and all orders for this visit:    1. Skin lesion of foot (Primary)    Other orders  -     predniSONE (DELTASONE) 20 MG tablet; Take 1 tablet by mouth Daily for 5 days.  Dispense: 5 tablet; Refill: 0       Trial of oral prednisone.  Return at earliest convenience for skin biopsy.  Patient was encouraged to keep me informed of any acute changes, lack of improvement, or any new concerning symptoms.  Pt is aware of reasons to seek emergent care.  Patient voiced understanding of all instructions and denied further questions.    Please note that portions of this note may have been completed with a voice recognition program.

## 2024-03-19 ENCOUNTER — PROCEDURE VISIT (OUTPATIENT)
Dept: FAMILY MEDICINE CLINIC | Facility: CLINIC | Age: 64
End: 2024-03-19
Payer: COMMERCIAL

## 2024-03-19 VITALS
BODY MASS INDEX: 23.68 KG/M2 | DIASTOLIC BLOOD PRESSURE: 88 MMHG | OXYGEN SATURATION: 97 % | HEART RATE: 77 BPM | SYSTOLIC BLOOD PRESSURE: 140 MMHG | HEIGHT: 72 IN | WEIGHT: 174.8 LBS

## 2024-03-19 DIAGNOSIS — L98.9 SKIN LESION OF FOOT: Primary | ICD-10-CM

## 2024-03-19 DIAGNOSIS — S90.829D BLISTER OF FOOT, UNSPECIFIED LATERALITY, SUBSEQUENT ENCOUNTER: ICD-10-CM

## 2024-03-19 NOTE — PROGRESS NOTES
Subjective   Gaston Bagley is a 63 y.o. male.     History of Present Illness  Here for biopsy of recurrent skin lesion left foot. Previously resolved with topical steroids but recur. Some pruritus. Have previously not responded to antifungal meds or antibiotics.    The following portions of the patient's history were reviewed and updated as appropriate: allergies, current medications, past family history, past medical history, past social history, past surgical history, and problem list.    Review of Systems   Constitutional:  Positive for fatigue. Negative for fever and unexpected weight change.   HENT:  Negative for congestion, ear pain, hearing loss, nosebleeds, rhinorrhea, sneezing, sore throat, tinnitus and trouble swallowing.    Eyes:  Negative for pain, discharge, redness and visual disturbance.   Respiratory:  Positive for cough, shortness of breath and wheezing. Negative for chest tightness.    Cardiovascular:  Positive for palpitations. Negative for chest pain and leg swelling.   Gastrointestinal:  Negative for abdominal pain, blood in stool, constipation, diarrhea, nausea and vomiting.   Endocrine: Negative for cold intolerance, heat intolerance, polydipsia and polyuria.        Night sweats   Genitourinary:  Negative for dysuria, flank pain, frequency, hematuria and urgency.   Musculoskeletal:  Positive for arthralgias, back pain, gait problem (due to back pain) and myalgias. Negative for joint swelling and neck pain.   Skin:  Positive for rash. Negative for wound.   Neurological:  Positive for weakness (generalized). Negative for dizziness, tremors, seizures, syncope, speech difficulty, numbness and headaches.   Hematological:  Negative for adenopathy. Bruises/bleeds easily.   Psychiatric/Behavioral:  Positive for dysphoric mood and sleep disturbance. Negative for confusion and suicidal ideas. The patient is nervous/anxious.    Pt's previous ROS reviewed and updated as indicated.         Objective    Vitals:    03/19/24 1050   BP: 140/88   Pulse: 77   SpO2: 97%     Body mass index is 23.71 kg/m².      03/19/24  1050   Weight: 79.3 kg (174 lb 12.8 oz)           Physical Exam  Vitals and nursing note reviewed.   Constitutional:       General: He is not in acute distress.     Appearance: He is not ill-appearing.      Comments: Appears older than stated age   Eyes:      General: No scleral icterus.     Conjunctiva/sclera: Conjunctivae normal.   Cardiovascular:      Pulses: Decreased pulses (pedal).   Pulmonary:      Effort: Pulmonary effort is normal.   Musculoskeletal:      Right lower leg: No edema.      Left lower leg: No edema.   Skin:     General: Skin is warm and dry.      Findings: Rash present.      Nails: There is clubbing.      Comments: Lesion plantar medial midfoot on left foot- raised, asymmetric with irregular borders, non-pigmented, somewhat vascular appearing, NTTP, friable    Lesion between 1st and 2nd digits left foot more plantar surface- regular border, generally symmetric, somewhat vesicular, nonpigmented, NTTP. No drainage, no cellulitis.   Neurological:      Mental Status: He is alert and oriented to person, place, and time.      Gait: Gait is intact.   Psychiatric:         Behavior: Behavior is cooperative.     Pt's previous physical exam reviewed and updated as indicated.      Assessment & Plan   Diagnoses and all orders for this visit:    1. Skin lesion of foot (Primary)  -     TISSUE EXAM, [facility] ([facility]); Future  -     TISSUE EXAM, [facility] ([facility])    2. Blister of foot, unspecified laterality, subsequent encounter       I have reviewed risks/benefits and potential side effects of various treatment and diagnostic options. Pt voices understanding and wishes to proceed with punch biopsy as previously discussed to assist in diagnosis.    PROCEDURE NOTE  Reasons for procedure: recurring lesions    Physical Exam Findings  Location: left foot, 2 sites    Informed consent  obtained  Time out protocol performed prior to procedure    Biopsy Type: punch    Each site prepped with betadine and sterile conditions maintained.    Each site anesthetized with 1% Lidocaine without Epinephrine  Total amount of Lidocaine used 4 ml    Size punch used: 3 mm x 2  Closure: attempted closer with 4-0 nylon but tissue was too friable. Hemostasis obtained with pressure.     Specimen x 2 sent to pathology.    Complications: none    Pt tolerated procedure well.  Sterile dressing applied.  Wound care instructions provided.    Routine f/u as scheduled, f/u sooner as needed/instructed.  I will contact patient regarding test results and provide instructions regarding any necessary changes in plan of care.  Patient was encouraged to keep me informed of any acute changes, lack of improvement, or any new concerning symptoms.  Pt is aware of reasons to seek emergent care.  Patient voiced understanding of all instructions and denied further questions.    Please note that portions of this note may have been completed with a voice recognition program.

## 2024-03-20 RX ORDER — CARVEDILOL 12.5 MG/1
12.5 TABLET ORAL 2 TIMES DAILY WITH MEALS
Qty: 180 TABLET | Refills: 2 | Status: SHIPPED | OUTPATIENT
Start: 2024-03-20

## 2024-03-20 RX ORDER — PREDNISONE 20 MG/1
20 TABLET ORAL DAILY
Qty: 5 TABLET | Refills: 0 | OUTPATIENT
Start: 2024-03-20 | End: 2024-03-25

## 2024-03-20 RX ORDER — METHOCARBAMOL 500 MG/1
500 TABLET, FILM COATED ORAL 4 TIMES DAILY
Qty: 120 TABLET | Refills: 2 | Status: SHIPPED | OUTPATIENT
Start: 2024-03-20

## 2024-03-22 DIAGNOSIS — G25.81 RLS (RESTLESS LEGS SYNDROME): ICD-10-CM

## 2024-03-25 LAB — REF LAB TEST METHOD: NORMAL

## 2024-03-25 RX ORDER — ROPINIROLE 0.25 MG/1
0.25 TABLET, FILM COATED ORAL 3 TIMES DAILY
Qty: 90 TABLET | Refills: 0 | Status: SHIPPED | OUTPATIENT
Start: 2024-03-25

## 2024-06-04 ENCOUNTER — TELEPHONE (OUTPATIENT)
Dept: FAMILY MEDICINE CLINIC | Facility: CLINIC | Age: 64
End: 2024-06-04

## 2024-06-04 NOTE — TELEPHONE ENCOUNTER
Caller: Gaston Bagley    Relationship: Self    Best call back number: [phone]    What form or medical record are you requesting: FACE TO FACE SHEET FOR OXYGEN    Who is requesting this form or medical record from you: SUMAN    How would you like to receive the form or medical records (pick-up, mail, fax): FAX    If fax, what is the fax number:[phone]    Timeframe paperwork needed: ASAP

## 2024-06-05 NOTE — TELEPHONE ENCOUNTER
Spoke to pt to see what form they are requesting as we have not received anything from them. Calling [employer] to see what forms they are needing so they can be faxed

## 2024-07-20 DIAGNOSIS — G25.81 RLS (RESTLESS LEGS SYNDROME): ICD-10-CM

## 2024-07-20 DIAGNOSIS — J43.2 CENTRILOBULAR EMPHYSEMA: ICD-10-CM

## 2024-07-22 RX ORDER — ALBUTEROL SULFATE 90 UG/1
2 AEROSOL, METERED RESPIRATORY (INHALATION) EVERY 4 HOURS PRN
Qty: 18 G | Refills: 3 | Status: SHIPPED | OUTPATIENT
Start: 2024-07-22

## 2024-07-23 RX ORDER — ROPINIROLE 0.25 MG/1
0.25 TABLET, FILM COATED ORAL 3 TIMES DAILY
Qty: 90 TABLET | Refills: 0 | Status: SHIPPED | OUTPATIENT
Start: 2024-07-23

## 2024-07-24 ENCOUNTER — OFFICE VISIT (OUTPATIENT)
Dept: FAMILY MEDICINE CLINIC | Facility: CLINIC | Age: 64
End: 2024-07-24
Payer: COMMERCIAL

## 2024-07-24 VITALS
OXYGEN SATURATION: 95 % | HEIGHT: 72 IN | SYSTOLIC BLOOD PRESSURE: 146 MMHG | WEIGHT: 177 LBS | DIASTOLIC BLOOD PRESSURE: 94 MMHG | HEART RATE: 71 BPM | BODY MASS INDEX: 23.98 KG/M2

## 2024-07-24 DIAGNOSIS — I47.10 PAROXYSMAL SVT (SUPRAVENTRICULAR TACHYCARDIA): ICD-10-CM

## 2024-07-24 DIAGNOSIS — G89.29 CHRONIC PAIN OF LEFT KNEE: ICD-10-CM

## 2024-07-24 DIAGNOSIS — E53.8 VITAMIN B 12 DEFICIENCY: ICD-10-CM

## 2024-07-24 DIAGNOSIS — E78.2 MIXED HYPERLIPIDEMIA: ICD-10-CM

## 2024-07-24 DIAGNOSIS — Z79.1 ENCOUNTER FOR MONITORING CHRONIC NSAID THERAPY: ICD-10-CM

## 2024-07-24 DIAGNOSIS — Z51.81 ENCOUNTER FOR MONITORING CHRONIC NSAID THERAPY: ICD-10-CM

## 2024-07-24 DIAGNOSIS — G47.34 NOCTURNAL HYPOXIA: ICD-10-CM

## 2024-07-24 DIAGNOSIS — K21.9 GASTROESOPHAGEAL REFLUX DISEASE WITHOUT ESOPHAGITIS: ICD-10-CM

## 2024-07-24 DIAGNOSIS — M25.562 CHRONIC PAIN OF LEFT KNEE: ICD-10-CM

## 2024-07-24 DIAGNOSIS — F17.200 TOBACCO USE DISORDER: ICD-10-CM

## 2024-07-24 DIAGNOSIS — I10 ESSENTIAL HYPERTENSION: Primary | ICD-10-CM

## 2024-07-24 DIAGNOSIS — R06.02 SHORTNESS OF BREATH: ICD-10-CM

## 2024-07-24 DIAGNOSIS — E55.9 VITAMIN D DEFICIENCY: ICD-10-CM

## 2024-07-24 DIAGNOSIS — Z99.81 DEPENDENCE ON NOCTURNAL OXYGEN THERAPY: ICD-10-CM

## 2024-07-24 DIAGNOSIS — J44.9 CHRONIC OBSTRUCTIVE PULMONARY DISEASE, UNSPECIFIED COPD TYPE: ICD-10-CM

## 2024-07-24 DIAGNOSIS — E04.2 MULTIPLE THYROID NODULES: ICD-10-CM

## 2024-07-24 DIAGNOSIS — R79.9 ABNORMAL BLOOD CHEMISTRY: ICD-10-CM

## 2024-07-24 PROBLEM — Z28.21 COVID-19 VACCINATION DECLINED: Status: RESOLVED | Noted: 2022-11-18 | Resolved: 2024-07-24

## 2024-07-24 PROBLEM — Z86.16 HISTORY OF 2019 NOVEL CORONAVIRUS DISEASE (COVID-19): Status: RESOLVED | Noted: 2020-12-09 | Resolved: 2024-07-24

## 2024-07-24 RX ORDER — VALSARTAN 40 MG/1
40 TABLET ORAL DAILY
Qty: 30 TABLET | Refills: 5 | Status: SHIPPED | OUTPATIENT
Start: 2024-07-24

## 2024-07-24 RX ORDER — METHOCARBAMOL 500 MG/1
500 TABLET, FILM COATED ORAL 4 TIMES DAILY
Qty: 120 TABLET | Refills: 5 | Status: SHIPPED | OUTPATIENT
Start: 2024-07-24

## 2024-07-24 NOTE — PROGRESS NOTES
Subjective   Gaston Bagley is a 63 y.o. male.     History of Present Illness  Here for routine f/u on several chronic med problems.    Also c/o left knee pain, anterior. Present x 2 months at least. Worsening. No h/o injury/trauma. No change in activity. Icy hot no help.    He has noticed his BP running higher at home.     He c/o Low energy, fatigue.    Taking his routine meds for chronic conditions as instructed.  HTN - coreg  HLP - lipitor  Followed by cardiology for WPW, SVT, moderate TR.  COPD - albuterol prn, fluticasone/umeclidin/vilant, duoneb prn. On nocturnal oxygen  GERD - on protonix. No dysphagia, abd pain, weight loss or blood in stool    Pt's previous HPI reviewed and updated as indicated.       The following portions of the patient's history were reviewed and updated as appropriate: allergies, current medications, past family history, past medical history, past social history, past surgical history, and problem list.    Review of Systems   Constitutional:  Positive for fatigue. Negative for fever and unexpected weight change.   HENT:  Negative for congestion, ear pain, hearing loss, nosebleeds, rhinorrhea, sneezing, sore throat, tinnitus and trouble swallowing.    Eyes:  Negative for pain, discharge, redness and visual disturbance.   Respiratory:  Positive for cough, shortness of breath and wheezing. Negative for chest tightness.    Cardiovascular:  Positive for palpitations. Negative for chest pain and leg swelling.   Gastrointestinal:  Negative for abdominal pain, blood in stool, constipation, diarrhea, nausea and vomiting.   Endocrine: Negative for cold intolerance, heat intolerance, polydipsia and polyuria.        Night sweats   Genitourinary:  Negative for dysuria, flank pain, frequency, hematuria and urgency.   Musculoskeletal:  Positive for arthralgias, back pain, gait problem (due to back pain) and myalgias. Negative for joint swelling and neck pain.   Skin:  Negative for rash and wound.    Neurological:  Positive for weakness (generalized). Negative for dizziness, tremors, seizures, syncope, speech difficulty, numbness and headaches.   Hematological:  Negative for adenopathy. Bruises/bleeds easily.   Psychiatric/Behavioral:  Positive for dysphoric mood and sleep disturbance. Negative for confusion and suicidal ideas. The patient is nervous/anxious.    Pt's previous ROS reviewed and updated as indicated.       Objective   Vitals:    07/24/24 1546   BP: 146/94   Pulse: 71   SpO2: 95%     Body mass index is 24.01 kg/m².      07/24/24  1546   Weight: 80.3 kg (177 lb)       Physical Exam  Vitals and nursing note reviewed.   Constitutional:       General: He is not in acute distress.     Appearance: He is well-groomed and normal weight. He is not ill-appearing.      Comments: Appears older than stated age   HENT:      Head: Atraumatic.      Mouth/Throat:      Mouth: Mucous membranes are moist.   Eyes:      General: No scleral icterus.     Conjunctiva/sclera: Conjunctivae normal.   Neck:      Thyroid: No thyroid mass.      Vascular: Normal carotid pulses. No carotid bruit.   Cardiovascular:      Rate and Rhythm: Normal rate and regular rhythm.      Pulses: Decreased pulses (pedal).      Heart sounds: Heart sounds are distant.   Pulmonary:      Effort: Pulmonary effort is normal.      Breath sounds: Decreased breath sounds present. No wheezing, rhonchi or rales.   Musculoskeletal:      Left knee: Bony tenderness (anterior) present. No effusion. Decreased range of motion (mildly). Tenderness present over the patellar tendon.      Right lower leg: No edema.      Left lower leg: No edema.   Lymphadenopathy:      Cervical: No cervical adenopathy.   Skin:     General: Skin is warm and dry.      Coloration: Skin is not cyanotic, jaundiced or pale.      Findings: No rash.      Nails: There is clubbing.   Neurological:      Mental Status: He is alert and oriented to person, place, and time.      Gait: Gait is  intact.   Psychiatric:         Mood and Affect: Mood and affect normal.         Behavior: Behavior normal. Behavior is cooperative.         Cognition and Memory: Cognition normal.     Pt's previous physical exam reviewed and updated as indicated.      Assessment & Plan   Diagnoses and all orders for this visit:    1. Essential hypertension (Primary)  -     CBC & Differential  -     Comprehensive Metabolic Panel  -     TSH Rfx On Abnormal To Free T4  -     proBNP  -     valsartan (Diovan) 40 MG tablet; Take 1 tablet by mouth Daily.  Dispense: 30 tablet; Refill: 5    2. Paroxysmal SVT (supraventricular tachycardia)  -     CBC & Differential  -     Comprehensive Metabolic Panel  -     TSH Rfx On Abnormal To Free T4    3. Mixed hyperlipidemia  -     Comprehensive Metabolic Panel    4. Vitamin D deficiency  -     Vitamin D,25-Hydroxy    5. Vitamin B 12 deficiency  -     Vitamin B12    6. Gastroesophageal reflux disease without esophagitis    7. Encounter for monitoring chronic NSAID therapy    8. Tobacco use disorder    9. Nocturnal hypoxia  -     proBNP    10. Chronic obstructive pulmonary disease, unspecified COPD type  -     CBC & Differential    11. Dependence on nocturnal oxygen therapy    12. Chronic pain of left knee  -     Cancel: XR Knee 1 or 2 View Right (In Office)  -     XR Knee 1 or 2 View Left (In Office)  -     Diclofenac Sodium (VOLTAREN) 1 % gel gel; Apply 4 g topically to the appropriate area as directed 4 (Four) Times a Day As Needed (knee pain).  Dispense: 100 g; Refill: 5    13. Shortness of breath  -     CBC & Differential  -     Comprehensive Metabolic Panel  -     proBNP    14. Abnormal blood chemistry  -     Hemoglobin A1c    15. Multiple thyroid nodules  -     US Thyroid; Future    Other orders  -     methocarbamol (ROBAXIN) 500 MG tablet; Take 1 tablet by mouth 4 (Four) Times a Day.  Dispense: 120 tablet; Refill: 5       Assess status of vit/min deficiencies and replace as indicated.    Xray  left knee, trial of topical NSAID.    HTN uncontrolled - continue coreg (also for PSVT), add ARB.    HLP - continue lipitor, ASA    COPD - generally stable. Continue nocturnal oxygen, inhalers. Tobacco cessation advised.  Pt voiced understanding of health risks of cont'd tobacco use.    Routine f/u in 1 month for BP recheck and full physical. F/u sooner as needed/instructed.  I will contact patient regarding test results and provide instructions regarding any necessary changes in plan of care.  Patient was encouraged to keep me informed of any acute changes, lack of improvement, or any new concerning symptoms.  Pt is aware of reasons to seek emergent care.  Patient voiced understanding of all instructions and denied further questions.    Please note that portions of this note may have been completed with a voice recognition program.

## 2024-07-26 RX ORDER — CARVEDILOL 12.5 MG/1
12.5 TABLET ORAL 2 TIMES DAILY WITH MEALS
Qty: 180 TABLET | Refills: 2 | Status: SHIPPED | OUTPATIENT
Start: 2024-07-26

## 2024-07-26 NOTE — TELEPHONE ENCOUNTER
Caller: Gaston Bagley    Relationship: Self    Best call back number: [phone]     Requested Prescriptions:   Requested Prescriptions     Pending Prescriptions Disp Refills    carvedilol (COREG) 12.5 MG tablet 180 tablet 2     Sig: Take 1 tablet by mouth 2 (Two) Times a Day With Meals.        Pharmacy where request should be sent: [facility] ([place]) - [address], [address] - [phone] [facility] [phone] [facility]     Last office visit with prescribing clinician: 7/24/2024   Last telemedicine visit with prescribing clinician: Visit date not found   Next office visit with prescribing clinician: 1/27/2025     PATIENT IS OUT OF MEDICATION   Does the patient have less than a 3 day supply:  [x] Yes  [] No      Sarah Lucas Rep   07/26/24 09:11 EDT

## 2024-07-29 DIAGNOSIS — M17.12 PRIMARY OSTEOARTHRITIS OF LEFT KNEE: Primary | ICD-10-CM

## 2024-08-06 ENCOUNTER — OFFICE VISIT (OUTPATIENT)
Dept: ORTHOPEDIC SURGERY | Facility: CLINIC | Age: 64
End: 2024-08-06
Payer: COMMERCIAL

## 2024-08-06 VITALS
SYSTOLIC BLOOD PRESSURE: 146 MMHG | BODY MASS INDEX: 24.19 KG/M2 | DIASTOLIC BLOOD PRESSURE: 90 MMHG | HEIGHT: 72 IN | WEIGHT: 178.6 LBS

## 2024-08-06 DIAGNOSIS — M25.562 ARTHRALGIA OF LEFT KNEE: Primary | ICD-10-CM

## 2024-08-06 DIAGNOSIS — M17.12 PRIMARY OSTEOARTHRITIS OF LEFT KNEE: ICD-10-CM

## 2024-08-06 PROCEDURE — 99203 OFFICE O/P NEW LOW 30 MIN: CPT | Performed by: PHYSICIAN ASSISTANT

## 2024-08-06 PROCEDURE — 1160F RVW MEDS BY RX/DR IN RCRD: CPT | Performed by: PHYSICIAN ASSISTANT

## 2024-08-06 PROCEDURE — 3080F DIAST BP >= 90 MM HG: CPT | Performed by: PHYSICIAN ASSISTANT

## 2024-08-06 PROCEDURE — 1159F MED LIST DOCD IN RCRD: CPT | Performed by: PHYSICIAN ASSISTANT

## 2024-08-06 PROCEDURE — 20610 DRAIN/INJ JOINT/BURSA W/O US: CPT | Performed by: PHYSICIAN ASSISTANT

## 2024-08-06 PROCEDURE — 3077F SYST BP >= 140 MM HG: CPT | Performed by: PHYSICIAN ASSISTANT

## 2024-08-06 RX ORDER — METHYLPREDNISOLONE ACETATE 40 MG/ML
40 INJECTION, SUSPENSION INTRA-ARTICULAR; INTRALESIONAL; INTRAMUSCULAR; SOFT TISSUE
Status: COMPLETED | OUTPATIENT
Start: 2024-08-06 | End: 2024-08-06

## 2024-08-06 RX ORDER — LIDOCAINE HYDROCHLORIDE 20 MG/ML
2 INJECTION, SOLUTION INFILTRATION; PERINEURAL
Status: COMPLETED | OUTPATIENT
Start: 2024-08-06 | End: 2024-08-06

## 2024-08-06 RX ADMIN — LIDOCAINE HYDROCHLORIDE 2 ML: 20 INJECTION, SOLUTION INFILTRATION; PERINEURAL at 09:11

## 2024-08-06 RX ADMIN — METHYLPREDNISOLONE ACETATE 40 MG: 40 INJECTION, SUSPENSION INTRA-ARTICULAR; INTRALESIONAL; INTRAMUSCULAR; SOFT TISSUE at 09:11

## 2024-08-06 NOTE — PROGRESS NOTES
Subjective   Patient ID: Gaston Bagley is a 63 y.o. right hand dominant male is being seen for orthopaedic evaluation today for left knee pain   Pain of the Left Knee (Reports pain for 3 months, no known injury. Pain is worse after prolonged sitting. )             Patient presents with Left knee pain x 3 months without injury or trauma.  He notices the discomfort when going from sitting to standing.  Denies instability or locking.  He has tried Voltaren gel which helps minimally.                                                      Past Medical History:   Diagnosis Date    Arthritis     COPD (chronic obstructive pulmonary disease)     Hyperlipidemia     Hypertension     Right cornea abrasion     WPW (Kalyani-Parkinson-White syndrome)         Past Surgical History:   Procedure Laterality Date    BACK SURGERY  2018    L4-L5 lumbar fusion Dr. Carlos Enrique Moore    CARDIAC CATHETERIZATION  01/28/2020    FACIAL RECONSTRUCTION SURGERY      SPINAL CORD STIMULATOR IMPLANT N/A 5/20/2021    Procedure: SPINAL CORD STIMULATOR INSERTION PHASE 2;  Surgeon: Edson Figueroa DO;  Location: [facility] OR;  Service: Pain Management;  Laterality: N/A;    SPINAL CORD STIMULATOR REMOVAL N/A 7/26/2022    Procedure: SPINAL CORD STIMULATOR REMOVAL;  Surgeon: Edson Figueroa DO;  Location: [facility] OR;  Service: Pain Management;  Laterality: N/A;       Family History   Problem Relation Age of Onset    Heart attack Mother 62    Stroke Mother 75    Arthritis Father     Arthritis Brother     Heart attack Brother 58    Hypertension Brother     Arthritis Brother     Arthritis Brother     Hypertension Brother     Throat cancer Brother     Arthritis Brother     Hypertension Brother     Arthritis Brother     Stroke Brother 45        Social History     Socioeconomic History    Marital status:    Tobacco Use    Smoking status: Every Day     Current packs/day: 0.50     Average packs/day: 0.5 packs/day for 47.6 years (23.8 ttl pk-yrs)     Types:  "Cigarettes     Start date: 1977     Passive exposure: Current    Smokeless tobacco: Never    Tobacco comments:     still smokes about half pack a day    Vaping Use    Vaping status: Never Used   Substance and Sexual Activity    Alcohol use: Not Currently    Drug use: Never    Sexual activity: Not Currently     Partners: Female       No Known Allergies    Review of Systems   Musculoskeletal:  Positive for arthralgias (left knee).   All other systems reviewed and are negative.      Objective   /90 (BP Location: Left arm, Patient Position: Sitting, Cuff Size: Adult)   Ht 182.9 cm (72\")   Wt 81 kg (178 lb 9.6 oz)   BMI 24.22 kg/m²   Physical Exam  Vitals and nursing note reviewed.   Constitutional:       Appearance: Normal appearance.   Pulmonary:      Effort: Pulmonary effort is normal.   Musculoskeletal:      Left knee: Bony tenderness and crepitus present. No erythema or ecchymosis. Tenderness present over the medial joint line and lateral joint line. No LCL laxity, MCL laxity or ACL laxity.Normal meniscus.   Neurological:      Mental Status: He is alert and oriented to person, place, and time.       Ortho Exam  Extremity DVT signs are negative on physical exam with negative Aissatou sign, no calf pain, no palpable cords, and no skin tone change   Neurologic Exam     Mental Status   Oriented to person, place, and time.            Assessment & Plan   Independent Review of Radiographic Studies:    No new imaging done today.  Narrative & Impression   PROCEDURE: XR KNEE 1 OR 2 VW LEFT-     HISTORY: chronic left knee pain; M25.562-Pain in left knee; G89.29-Other  chronic pain     COMPARISON: None.     FINDINGS:  A two view exam demonstrates no acute fracture or  dislocation. The joint spaces demonstrate moderate narrowing of the  medial joint compartment and mild narrowing of the patellofemoral  compartment. There are tiny patellar osteophytes as well as a small left  knee effusion. No soft tissue abnormality is " seen.     IMPRESSION:  No acute bony abnormality.     Degenerative change with small left effusion.           This report was signed and finalized on 7/24/2024 5:11 PM by Stephanie Pereyra MD.       I have personally reviewed the Xray images 2 view         Large Joint Arthrocentesis: L knee  Date/Time: 8/6/2024 9:11 AM  Consent given by: patient  Site marked: site marked  Timeout: Immediately prior to procedure a time out was called to verify the correct patient, procedure, equipment, support staff and site/side marked as required   Supporting Documentation  Indications: pain   Procedure Details  Location: knee - L knee  Preparation: Patient was prepped and draped in the usual sterile fashion  Needle size: 22 G  Approach: anterolateral  Medications administered: 2 mL lidocaine 2%; 40 mg methylPREDNISolone acetate 40 MG/ML  Patient tolerance: patient tolerated the procedure well with no immediate complications            Diagnoses and all orders for this visit:    1. Arthralgia of left knee (Primary)  -     XR Knee 1 or 2 View Left; Future    2. Primary osteoarthritis of left knee    Other orders  -     Large Joint Arthrocentesis       Discussion of orthopedic goals  Orthopedic activities reviewed and patient expressed appreciation  Regular exercise as tolerated  Risk, benefits, and merits of treatment alternatives reviewed with the patient and questions answered  Call or notify for any adverse effect from injection therapy    Recommendations/Plan:  Exercise, medications, injections, other patient advice, and return appointment as noted.  Patient is encouraged to call or return for any issues or concerns.  I instructed patient on proper home exercises for quad/VMO strengthening to be performed daily for the next 8 weeks.  If no improvement in his symptoms after today's injection and the home exercises consider MRI of the left knee.  Contact our clinic if there has been no improvement.  Otherwise, follow-up in 3  months  Patient agreeable to call or return sooner for any concerns.  Continue to use the voltaren gel 3 x daily as needed.    BMI is within normal parameters. No other follow-up for BMI required.

## 2024-08-12 ENCOUNTER — PATIENT ROUNDING (BHMG ONLY) (OUTPATIENT)
Dept: ORTHOPEDIC SURGERY | Facility: CLINIC | Age: 64
End: 2024-08-12
Payer: COMMERCIAL

## 2024-08-12 NOTE — PROGRESS NOTES
"August 12, 2024    Hello, may I speak with Gaston Bagley?    My name is Ramya      I am  with [facility]  [address] [place] [id number] [phone].    Before we get started may I verify your date of birth? 1960    I am calling to officially welcome you to our practice and ask about your recent visit. Is this a good time to talk? yes    Tell me about your visit with us. What things went well?  \"everything was great\"       We're always looking for ways to make our patients' experiences even better. Do you have recommendations on ways we may improve?no      Overall were you satisfied with your first visit to our practice? yes        I appreciate you taking the time to speak with me today. Is there anything else I can do for you? no      Thank you, and have a great day.      "

## 2024-08-27 DIAGNOSIS — I10 ESSENTIAL HYPERTENSION: ICD-10-CM

## 2024-08-30 RX ORDER — ASPIRIN 81 MG/1
81 TABLET, CHEWABLE ORAL DAILY
Qty: 30 TABLET | Refills: 3 | Status: SHIPPED | OUTPATIENT
Start: 2024-08-30

## 2024-08-30 RX ORDER — VALSARTAN 40 MG/1
40 TABLET ORAL DAILY
Qty: 30 TABLET | Refills: 5 | Status: SHIPPED | OUTPATIENT
Start: 2024-08-30

## 2024-08-30 RX ORDER — LANOLIN ALCOHOL/MO/W.PET/CERES
1000 CREAM (GRAM) TOPICAL DAILY
Qty: 30 TABLET | Refills: 2 | Status: SHIPPED | OUTPATIENT
Start: 2024-08-30

## 2024-09-25 ENCOUNTER — HOSPITAL ENCOUNTER (OUTPATIENT)
Dept: ULTRASOUND IMAGING | Facility: HOSPITAL | Age: 64
Discharge: HOME | End: 2024-09-25
Admitting: FAMILY MEDICINE
Payer: COMMERCIAL

## 2024-09-25 DIAGNOSIS — G25.81 RLS (RESTLESS LEGS SYNDROME): ICD-10-CM

## 2024-09-25 DIAGNOSIS — E04.2 MULTIPLE THYROID NODULES: ICD-10-CM

## 2024-09-25 PROCEDURE — 76536 US EXAM OF HEAD AND NECK: CPT

## 2024-09-26 RX ORDER — ROPINIROLE 0.25 MG/1
0.25 TABLET, FILM COATED ORAL 3 TIMES DAILY
Qty: 90 TABLET | Refills: 0 | Status: SHIPPED | OUTPATIENT
Start: 2024-09-26

## 2024-09-26 RX ORDER — METHOCARBAMOL 500 MG/1
500 TABLET, FILM COATED ORAL 4 TIMES DAILY
Qty: 120 TABLET | Refills: 5 | Status: SHIPPED | OUTPATIENT
Start: 2024-09-26

## 2024-10-07 ENCOUNTER — FLU SHOT (OUTPATIENT)
Dept: FAMILY MEDICINE CLINIC | Facility: CLINIC | Age: 64
End: 2024-10-07
Payer: COMMERCIAL

## 2024-10-07 DIAGNOSIS — Z23 INFLUENZA VACCINE ADMINISTERED: Primary | ICD-10-CM

## 2024-10-07 PROCEDURE — 90471 IMMUNIZATION ADMIN: CPT | Performed by: FAMILY MEDICINE

## 2024-10-07 PROCEDURE — 90656 IIV3 VACC NO PRSV 0.5 ML IM: CPT | Performed by: FAMILY MEDICINE

## 2024-10-25 DIAGNOSIS — G25.81 RLS (RESTLESS LEGS SYNDROME): ICD-10-CM

## 2024-10-25 RX ORDER — ROPINIROLE 0.25 MG/1
0.25 TABLET, FILM COATED ORAL 3 TIMES DAILY
Qty: 90 TABLET | Refills: 0 | Status: SHIPPED | OUTPATIENT
Start: 2024-10-25

## 2024-11-20 DIAGNOSIS — E55.9 VITAMIN D DEFICIENCY: ICD-10-CM

## 2024-11-20 RX ORDER — ERGOCALCIFEROL 1.25 MG/1
CAPSULE, LIQUID FILLED ORAL
Qty: 5 CAPSULE | Refills: 3 | Status: SHIPPED | OUTPATIENT
Start: 2024-11-20

## 2024-11-25 DIAGNOSIS — K21.9 GASTROESOPHAGEAL REFLUX DISEASE WITHOUT ESOPHAGITIS: ICD-10-CM

## 2024-11-25 RX ORDER — PANTOPRAZOLE SODIUM 40 MG/1
40 TABLET, DELAYED RELEASE ORAL DAILY
Qty: 30 TABLET | Refills: 11 | Status: SHIPPED | OUTPATIENT
Start: 2024-11-25

## 2024-12-04 DIAGNOSIS — J43.2 CENTRILOBULAR EMPHYSEMA: ICD-10-CM

## 2024-12-05 RX ORDER — ALBUTEROL SULFATE 90 UG/1
2 INHALANT RESPIRATORY (INHALATION) EVERY 4 HOURS PRN
Qty: 18 G | Refills: 3 | OUTPATIENT
Start: 2024-12-05

## 2024-12-26 RX ORDER — ASPIRIN 81 MG/1
81 TABLET, CHEWABLE ORAL DAILY
Qty: 30 TABLET | Refills: 2 | Status: SHIPPED | OUTPATIENT
Start: 2024-12-26

## 2025-01-17 ENCOUNTER — OFFICE VISIT (OUTPATIENT)
Dept: PULMONOLOGY | Facility: CLINIC | Age: 65
End: 2025-01-17
Payer: COMMERCIAL

## 2025-01-17 VITALS
SYSTOLIC BLOOD PRESSURE: 122 MMHG | BODY MASS INDEX: 23.57 KG/M2 | OXYGEN SATURATION: 97 % | WEIGHT: 174 LBS | DIASTOLIC BLOOD PRESSURE: 60 MMHG | HEIGHT: 72 IN | HEART RATE: 71 BPM

## 2025-01-17 DIAGNOSIS — Z87.891 PERSONAL HISTORY OF NICOTINE DEPENDENCE: ICD-10-CM

## 2025-01-17 DIAGNOSIS — Z12.2 ENCOUNTER FOR SCREENING FOR LUNG CANCER: ICD-10-CM

## 2025-01-17 DIAGNOSIS — J43.2 CENTRILOBULAR EMPHYSEMA: ICD-10-CM

## 2025-01-17 DIAGNOSIS — J44.89 ASTHMA WITH COPD: Primary | ICD-10-CM

## 2025-01-17 PROCEDURE — 3074F SYST BP LT 130 MM HG: CPT | Performed by: NURSE PRACTITIONER

## 2025-01-17 PROCEDURE — 3078F DIAST BP <80 MM HG: CPT | Performed by: NURSE PRACTITIONER

## 2025-01-17 PROCEDURE — 99214 OFFICE O/P EST MOD 30 MIN: CPT | Performed by: NURSE PRACTITIONER

## 2025-01-17 RX ORDER — GABAPENTIN 800 MG/1
800 TABLET ORAL 3 TIMES DAILY
COMMUNITY
Start: 2024-09-21

## 2025-01-17 RX ORDER — IPRATROPIUM BROMIDE AND ALBUTEROL SULFATE 2.5; .5 MG/3ML; MG/3ML
3 SOLUTION RESPIRATORY (INHALATION) EVERY 4 HOURS PRN
Qty: 360 ML | Refills: 2 | Status: SHIPPED | OUTPATIENT
Start: 2025-01-17

## 2025-01-17 RX ORDER — OXYCODONE AND ACETAMINOPHEN 7.5; 325 MG/1; MG/1
1 TABLET ORAL
COMMUNITY
Start: 2024-09-16

## 2025-01-17 NOTE — PROGRESS NOTES
Follow Up Office Visit      Patient Name: Gaston Bagley    Chief Complaint:    Chief Complaint   Patient presents with    Breathing Problem       History of Present Illness: Gaston Bagley is a 64 y.o. male who is here today for follow up of asthma with COPD/emphysema. Since last visit, he notes that his dyspnea has been overall stable. He denies having any exacerbations. He is still smoking, cut back to ~1/2 ppd from 2 ppd previously.    Associated Symptoms: Dyspnea, no wheezing, occasional cough productive of clear mucus, no fevers, no hemoptysis, no unintended weight loss  Modifying Factors: Worse with exertion and cold weather.  Supplemental Oxygen: 2L qhs    Subjective      Review of Systems:  Review of Systems   Constitutional:  Negative for fever and unexpected weight change.   Respiratory:  Positive for cough and shortness of breath. Negative for wheezing.    Cardiovascular:  Negative for chest pain and leg swelling.        Past Medical History:   Past Medical History:   Diagnosis Date    Arthritis     COPD (chronic obstructive pulmonary disease)     Hyperlipidemia     Hypertension     Right cornea abrasion     WPW (Kalyani-Parkinson-White syndrome)        Past Surgical History:   Past Surgical History:   Procedure Laterality Date    BACK SURGERY  2018    L4-L5 lumbar fusion Dr. Carlos Enrique Moore    CARDIAC CATHETERIZATION  01/28/2020    FACIAL RECONSTRUCTION SURGERY      SPINAL CORD STIMULATOR IMPLANT N/A 5/20/2021    Procedure: SPINAL CORD STIMULATOR INSERTION PHASE 2;  Surgeon: Edson Figueroa DO;  Location: [facility] OR;  Service: Pain Management;  Laterality: N/A;    SPINAL CORD STIMULATOR REMOVAL N/A 7/26/2022    Procedure: SPINAL CORD STIMULATOR REMOVAL;  Surgeon: Edson Figueroa DO;  Location: [facility] OR;  Service: Pain Management;  Laterality: N/A;       Family History:   Family History   Problem Relation Age of Onset    Heart attack Mother 62    Stroke Mother 75    Arthritis Father     Arthritis  Brother     Heart attack Brother 58    Hypertension Brother     Arthritis Brother     Arthritis Brother     Hypertension Brother     Throat cancer Brother     Arthritis Brother     Hypertension Brother     Arthritis Brother     Stroke Brother 45       Social History:   Social History     Socioeconomic History    Marital status:    Tobacco Use    Smoking status: Every Day     Current packs/day: 0.50     Average packs/day: 0.5 packs/day for 48.0 years (24.0 ttl pk-yrs)     Types: Cigarettes     Start date: 1977     Passive exposure: Current    Smokeless tobacco: Never    Tobacco comments:     still smokes about half pack a day    Vaping Use    Vaping status: Never Used   Substance and Sexual Activity    Alcohol use: Not Currently    Drug use: Never    Sexual activity: Not Currently     Partners: Female       Current Medications:     Current Outpatient Medications:     acetaminophen (TYLENOL) 500 MG tablet, Take 1 tablet by mouth Every 4 (Four) Hours As Needed., Disp: , Rfl:     albuterol sulfate HFA (ProAir HFA) 108 (90 Base) MCG/ACT inhaler, Inhale 2 puffs Every 4 (Four) Hours As Needed for Wheezing or Shortness of Air., Disp: 18 g, Rfl: 3    aspirin 81 MG chewable tablet, CHEW 1 TABLET BY MOUTH DAILY., Disp: 30 tablet, Rfl: 2    atorvastatin (LIPITOR) 40 MG tablet, Take 1 tablet by mouth Daily., Disp: 30 tablet, Rfl: 11    carvedilol (COREG) 12.5 MG tablet, Take 1 tablet by mouth 2 (Two) Times a Day With Meals., Disp: 180 tablet, Rfl: 2    Diclofenac Sodium (VOLTAREN) 1 % gel gel, Apply 4 g topically to the appropriate area as directed 4 (Four) Times a Day As Needed (knee pain)., Disp: 100 g, Rfl: 5    Fluticasone-Umeclidin-Vilant (TRELEGY ELLIPTA) 200-62.5-25 MCG/ACT inhaler, Inhale 1 puff Daily., Disp: 60 each, Rfl: 5    gabapentin (NEURONTIN) 600 MG tablet, Take 1 tablet by mouth 2 (Two) Times a Day., Disp: , Rfl:     gabapentin (NEURONTIN) 800 MG tablet, Take 1 tablet by mouth 3 (Three) Times a Day.,  "Disp: , Rfl:     ipratropium-albuterol (DUO-NEB) 0.5-2.5 mg/3 ml nebulizer, Take 3 mL by nebulization Every 4 (Four) Hours As Needed for Wheezing., Disp: 360 mL, Rfl: 0    methocarbamol (ROBAXIN) 500 MG tablet, Take 1 tablet by mouth 4 (Four) Times a Day., Disp: 120 tablet, Rfl: 5    O2 (OXYGEN), Inhale 2 L/min 1 (One) Time. At night, Disp: , Rfl:     oxyCODONE-acetaminophen (PERCOCET) 7.5-325 MG per tablet, Take 1 tablet by mouth., Disp: , Rfl:     pantoprazole (PROTONIX) 40 MG EC tablet, TAKE 1 TABLET BY MOUTH DAILY., Disp: 30 tablet, Rfl: 11    rOPINIRole (REQUIP) 0.25 MG tablet, TAKE 1 TABLET BY MOUTH 3 (THREE) TIMES A DAY. TAKE 1 HOUR BEFORE BEDTIME., Disp: 90 tablet, Rfl: 0    valsartan (Diovan) 40 MG tablet, Take 1 tablet by mouth Daily., Disp: 30 tablet, Rfl: 5    vitamin B-12 (CYANOCOBALAMIN) 1000 MCG tablet, Take 1 tablet by mouth Daily., Disp: 30 tablet, Rfl: 2    vitamin D (ERGOCALCIFEROL) 1.25 MG (15040 UT) capsule capsule, TAKE 1 CAPSULE BY MOUTH ONCE EVERY WEEK, Disp: 5 capsule, Rfl: 3     Allergies:   No Known Allergies    Objective     Physical Exam:  Vital Signs:   Vitals:    01/17/25 1441   BP: 122/60   Pulse: 71   SpO2: 97%   Weight: 78.9 kg (174 lb)   Height: 182.9 cm (72\")     Body mass index is 23.6 kg/m².    Physical Exam  Vitals reviewed.   Constitutional:       General: He is not in acute distress.     Appearance: He is not toxic-appearing.   HENT:      Head: Normocephalic and atraumatic.      Mouth/Throat:      Mouth: Mucous membranes are moist.   Eyes:      Extraocular Movements: Extraocular movements intact.      Conjunctiva/sclera: Conjunctivae normal.   Pulmonary:      Effort: Pulmonary effort is normal.      Breath sounds: Normal breath sounds.   Abdominal:      General: There is no distension.      Palpations: Abdomen is soft.   Neurological:      Mental Status: He is alert.       Assessment / Plan      Assessment/Plan:   Diagnoses and all orders for this visit:    1. Asthma with " COPD (Primary)  -     Fluticasone-Umeclidin-Vilant (TRELEGY ELLIPTA) 200-62.5-25 MCG/ACT inhaler; Inhale 1 puff Daily. Rinse mouth out after use  Dispense: 60 each; Refill: 5  -     Complete PFT - Pre & Post Bronchodilator; Future  Continue current inhaled regimen. We discussed the risk and benefits of inhaled corticosteroids. Patient instructed to take them on a regular basis as prescribed. Patient instructed to rinse their mouth out after each use.     2. Centrilobular emphysema  -     ipratropium-albuterol (DUO-NEB) 0.5-2.5 mg/3 ml nebulizer; Take 3 mL by nebulization Every 4 (Four) Hours As Needed for Wheezing or Shortness of Air.  Dispense: 360 mL; Refill: 2  -     Complete PFT - Pre & Post Bronchodilator; Future  Schedule full PFTs prior to next clinic visit.    3. Personal history of nicotine dependence  -      CT Chest Low Dose Cancer Screening WO; Future  Complete cessation is advised. Patients symptoms are not expected to be as controlled as possible while still smoking and progression of lung disease will occur more rapidly with ongoing cigarette use. The patient was educated on the risk of fire/explosion and associated harm to self or others if open flame, including that from cigarettes if near a supplemental oxygen source. Patient voiced understanding.     4. Encounter for screening for lung cancer  -      CT Chest Low Dose Cancer Screening WO; Future  Patient with greater than 30-pack-year smoking history, and therefore low-dose lung cancer screening is recommended.  Shared decision making counseling including risks and benefits of low-dose lung cancer screening, follow-up diagnostic testing that may be indicated and how comorbid conditions would affect diagnosis/treatment, false positive rates, and total radiation exposure.  Patient wishes to undergo screening, seen as they would be willing to seek diagnosis and treatment if necessitated by results.       Follow Up:   Return in about 6 months (around  7/17/2025) for Recheck.  The patient was counseled on diagnostic results, risks and benefits of treatment options, risk factor modifications and the importance of treatment compliance. The patient was advised to contact the clinic with concerns or worsening symptoms.     NA Pryor   Pulmonary Medicine [place]     This document has been electronically signed by NA Pryor  January 17, 2025

## 2025-01-27 ENCOUNTER — OFFICE VISIT (OUTPATIENT)
Dept: FAMILY MEDICINE CLINIC | Facility: CLINIC | Age: 65
End: 2025-01-27
Payer: COMMERCIAL

## 2025-01-27 VITALS
SYSTOLIC BLOOD PRESSURE: 120 MMHG | HEART RATE: 63 BPM | DIASTOLIC BLOOD PRESSURE: 84 MMHG | BODY MASS INDEX: 23.43 KG/M2 | OXYGEN SATURATION: 97 % | HEIGHT: 72 IN | WEIGHT: 173 LBS

## 2025-01-27 DIAGNOSIS — E78.2 MIXED HYPERLIPIDEMIA: ICD-10-CM

## 2025-01-27 DIAGNOSIS — Z99.81 DEPENDENCE ON NOCTURNAL OXYGEN THERAPY: ICD-10-CM

## 2025-01-27 DIAGNOSIS — R21 RASH OF FOOT: ICD-10-CM

## 2025-01-27 DIAGNOSIS — F17.200 TOBACCO USE DISORDER: ICD-10-CM

## 2025-01-27 DIAGNOSIS — E55.9 VITAMIN D DEFICIENCY: ICD-10-CM

## 2025-01-27 DIAGNOSIS — E53.8 VITAMIN B 12 DEFICIENCY: ICD-10-CM

## 2025-01-27 DIAGNOSIS — Z00.00 WELL ADULT EXAM: Primary | ICD-10-CM

## 2025-01-27 DIAGNOSIS — J44.9 CHRONIC OBSTRUCTIVE PULMONARY DISEASE, UNSPECIFIED COPD TYPE: ICD-10-CM

## 2025-01-27 DIAGNOSIS — I10 ESSENTIAL HYPERTENSION: ICD-10-CM

## 2025-01-27 DIAGNOSIS — H53.9 VISION CHANGES: ICD-10-CM

## 2025-01-27 PROCEDURE — 99396 PREV VISIT EST AGE 40-64: CPT | Performed by: FAMILY MEDICINE

## 2025-01-27 PROCEDURE — 3074F SYST BP LT 130 MM HG: CPT | Performed by: FAMILY MEDICINE

## 2025-01-27 PROCEDURE — 1159F MED LIST DOCD IN RCRD: CPT | Performed by: FAMILY MEDICINE

## 2025-01-27 PROCEDURE — 3079F DIAST BP 80-89 MM HG: CPT | Performed by: FAMILY MEDICINE

## 2025-01-27 PROCEDURE — 1160F RVW MEDS BY RX/DR IN RCRD: CPT | Performed by: FAMILY MEDICINE

## 2025-01-27 PROCEDURE — 1125F AMNT PAIN NOTED PAIN PRSNT: CPT | Performed by: FAMILY MEDICINE

## 2025-01-27 RX ORDER — ATORVASTATIN CALCIUM 40 MG/1
40 TABLET, FILM COATED ORAL DAILY
Qty: 30 TABLET | Refills: 11 | Status: SHIPPED | OUTPATIENT
Start: 2025-01-27

## 2025-01-27 RX ORDER — AZITHROMYCIN 250 MG/1
TABLET, FILM COATED ORAL
Qty: 6 TABLET | Refills: 0 | Status: SHIPPED | OUTPATIENT
Start: 2025-01-27

## 2025-01-27 RX ORDER — PREDNISONE 10 MG/1
TABLET ORAL
Qty: 15 TABLET | Refills: 0 | Status: SHIPPED | OUTPATIENT
Start: 2025-01-27

## 2025-01-27 NOTE — PROGRESS NOTES
Subjective   Gaston Bagley is a 64 y.o. male.     History of Present Illness  Here for annual preventive health exam.    Social Hx  Household members: self  Marital status:   Tobacco use: smoking 1/2 ppd. Approx 25 pack year hx  EtOH use: no  Illicit drug use: no    Lifestyle  Diet: varied but higher in processed foods  Exercise: no formal  Using seatbelt: yes  Mental Health: denies depression  Feels safe in home: yes    Screenings  Colonoscopy: 2020, f/u in 10 yrs  DEXA: n/a  FLP: <3 yrs  BG/A1c: <3 yrs  Vitamin D: unsure, prevously deficient  Last dental check up: has dentures  Last vision exam: at least a year or two  Immunizations UTD: no  Needs: Shingrix    Sexual History  Sexual Activity: not currently   STD hx: denies. No concerns regarding STD exposure, declines screening    Chronic conditions include:  HTN - coreg, diovan  HLP - lipitor  Followed by cardiology for WPW, SVT, moderate TR.  COPD - albuterol prn, fluticasone/umeclidin/vilant, duoneb prn. On nocturnal oxygen  GERD - on protonix. No dysphagia, abd pain, weight loss or blood in stool  Vit D and B12 def's in past  Followed by pain mgnt for failed back syndrome      The following portions of the patient's history were reviewed and updated as appropriate: allergies, current medications, past family history, past medical history, past social history, past surgical history, and problem list.    Review of Systems   Constitutional:  Positive for fatigue. Negative for fever and unexpected weight change.   HENT:  Negative for congestion, ear pain, hearing loss, nosebleeds, rhinorrhea, sneezing, sore throat, tinnitus and trouble swallowing.    Eyes:  Negative for pain, discharge, redness and visual disturbance.   Respiratory:  Positive for cough, shortness of breath and wheezing. Negative for chest tightness.    Cardiovascular:  Positive for palpitations. Negative for chest pain and leg swelling.   Gastrointestinal:  Negative for abdominal pain,  blood in stool, constipation, diarrhea, nausea and vomiting.   Endocrine: Negative for cold intolerance, heat intolerance, polydipsia and polyuria.        Night sweats   Genitourinary:  Negative for dysuria, flank pain, frequency, hematuria and urgency.   Musculoskeletal:  Positive for arthralgias, back pain, gait problem (due to back pain) and myalgias. Negative for joint swelling and neck pain.   Skin:  Positive for rash (chronic, on feet). Negative for wound.   Neurological:  Negative for dizziness, tremors, seizures, syncope, speech difficulty, weakness, numbness and headaches.   Hematological:  Negative for adenopathy. Bruises/bleeds easily.   Psychiatric/Behavioral:  Positive for dysphoric mood and sleep disturbance. Negative for confusion and suicidal ideas. The patient is nervous/anxious.    Pt's previous ROS reviewed and updated as indicated.       Objective   Vitals:    01/27/25 1541   BP: 120/84   Pulse: 63   SpO2: 97%     Body mass index is 23.46 kg/m².      01/27/25  1541   Weight: 78.5 kg (173 lb)       Physical Exam  Vitals and nursing note reviewed.   Constitutional:       General: He is not in acute distress.     Appearance: He is well-groomed and normal weight. He is not ill-appearing.      Comments: Appears older than stated age   HENT:      Head: Atraumatic.      Right Ear: Tympanic membrane and ear canal normal.      Left Ear: Tympanic membrane and ear canal normal.      Nose: Nose normal.      Mouth/Throat:      Mouth: Mucous membranes are moist. No oral lesions.      Dentition: Has dentures.      Pharynx: Posterior oropharyngeal erythema (diffuse, mild) present.   Eyes:      General: No scleral icterus.     Conjunctiva/sclera: Conjunctivae normal.   Neck:      Thyroid: No thyroid mass.      Vascular: Normal carotid pulses. No carotid bruit.   Cardiovascular:      Rate and Rhythm: Normal rate and regular rhythm.      Pulses: Decreased pulses (pedal).      Heart sounds: Heart sounds are distant.    Pulmonary:      Effort: Pulmonary effort is normal.      Breath sounds: Decreased breath sounds present. No wheezing, rhonchi or rales.   Abdominal:      General: Bowel sounds are normal. There is no distension.      Palpations: There is no mass.      Tenderness: There is no abdominal tenderness.   Musculoskeletal:      Right lower leg: No edema.      Left lower leg: No edema.   Lymphadenopathy:      Cervical: No cervical adenopathy.      Upper Body:      Right upper body: No supraclavicular adenopathy.      Left upper body: No supraclavicular adenopathy.   Skin:     General: Skin is warm and dry.      Coloration: Skin is not cyanotic, jaundiced or pale.      Nails: There is clubbing.   Neurological:      Mental Status: He is alert and oriented to person, place, and time.      Gait: Gait is intact.   Psychiatric:         Mood and Affect: Mood and affect normal.         Behavior: Behavior normal. Behavior is cooperative.         Cognition and Memory: Cognition normal.     Pt's previous physical exam reviewed and updated as indicated.      Assessment & Plan   Diagnoses and all orders for this visit:    1. Well adult exam (Primary)  -     CBC & Differential  -     Comprehensive Metabolic Panel  -     Lipid Panel  -     TSH Rfx On Abnormal To Free T4  -     Hemoglobin A1c    2. Vitamin D deficiency  -     Vitamin D,25-Hydroxy    3. Vitamin B 12 deficiency  -     Vitamin B12    4. Rash of foot  -     Ambulatory Referral to Dermatology    5. Vision changes  -     Ambulatory Referral to Optometry    6. Mixed hyperlipidemia    7. Essential hypertension    8. Chronic obstructive pulmonary disease, unspecified COPD type    9. Dependence on nocturnal oxygen therapy    10. Tobacco use disorder    Other orders  -     azithromycin (Zithromax Z-Alban) 250 MG tablet; Take 2 tablets the first day, then 1 tablet daily for 4 days.  Dispense: 6 tablet; Refill: 0  -     predniSONE (DELTASONE) 10 MG tablet; 5 po day 1, then decrease by 1  tablet each day until gone (5,4,3,2,1)  Dispense: 15 tablet; Refill: 0       Age appropriate preventive care reviewed including cancer screenings, safety measures, mental health concerns, supplements, prevention of CV disease and DM, etc. Handout provided.    Assess status of vit/min deficiencies and replace as indicated.    Given zpak and prednisone to have on hand for symptoms of COPD exacerbation prn. Continue trelegy, nebs prn    Tobacco cessation advised.  Pt voiced understanding of health risks of cont'd tobacco use.    HTN - continue coreg, diovan  HLP - continue statin    Fu/ in 6 months, sooner as needed/instructed.  I will contact patient regarding test results and provide instructions regarding any necessary changes in plan of care.  Patient was encouraged to keep me informed of any acute changes, lack of improvement, or any new concerning symptoms.  Pt is aware of reasons to seek emergent care.  Patient voiced understanding of all instructions and denied further questions.    Please note that portions of this note may have been completed with a voice recognition program.

## 2025-01-28 LAB
25(OH)D3+25(OH)D2 SERPL-MCNC: 57 NG/ML (ref 30–100)
ALBUMIN SERPL-MCNC: 4.3 G/DL (ref 3.9–4.9)
ALP SERPL-CCNC: 157 IU/L (ref 44–121)
ALT SERPL-CCNC: 11 IU/L (ref 0–44)
AST SERPL-CCNC: 14 IU/L (ref 0–40)
BASOPHILS # BLD AUTO: 0.1 X10E3/UL (ref 0–0.2)
BASOPHILS NFR BLD AUTO: 1 %
BILIRUB SERPL-MCNC: 0.9 MG/DL (ref 0–1.2)
BUN SERPL-MCNC: 12 MG/DL (ref 8–27)
BUN/CREAT SERPL: 13 (ref 10–24)
CALCIUM SERPL-MCNC: 8.9 MG/DL (ref 8.6–10.2)
CHLORIDE SERPL-SCNC: 100 MMOL/L (ref 96–106)
CHOLEST SERPL-MCNC: 105 MG/DL (ref 100–199)
CO2 SERPL-SCNC: 23 MMOL/L (ref 20–29)
CREAT SERPL-MCNC: 0.93 MG/DL (ref 0.76–1.27)
EGFRCR SERPLBLD CKD-EPI 2021: 93 ML/MIN/1.73
EOSINOPHIL # BLD AUTO: 0.2 X10E3/UL (ref 0–0.4)
EOSINOPHIL NFR BLD AUTO: 2 %
ERYTHROCYTE [DISTWIDTH] IN BLOOD BY AUTOMATED COUNT: 12.5 % (ref 11.6–15.4)
GLOBULIN SER CALC-MCNC: 2.2 G/DL (ref 1.5–4.5)
GLUCOSE SERPL-MCNC: 94 MG/DL (ref 70–99)
HBA1C MFR BLD: 6 % (ref 4.8–5.6)
HCT VFR BLD AUTO: 50 % (ref 37.5–51)
HDLC SERPL-MCNC: 32 MG/DL
HGB BLD-MCNC: 16.7 G/DL (ref 13–17.7)
IMM GRANULOCYTES # BLD AUTO: 0 X10E3/UL (ref 0–0.1)
IMM GRANULOCYTES NFR BLD AUTO: 0 %
LDLC SERPL CALC-MCNC: 53 MG/DL (ref 0–99)
LYMPHOCYTES # BLD AUTO: 2.2 X10E3/UL (ref 0.7–3.1)
LYMPHOCYTES NFR BLD AUTO: 33 %
MCH RBC QN AUTO: 29.4 PG (ref 26.6–33)
MCHC RBC AUTO-ENTMCNC: 33.4 G/DL (ref 31.5–35.7)
MCV RBC AUTO: 88 FL (ref 79–97)
MONOCYTES # BLD AUTO: 0.5 X10E3/UL (ref 0.1–0.9)
MONOCYTES NFR BLD AUTO: 7 %
NEUTROPHILS # BLD AUTO: 3.8 X10E3/UL (ref 1.4–7)
NEUTROPHILS NFR BLD AUTO: 57 %
PLATELET # BLD AUTO: 230 X10E3/UL (ref 150–450)
POTASSIUM SERPL-SCNC: 4.2 MMOL/L (ref 3.5–5.2)
PROT SERPL-MCNC: 6.5 G/DL (ref 6–8.5)
RBC # BLD AUTO: 5.68 X10E6/UL (ref 4.14–5.8)
SODIUM SERPL-SCNC: 138 MMOL/L (ref 134–144)
TRIGL SERPL-MCNC: 103 MG/DL (ref 0–149)
TSH SERPL DL<=0.005 MIU/L-ACNC: 0.58 UIU/ML (ref 0.45–4.5)
VIT B12 SERPL-MCNC: 1089 PG/ML (ref 232–1245)
VLDLC SERPL CALC-MCNC: 20 MG/DL (ref 5–40)
WBC # BLD AUTO: 6.7 X10E3/UL (ref 3.4–10.8)

## 2025-01-30 PROBLEM — Z79.1 ENCOUNTER FOR MONITORING CHRONIC NSAID THERAPY: Status: RESOLVED | Noted: 2022-05-04 | Resolved: 2025-01-30

## 2025-01-30 PROBLEM — Z51.81 ENCOUNTER FOR MONITORING CHRONIC NSAID THERAPY: Status: RESOLVED | Noted: 2022-05-04 | Resolved: 2025-01-30

## 2025-02-07 DIAGNOSIS — G25.81 RLS (RESTLESS LEGS SYNDROME): ICD-10-CM

## 2025-02-07 RX ORDER — LANOLIN ALCOHOL/MO/W.PET/CERES
1000 CREAM (GRAM) TOPICAL DAILY
Qty: 30 TABLET | Refills: 2 | Status: SHIPPED | OUTPATIENT
Start: 2025-02-07

## 2025-02-07 RX ORDER — ROPINIROLE 0.25 MG/1
0.25 TABLET, FILM COATED ORAL 3 TIMES DAILY
Qty: 90 TABLET | Refills: 0 | Status: SHIPPED | OUTPATIENT
Start: 2025-02-07

## 2025-02-13 ENCOUNTER — HOSPITAL ENCOUNTER (OUTPATIENT)
Dept: CT IMAGING | Facility: HOSPITAL | Age: 65
Discharge: HOME | End: 2025-02-13
Admitting: NURSE PRACTITIONER
Payer: COMMERCIAL

## 2025-02-13 DIAGNOSIS — Z87.891 PERSONAL HISTORY OF NICOTINE DEPENDENCE: ICD-10-CM

## 2025-02-13 DIAGNOSIS — Z12.2 ENCOUNTER FOR SCREENING FOR LUNG CANCER: ICD-10-CM

## 2025-02-13 PROCEDURE — 71271 CT THORAX LUNG CANCER SCR C-: CPT

## 2025-02-25 DIAGNOSIS — I10 ESSENTIAL HYPERTENSION: ICD-10-CM

## 2025-02-25 RX ORDER — VALSARTAN 40 MG/1
40 TABLET ORAL DAILY
Qty: 30 TABLET | Refills: 4 | Status: SHIPPED | OUTPATIENT
Start: 2025-02-25

## 2025-03-24 RX ORDER — ASPIRIN 81 MG/1
81 TABLET, CHEWABLE ORAL DAILY
Qty: 30 TABLET | Refills: 2 | Status: SHIPPED | OUTPATIENT
Start: 2025-03-24

## 2025-03-24 RX ORDER — LANOLIN ALCOHOL/MO/W.PET/CERES
1000 CREAM (GRAM) TOPICAL DAILY
Qty: 30 TABLET | Refills: 2 | Status: SHIPPED | OUTPATIENT
Start: 2025-03-24

## 2025-04-07 RX ORDER — METHOCARBAMOL 500 MG/1
500 TABLET, FILM COATED ORAL EVERY 6 HOURS
Qty: 120 TABLET | Refills: 4 | Status: SHIPPED | OUTPATIENT
Start: 2025-04-07

## 2025-04-11 DIAGNOSIS — E55.9 VITAMIN D DEFICIENCY: ICD-10-CM

## 2025-04-11 RX ORDER — ERGOCALCIFEROL 1.25 MG/1
CAPSULE, LIQUID FILLED ORAL
Qty: 5 CAPSULE | Refills: 2 | Status: SHIPPED | OUTPATIENT
Start: 2025-04-11

## 2025-04-21 DIAGNOSIS — G25.81 RLS (RESTLESS LEGS SYNDROME): ICD-10-CM

## 2025-04-22 RX ORDER — ROPINIROLE 0.25 MG/1
0.25 TABLET, FILM COATED ORAL 3 TIMES DAILY
Qty: 90 TABLET | Refills: 0 | Status: SHIPPED | OUTPATIENT
Start: 2025-04-22

## 2025-05-27 RX ORDER — ASPIRIN 81 MG/1
81 TABLET, CHEWABLE ORAL DAILY
Qty: 30 TABLET | Refills: 2 | Status: SHIPPED | OUTPATIENT
Start: 2025-05-27

## 2025-05-27 RX ORDER — LANOLIN ALCOHOL/MO/W.PET/CERES
1000 CREAM (GRAM) TOPICAL DAILY
Qty: 30 TABLET | Refills: 2 | Status: SHIPPED | OUTPATIENT
Start: 2025-05-27

## 2025-05-27 RX ORDER — CARVEDILOL 12.5 MG/1
12.5 TABLET ORAL 2 TIMES DAILY WITH MEALS
Qty: 180 TABLET | Refills: 2 | Status: SHIPPED | OUTPATIENT
Start: 2025-05-27

## 2025-06-18 DIAGNOSIS — G25.81 RLS (RESTLESS LEGS SYNDROME): ICD-10-CM

## 2025-06-18 DIAGNOSIS — E55.9 VITAMIN D DEFICIENCY: ICD-10-CM

## 2025-06-18 DIAGNOSIS — J43.2 CENTRILOBULAR EMPHYSEMA: ICD-10-CM

## 2025-06-18 DIAGNOSIS — J44.89 ASTHMA WITH COPD: ICD-10-CM

## 2025-06-19 RX ORDER — ROPINIROLE 0.25 MG/1
0.25 TABLET, FILM COATED ORAL 3 TIMES DAILY
Qty: 90 TABLET | Refills: 0 | Status: SHIPPED | OUTPATIENT
Start: 2025-06-19

## 2025-06-19 RX ORDER — ERGOCALCIFEROL 1.25 MG/1
50000 CAPSULE, LIQUID FILLED ORAL
Qty: 4 CAPSULE | Refills: 0 | Status: SHIPPED | OUTPATIENT
Start: 2025-06-19

## 2025-06-19 RX ORDER — ALBUTEROL SULFATE 90 UG/1
2 INHALANT RESPIRATORY (INHALATION) EVERY 4 HOURS PRN
Qty: 18 G | Refills: 0 | Status: SHIPPED | OUTPATIENT
Start: 2025-06-19

## 2025-06-19 NOTE — TELEPHONE ENCOUNTER
Rx Refill Note  Requested Prescriptions     Pending Prescriptions Disp Refills    vitamin D (ERGOCALCIFEROL) 1.25 MG (12687 UT) capsule capsule 5 capsule 2     Signed Prescriptions Disp Refills    rOPINIRole (REQUIP) 0.25 MG tablet 90 tablet 0     Sig: Take 1 tablet by mouth 3 (Three) Times a Day. Take 1 hour before bedtime.     Authorizing Provider: MELY YBARRA     Ordering User: CANDACE BLOUNT      Last office visit with prescribing clinician: 1/27/2025   Last telemedicine visit with prescribing clinician: Visit date not found   Next office visit with prescribing clinician: 7/28/2025                         Would you like a call back once the refill request has been completed: [] Yes [] No    If the office needs to give you a call back, can they leave a voicemail: [] Yes [] No    Candace Fisher CMA  06/19/25, 09:23 EDT

## 2025-06-30 RX ORDER — LANOLIN ALCOHOL/MO/W.PET/CERES
1000 CREAM (GRAM) TOPICAL DAILY
Qty: 30 TABLET | Refills: 2 | Status: SHIPPED | OUTPATIENT
Start: 2025-06-30

## 2025-06-30 RX ORDER — ASPIRIN 81 MG/1
81 TABLET, CHEWABLE ORAL DAILY
Qty: 30 TABLET | Refills: 2 | Status: SHIPPED | OUTPATIENT
Start: 2025-06-30

## 2025-07-10 DIAGNOSIS — K21.9 GASTROESOPHAGEAL REFLUX DISEASE WITHOUT ESOPHAGITIS: ICD-10-CM

## 2025-07-11 RX ORDER — CARVEDILOL 12.5 MG/1
12.5 TABLET ORAL 2 TIMES DAILY WITH MEALS
Qty: 180 TABLET | Refills: 2 | Status: SHIPPED | OUTPATIENT
Start: 2025-07-11

## 2025-07-11 RX ORDER — PANTOPRAZOLE SODIUM 40 MG/1
40 TABLET, DELAYED RELEASE ORAL DAILY
Qty: 30 TABLET | Refills: 11 | Status: SHIPPED | OUTPATIENT
Start: 2025-07-11

## 2025-07-18 ENCOUNTER — OFFICE VISIT (OUTPATIENT)
Dept: PULMONOLOGY | Facility: CLINIC | Age: 65
End: 2025-07-18
Payer: COMMERCIAL

## 2025-07-18 VITALS
BODY MASS INDEX: 23.43 KG/M2 | HEART RATE: 60 BPM | WEIGHT: 173 LBS | HEIGHT: 72 IN | OXYGEN SATURATION: 96 % | DIASTOLIC BLOOD PRESSURE: 70 MMHG | SYSTOLIC BLOOD PRESSURE: 142 MMHG

## 2025-07-18 DIAGNOSIS — J44.89 ASTHMA WITH COPD: Primary | ICD-10-CM

## 2025-07-18 DIAGNOSIS — F17.210 NICOTINE DEPENDENCE, CIGARETTES, UNCOMPLICATED: ICD-10-CM

## 2025-07-18 DIAGNOSIS — Z71.6 ENCOUNTER FOR SMOKING CESSATION COUNSELING: ICD-10-CM

## 2025-07-18 DIAGNOSIS — Z12.2 ENCOUNTER FOR SCREENING FOR LUNG CANCER: ICD-10-CM

## 2025-07-18 DIAGNOSIS — J43.2 CENTRILOBULAR EMPHYSEMA: ICD-10-CM

## 2025-07-18 DIAGNOSIS — R06.02 SOB (SHORTNESS OF BREATH): Primary | ICD-10-CM

## 2025-07-18 PROCEDURE — 99214 OFFICE O/P EST MOD 30 MIN: CPT | Performed by: NURSE PRACTITIONER

## 2025-07-18 PROCEDURE — 3078F DIAST BP <80 MM HG: CPT | Performed by: NURSE PRACTITIONER

## 2025-07-18 PROCEDURE — 3077F SYST BP >= 140 MM HG: CPT | Performed by: NURSE PRACTITIONER

## 2025-07-18 PROCEDURE — 99406 BEHAV CHNG SMOKING 3-10 MIN: CPT | Performed by: NURSE PRACTITIONER

## 2025-07-18 RX ORDER — VARENICLINE TARTRATE 0.5 (11)-1
1 KIT ORAL SEE ADMIN INSTRUCTIONS
Qty: 53 EACH | Refills: 0 | Status: SHIPPED | OUTPATIENT
Start: 2025-07-18

## 2025-07-18 RX ORDER — VARENICLINE TARTRATE 1 MG/1
1 TABLET, FILM COATED ORAL 2 TIMES DAILY
Qty: 60 TABLET | Refills: 4 | Status: SHIPPED | OUTPATIENT
Start: 2025-07-18

## 2025-07-18 NOTE — PROGRESS NOTES
Follow Up Office Visit      Patient Name: Gaston Bagley    Chief Complaint:    Chief Complaint   Patient presents with    Breathing Problem       History of Present Illness: Gaston Bagley is a 64 y.o. male who is here today for follow up of asthma with COPD/emphysema. Since last visit, he denies having any exacerbations. He notes that his dyspnea has been stable. He reports compliance with Trelegy. No increase in SHAR need. He has been trying to quit smoking and would like to use Chantix which he tolerated well in the past.    Associated Symptoms: Exertional dyspnea, chronic cough productive of clear/milky mucus, occasional wheezing  Supplemental Oxygen: 2L qhs    Subjective      Review of Systems:  Review of Systems   Constitutional:  Negative for fever and unexpected weight change.   Respiratory:  Positive for cough, shortness of breath and wheezing.    Cardiovascular:  Negative for chest pain and leg swelling.        Past Medical History:   Past Medical History:   Diagnosis Date    Arthritis     COPD (chronic obstructive pulmonary disease)     Hyperlipidemia     Hypertension     Right cornea abrasion     WPW (Kalyani-Parkinson-White syndrome)        Past Surgical History:   Past Surgical History:   Procedure Laterality Date    BACK SURGERY  2018    L4-L5 lumbar fusion Dr. Carlos Enrique Moore    CARDIAC CATHETERIZATION  01/28/2020    FACIAL RECONSTRUCTION SURGERY      SPINAL CORD STIMULATOR IMPLANT N/A 5/20/2021    Procedure: SPINAL CORD STIMULATOR INSERTION PHASE 2;  Surgeon: Edson Figueroa DO;  Location: [facility];  Service: Pain Management;  Laterality: N/A;    SPINAL CORD STIMULATOR REMOVAL N/A 7/26/2022    Procedure: SPINAL CORD STIMULATOR REMOVAL;  Surgeon: Edson Figueroa DO;  Location: [facility];  Service: Pain Management;  Laterality: N/A;       Family History:   Family History   Problem Relation Age of Onset    Heart attack Mother 62    Stroke Mother 75    Arthritis Father     Arthritis Brother      Heart attack Brother 58    Hypertension Brother     Arthritis Brother     Arthritis Brother     Hypertension Brother     Throat cancer Brother     Arthritis Brother     Hypertension Brother     Arthritis Brother     Stroke Brother 45       Social History:   Social History     Socioeconomic History    Marital status:    Tobacco Use    Smoking status: Every Day     Current packs/day: 0.50     Average packs/day: 2.0 packs/day for 46.6 years (90.8 ttl pk-yrs)     Types: Cigarettes     Start date: 1/1/2024     Passive exposure: Current    Smokeless tobacco: Never    Tobacco comments:     still smokes about half pack a day    Vaping Use    Vaping status: Never Used   Substance and Sexual Activity    Alcohol use: Not Currently    Drug use: Never    Sexual activity: Not Currently     Partners: Female       Current Medications:     Current Outpatient Medications:     acetaminophen (TYLENOL) 500 MG tablet, Take 1 tablet by mouth Every 4 (Four) Hours As Needed., Disp: , Rfl:     albuterol sulfate HFA (ProAir HFA) 108 (90 Base) MCG/ACT inhaler, Inhale 2 puffs Every 4 (Four) Hours As Needed for Wheezing or Shortness of Air., Disp: 18 g, Rfl: 0    aspirin 81 MG chewable tablet, Chew 1 tablet Daily., Disp: 30 tablet, Rfl: 2    atorvastatin (LIPITOR) 40 MG tablet, TAKE 1 TABLET BY MOUTH DAILY., Disp: 30 tablet, Rfl: 11    carvedilol (COREG) 12.5 MG tablet, Take 1 tablet by mouth 2 (Two) Times a Day With Meals., Disp: 180 tablet, Rfl: 2    Diclofenac Sodium (VOLTAREN) 1 % gel gel, Apply 4 g topically to the appropriate area as directed 4 (Four) Times a Day As Needed (knee pain)., Disp: 100 g, Rfl: 5    Fluticasone-Umeclidin-Vilant (TRELEGY ELLIPTA) 200-62.5-25 MCG/ACT inhaler, Inhale 1 puff Daily. Rinse mouth out after use, Disp: 60 each, Rfl: 5    gabapentin (NEURONTIN) 800 MG tablet, Take 1 tablet by mouth 3 (Three) Times a Day., Disp: , Rfl:     ipratropium-albuterol (DUO-NEB) 0.5-2.5 mg/3 ml nebulizer, Take 3 mL by  "nebulization Every 4 (Four) Hours As Needed for Wheezing or Shortness of Air., Disp: 360 mL, Rfl: 2    methocarbamol (ROBAXIN) 500 MG tablet, TAKE 1 TABLET BY MOUTH 4 (FOUR) TIMES A DAY., Disp: 120 tablet, Rfl: 4    O2 (OXYGEN), Inhale 2 L/min 1 (One) Time. At night, Disp: , Rfl:     oxyCODONE-acetaminophen (PERCOCET) 7.5-325 MG per tablet, Take 1 tablet by mouth., Disp: , Rfl:     pantoprazole (PROTONIX) 40 MG EC tablet, Take 1 tablet by mouth Daily., Disp: 30 tablet, Rfl: 11    rOPINIRole (REQUIP) 0.25 MG tablet, Take 1 tablet by mouth 3 (Three) Times a Day. Take 1 hour before bedtime., Disp: 90 tablet, Rfl: 0    valsartan (DIOVAN) 40 MG tablet, TAKE 1 TABLET BY MOUTH DAILY., Disp: 30 tablet, Rfl: 4    vitamin B-12 (CYANOCOBALAMIN) 1000 MCG tablet, Take 1 tablet by mouth Daily., Disp: 30 tablet, Rfl: 2    vitamin D (ERGOCALCIFEROL) 1.25 MG (43519 UT) capsule capsule, Take 1 capsule by mouth Every 7 (Seven) Days., Disp: 4 capsule, Rfl: 0     Allergies:   No Known Allergies    Objective     Physical Exam:  Vital Signs:   Vitals:    07/18/25 1313   BP: 142/70   Pulse: 60   SpO2: 96%   Weight: 78.5 kg (173 lb)   Height: 182.9 cm (72\")     Body mass index is 23.46 kg/m².    Physical Exam  Vitals reviewed.   Constitutional:       General: He is not in acute distress.     Appearance: He is not toxic-appearing.   HENT:      Head: Normocephalic and atraumatic.      Mouth/Throat:      Mouth: Mucous membranes are moist.   Eyes:      Extraocular Movements: Extraocular movements intact.      Conjunctiva/sclera: Conjunctivae normal.   Cardiovascular:      Rate and Rhythm: Normal rate.      Heart sounds: Normal heart sounds.   Pulmonary:      Effort: Pulmonary effort is normal.      Breath sounds: Normal breath sounds.   Abdominal:      General: There is no distension.      Palpations: Abdomen is soft.   Musculoskeletal:         General: No swelling.      Cervical back: Neck supple.   Skin:     General: Skin is warm and dry.     "  Findings: No rash.   Neurological:      General: No focal deficit present.      Mental Status: He is alert and oriented to person, place, and time.   Psychiatric:         Mood and Affect: Mood normal.         Behavior: Behavior normal.       Results Review:   February 2025 LDCT showed stable bilateral subcentimeter pulmonary nodules.     PFTs obtained today per my interpretation showed moderate to severe obstruction without significant BD response. Post BD FEV1 of 50%. No restriction, +airtrapping. Reduced DLCO/VA of 65%. Official interpretation pending.    Assessment / Plan      Assessment/Plan:   Diagnoses and all orders for this visit:    1. Asthma with COPD (Primary)  PFT results obtained today were reviewed and discussed with patient. Continue current inhaled regimen. We discussed the risk and benefits of inhaled corticosteroids. Patient instructed to take them on a regular basis as prescribed. Patient instructed to rinse their mouth out after each use. Patient was encouraged to maintain as much physical activity as he can safely tolerate.    2. Encounter for screening for lung cancer  -      CT Chest Low Dose Cancer Screening WO; Future  Patient with greater than 30-pack-year smoking history, and therefore low-dose lung cancer screening is recommended.  Shared decision making counseling including risks and benefits of low-dose lung cancer screening, follow-up diagnostic testing that may be indicated and how comorbid conditions would affect diagnosis/treatment, false positive rates, and total radiation exposure.  Patient wishes to undergo screening, seen as they would be willing to seek diagnosis and treatment if necessitated by results.    3. Nicotine dependence, cigarettes, uncomplicated  -      CT Chest Low Dose Cancer Screening WO; Future  -     Varenicline Tartrate, Starter, 0.5 MG X 11 & 1 MG X 42 tablet therapy pack; Take 1 tablet by mouth See Admin Instructions. Use as directed on package instructions,  try to quit smoking after 1 week  Dispense: 53 each; Refill: 0  -     varenicline (CHANTIX) 1 MG tablet; Take 1 tablet by mouth 2 (Two) Times a Day.  Dispense: 60 tablet; Refill: 4  Complete cessation is advised. Discussed possible side effects of medications. Risk and benefits of the medication were discussed with patient.     4. Encounter for smoking cessation counseling  Tobacco cessation counseling for 5 minutes. Patient is currently interested in quitting. The plan is to use Chantix with goal quit date in 4 weeks. Resources for support discussed. Plan to readdress at next visit.     5. Centrilobular emphysema  Noted on chest imaging. Inhaled management as discussed above.       Follow Up:   Return in about 8 months (around 3/18/2026) for Recheck.  The patient was counseled on diagnostic results, risks and benefits of treatment options, risk factor modifications and the importance of treatment compliance. The patient was advised to contact the clinic with concerns or worsening symptoms.     AN Pryor   Pulmonary Medicine [place]     This document has been electronically signed by NA Pryor  July 18, 2025

## 2025-07-28 ENCOUNTER — OFFICE VISIT (OUTPATIENT)
Dept: FAMILY MEDICINE CLINIC | Facility: CLINIC | Age: 65
End: 2025-07-28
Payer: COMMERCIAL

## 2025-07-28 VITALS
DIASTOLIC BLOOD PRESSURE: 84 MMHG | HEIGHT: 72 IN | HEART RATE: 76 BPM | BODY MASS INDEX: 23.51 KG/M2 | WEIGHT: 173.6 LBS | OXYGEN SATURATION: 96 % | SYSTOLIC BLOOD PRESSURE: 130 MMHG

## 2025-07-28 DIAGNOSIS — I10 ESSENTIAL HYPERTENSION: ICD-10-CM

## 2025-07-28 DIAGNOSIS — N52.9 ERECTILE DYSFUNCTION, UNSPECIFIED ERECTILE DYSFUNCTION TYPE: Primary | ICD-10-CM

## 2025-07-28 DIAGNOSIS — G25.81 RLS (RESTLESS LEGS SYNDROME): ICD-10-CM

## 2025-07-28 DIAGNOSIS — R73.03 PREDIABETES: ICD-10-CM

## 2025-07-28 DIAGNOSIS — J44.9 CHRONIC OBSTRUCTIVE PULMONARY DISEASE, UNSPECIFIED COPD TYPE: ICD-10-CM

## 2025-07-28 DIAGNOSIS — K21.9 GASTROESOPHAGEAL REFLUX DISEASE WITHOUT ESOPHAGITIS: ICD-10-CM

## 2025-07-28 DIAGNOSIS — D72.829 LEUKOCYTOSIS, UNSPECIFIED TYPE: ICD-10-CM

## 2025-07-28 DIAGNOSIS — F17.200 TOBACCO USE DISORDER: ICD-10-CM

## 2025-07-28 DIAGNOSIS — E78.2 MIXED HYPERLIPIDEMIA: ICD-10-CM

## 2025-07-28 PROCEDURE — 3079F DIAST BP 80-89 MM HG: CPT | Performed by: FAMILY MEDICINE

## 2025-07-28 PROCEDURE — 99214 OFFICE O/P EST MOD 30 MIN: CPT | Performed by: FAMILY MEDICINE

## 2025-07-28 PROCEDURE — 1160F RVW MEDS BY RX/DR IN RCRD: CPT | Performed by: FAMILY MEDICINE

## 2025-07-28 PROCEDURE — 3075F SYST BP GE 130 - 139MM HG: CPT | Performed by: FAMILY MEDICINE

## 2025-07-28 PROCEDURE — 1159F MED LIST DOCD IN RCRD: CPT | Performed by: FAMILY MEDICINE

## 2025-07-28 PROCEDURE — 1125F AMNT PAIN NOTED PAIN PRSNT: CPT | Performed by: FAMILY MEDICINE

## 2025-07-28 RX ORDER — ROPINIROLE 0.25 MG/1
0.25 TABLET, FILM COATED ORAL 3 TIMES DAILY
Qty: 90 TABLET | Refills: 0 | Status: CANCELLED | OUTPATIENT
Start: 2025-07-28

## 2025-07-28 RX ORDER — SILDENAFIL CITRATE 20 MG/1
TABLET ORAL
Qty: 30 TABLET | Refills: 1 | Status: SHIPPED | OUTPATIENT
Start: 2025-07-28

## 2025-07-28 NOTE — PROGRESS NOTES
Subjective   Gaston Bagley is a 64 y.o. male.     History of Present Illness  Here for routine f/u on several chronic med conditions including:  HTN - coreg, diovan  HLP - lipitor  Followed by cardiology for WPW, SVT, moderate TR.  COPD - albuterol prn, fluticasone/umeclidin/vilant, duoneb prn. On nocturnal oxygen. Has restarted chantix as he is working to quit smoking.  GERD - on protonix. No dysphagia, abd pain, weight loss or blood in stool  Vit D and B12 def's in past  Followed by pain mgnt for failed back syndrome  ED - new partner. Would like to try viagra. Denies h/o STD. Declines screening today.  RLS - requip working well    Pt's previous HPI reviewed and updated as indicated.     The following portions of the patient's history were reviewed and updated as appropriate: allergies, current medications, past family history, past medical history, past social history, past surgical history, and problem list.    Review of Systems   Constitutional:  Positive for fatigue. Negative for fever and unexpected weight change.   HENT:  Negative for congestion, ear pain, hearing loss, nosebleeds, rhinorrhea, sneezing, sore throat, tinnitus and trouble swallowing.    Eyes:  Negative for pain, discharge, redness and visual disturbance.   Respiratory:  Positive for cough, shortness of breath and wheezing. Negative for chest tightness.    Cardiovascular:  Positive for palpitations. Negative for chest pain and leg swelling.   Gastrointestinal:  Negative for abdominal pain, blood in stool, constipation, diarrhea, nausea and vomiting.   Endocrine: Negative for cold intolerance, heat intolerance, polydipsia and polyuria.        Night sweats   Genitourinary:  Negative for dysuria, flank pain, frequency, hematuria and urgency.   Musculoskeletal:  Positive for arthralgias, back pain, gait problem (due to back pain) and myalgias. Negative for joint swelling and neck pain.   Skin:  Positive for rash (chronic, on feet). Negative for  wound.   Neurological:  Negative for dizziness, tremors, seizures, syncope, speech difficulty, weakness, numbness and headaches.   Hematological:  Negative for adenopathy. Bruises/bleeds easily.   Psychiatric/Behavioral:  Positive for dysphoric mood and sleep disturbance. Negative for confusion and suicidal ideas. The patient is nervous/anxious.        Objective   Vitals:    07/28/25 1403   BP: 130/84   Pulse: 76   SpO2: 96%     Body mass index is 23.54 kg/m².      07/28/25  1403   Weight: 78.7 kg (173 lb 9.6 oz)       Physical Exam  Vitals and nursing note reviewed.   Constitutional:       General: He is not in acute distress.     Appearance: He is well-groomed and normal weight. He is not ill-appearing.      Comments: Appears older than stated age   HENT:      Head: Atraumatic.      Mouth/Throat:      Mouth: Mucous membranes are moist.   Eyes:      General: No scleral icterus.     Conjunctiva/sclera: Conjunctivae normal.   Neck:      Thyroid: No thyroid mass.      Vascular: Normal carotid pulses. No carotid bruit.   Cardiovascular:      Rate and Rhythm: Normal rate and regular rhythm.      Pulses: Decreased pulses (pedal).      Heart sounds: Heart sounds are distant.   Pulmonary:      Effort: Pulmonary effort is normal.      Breath sounds: Decreased breath sounds present. No wheezing, rhonchi or rales.   Musculoskeletal:      Right lower leg: No edema.      Left lower leg: No edema.   Lymphadenopathy:      Cervical: No cervical adenopathy.   Skin:     General: Skin is warm and dry.      Coloration: Skin is not cyanotic, jaundiced or pale.      Nails: There is clubbing.   Neurological:      Mental Status: He is alert and oriented to person, place, and time.      Gait: Gait is intact.   Psychiatric:         Mood and Affect: Mood and affect normal.         Behavior: Behavior normal. Behavior is cooperative.         Cognition and Memory: Cognition normal.     Pt's previous physical exam reviewed and updated as  indicated.      Assessment & Plan   Diagnoses and all orders for this visit:    1. Erectile dysfunction, unspecified erectile dysfunction type (Primary)  -     CBC & Differential  -     sildenafil (REVATIO) 20 MG tablet; 2 TO 5 PO AS NEEDED 1 HR PRIOR TO INTERCOURSE  Dispense: 30 tablet; Refill: 1    2. RLS (restless legs syndrome)  -     rOPINIRole (REQUIP) 0.25 MG tablet; Take 1 hour before bedtime.  Dispense: 90 tablet; Refill: 0    3. Prediabetes  -     Hemoglobin A1c  -     Comprehensive Metabolic Panel    4. Leukocytosis, unspecified type  -     CBC & Differential    5. Essential hypertension  -     Comprehensive Metabolic Panel  -     CBC & Differential    6. Mixed hyperlipidemia    7. Gastroesophageal reflux disease without esophagitis    8. Tobacco use disorder    9. Chronic obstructive pulmonary disease, unspecified COPD type       I have reviewed risks/benefits and potential side effects of various treatment options for ED. Pt voices understanding and wishes to proceed with trial of sildenafil.    Tobacco cessation advised.  Pt voiced understanding of health risks of cont'd tobacco use.    Continue current medical management.  Follow-up with cardiology as scheduled.    Routine follow-up in 6 months or routine physical, follow-up sooner as needed/instructed.  I will contact patient regarding test results and provide instructions regarding any necessary changes in plan of care.  Patient was encouraged to keep me informed of any acute changes, lack of improvement, or any new concerning symptoms.  Pt is aware of reasons to seek emergent care.  Patient voiced understanding of all instructions and denied further questions.    Please note that portions of this note may have been completed with a voice recognition program.

## 2025-07-29 LAB
ALBUMIN SERPL-MCNC: 4.1 G/DL (ref 3.5–5.2)
ALBUMIN/GLOB SERPL: 2.2 G/DL
ALP SERPL-CCNC: 127 U/L (ref 39–117)
ALT SERPL-CCNC: 7 U/L (ref 1–41)
AST SERPL-CCNC: 11 U/L (ref 1–40)
BASOPHILS # BLD AUTO: 0.04 10*3/MM3 (ref 0–0.2)
BASOPHILS NFR BLD AUTO: 0.5 % (ref 0–1.5)
BILIRUB SERPL-MCNC: 1 MG/DL (ref 0–1.2)
BUN SERPL-MCNC: 18 MG/DL (ref 8–23)
BUN/CREAT SERPL: 21.4 (ref 7–25)
CALCIUM SERPL-MCNC: 8.6 MG/DL (ref 8.6–10.5)
CHLORIDE SERPL-SCNC: 101 MMOL/L (ref 98–107)
CO2 SERPL-SCNC: 27.5 MMOL/L (ref 22–29)
CREAT SERPL-MCNC: 0.84 MG/DL (ref 0.76–1.27)
EGFRCR SERPLBLD CKD-EPI 2021: 98.6 ML/MIN/1.73
EOSINOPHIL # BLD AUTO: 0.13 10*3/MM3 (ref 0–0.4)
EOSINOPHIL NFR BLD AUTO: 1.6 % (ref 0.3–6.2)
ERYTHROCYTE [DISTWIDTH] IN BLOOD BY AUTOMATED COUNT: 12.6 % (ref 12.3–15.4)
GLOBULIN SER CALC-MCNC: 1.9 GM/DL
GLUCOSE SERPL-MCNC: 96 MG/DL (ref 65–99)
HBA1C MFR BLD: 5.7 % (ref 4.8–5.6)
HCT VFR BLD AUTO: 43.6 % (ref 37.5–51)
HGB BLD-MCNC: 14.9 G/DL (ref 13–17.7)
IMM GRANULOCYTES # BLD AUTO: 0.03 10*3/MM3 (ref 0–0.05)
IMM GRANULOCYTES NFR BLD AUTO: 0.4 % (ref 0–0.5)
LYMPHOCYTES # BLD AUTO: 2.45 10*3/MM3 (ref 0.7–3.1)
LYMPHOCYTES NFR BLD AUTO: 31.1 % (ref 19.6–45.3)
MCH RBC QN AUTO: 30.3 PG (ref 26.6–33)
MCHC RBC AUTO-ENTMCNC: 34.2 G/DL (ref 31.5–35.7)
MCV RBC AUTO: 88.6 FL (ref 79–97)
MONOCYTES # BLD AUTO: 0.49 10*3/MM3 (ref 0.1–0.9)
MONOCYTES NFR BLD AUTO: 6.2 % (ref 5–12)
NEUTROPHILS # BLD AUTO: 4.75 10*3/MM3 (ref 1.7–7)
NEUTROPHILS NFR BLD AUTO: 60.2 % (ref 42.7–76)
NRBC BLD AUTO-RTO: 0 /100 WBC (ref 0–0.2)
PLATELET # BLD AUTO: 213 10*3/MM3 (ref 140–450)
POTASSIUM SERPL-SCNC: 3.8 MMOL/L (ref 3.5–5.2)
PROT SERPL-MCNC: 6 G/DL (ref 6–8.5)
RBC # BLD AUTO: 4.92 10*6/MM3 (ref 4.14–5.8)
SODIUM SERPL-SCNC: 139 MMOL/L (ref 136–145)
WBC # BLD AUTO: 7.89 10*3/MM3 (ref 3.4–10.8)

## 2025-07-31 DIAGNOSIS — I10 ESSENTIAL HYPERTENSION: ICD-10-CM

## 2025-07-31 RX ORDER — ROPINIROLE 0.25 MG/1
TABLET, FILM COATED ORAL
Qty: 90 TABLET | Refills: 0 | Status: SHIPPED | OUTPATIENT
Start: 2025-07-31

## 2025-07-31 RX ORDER — VALSARTAN 40 MG/1
40 TABLET ORAL DAILY
Qty: 30 TABLET | Refills: 3 | Status: SHIPPED | OUTPATIENT
Start: 2025-07-31

## 2025-08-12 RX ORDER — ASPIRIN 81 MG/1
81 TABLET, CHEWABLE ORAL DAILY
Qty: 30 TABLET | Refills: 2 | Status: SHIPPED | OUTPATIENT
Start: 2025-08-12

## 2025-08-12 RX ORDER — LANOLIN ALCOHOL/MO/W.PET/CERES
1000 CREAM (GRAM) TOPICAL DAILY
Qty: 30 TABLET | Refills: 2 | Status: SHIPPED | OUTPATIENT
Start: 2025-08-12

## 2025-08-28 RX ORDER — CARVEDILOL 12.5 MG/1
12.5 TABLET ORAL 2 TIMES DAILY WITH MEALS
Qty: 180 TABLET | Refills: 2 | Status: SHIPPED | OUTPATIENT
Start: 2025-08-28

## (undated) DEVICE — NDL HYPO ECLPS SFTY 25G 1 1/2IN

## (undated) DEVICE — PROXIMATE RH ROTATING HEAD SKIN STAPLERS (35 WIDE) CONTAINS 35 STAINLESS STEEL STAPLES: Brand: PROXIMATE

## (undated) DEVICE — NDL HYPO ECLPS SFTY 18G 1 1/2IN

## (undated) DEVICE — NEEDLE, QUINCKE 25GX3.5": Brand: MEDLINE

## (undated) DEVICE — DRP C/ARM W/BAND W/CLIPS 41X74IN

## (undated) DEVICE — SHEET,DRAPE,53X77,STERILE: Brand: MEDLINE

## (undated) DEVICE — NDL EPID PERIFIX TUOHY 18G 6IN

## (undated) DEVICE — GOWN,PREVENTION PLUS,XLONG/XLARGE,STRL: Brand: MEDLINE

## (undated) DEVICE — SUT VIC 2/0 CT1 27IN J259H

## (undated) DEVICE — Device: Brand: PORTEX

## (undated) DEVICE — SHEET,DRAPE,70X100,STERILE: Brand: MEDLINE

## (undated) DEVICE — PATIENT REMOTE 2500 KIT: Brand: OMNIA

## (undated) DEVICE — SKIN AFFIX SURG ADHESIVE 72/CS 0.55ML: Brand: MEDLINE

## (undated) DEVICE — SYR LUERLOK 5CC

## (undated) DEVICE — 3M™ STERI-STRIP™ REINFORCED ADHESIVE SKIN CLOSURES, R1547, 1/2 IN X 4 IN (12 MM X 100 MM), 6 STRIPS/ENVELOPE: Brand: 3M™ STERI-STRIP™

## (undated) DEVICE — SYR LUERLOK 50ML

## (undated) DEVICE — DRSNG WND GZ PAD BORDERED LF 4X5IN STRL

## (undated) DEVICE — RICH MAJOR PROCEDURE: Brand: MEDLINE INDUSTRIES, INC.

## (undated) DEVICE — DRSNG WND BORDR/ADHS NONADHR/GZ LF 4X4IN STRL

## (undated) DEVICE — DRSNG SURESITE WNDW 4X4.5

## (undated) DEVICE — NDL SPINE 22G 31/2IN BLK

## (undated) DEVICE — TOWEL,OR,DSP,ST,BLUE,STD,4/PK,20PK/CS: Brand: MEDLINE

## (undated) DEVICE — APPL CHLORAPREP HI/LITE TINTED 10.5ML ORNG

## (undated) DEVICE — SYR LL TP 10ML STRL

## (undated) DEVICE — SPNG GZ WOVN 4X4IN 12PLY 10/BX STRL

## (undated) DEVICE — TUNNELING TOOL KIT, 35CM: Brand: NEVRO®

## (undated) DEVICE — DRSNG TELFA PAD NONADH STR 1S 3X4IN

## (undated) DEVICE — CHARGER 2500 KIT: Brand: OMNIA

## (undated) DEVICE — 3M™ IOBAN™ 2 ANTIMICROBIAL INCISE DRAPE 6650EZ: Brand: IOBAN™ 2

## (undated) DEVICE — GLV SURG SENSICARE W/ALOE PF LF 8 STRL

## (undated) DEVICE — SYR LL 3CC

## (undated) DEVICE — BLD SCLPL SURG PREM SS SZ15C

## (undated) DEVICE — SUT SILK 2/0 SH 30IN K833H

## (undated) DEVICE — PK BASIC 70

## (undated) DEVICE — ANTIBACTERIAL UNDYED BRAIDED (POLYGLACTIN 910), SYNTHETIC ABSORBABLE SUTURE: Brand: COATED VICRYL

## (undated) DEVICE — CLAVICLE STRAP: Brand: DEROYAL

## (undated) DEVICE — INTENDED FOR TISSUE SEPARATION, AND OTHER PROCEDURES THAT REQUIRE A SHARP SURGICAL BLADE TO PUNCTURE OR CUT.: Brand: BARD-PARKER ® CARBON RIB-BACK BLADES

## (undated) DEVICE — ADHS LIQ MASTISOL 2/3ML